# Patient Record
Sex: FEMALE | Race: WHITE | Employment: FULL TIME | URBAN - METROPOLITAN AREA
[De-identification: names, ages, dates, MRNs, and addresses within clinical notes are randomized per-mention and may not be internally consistent; named-entity substitution may affect disease eponyms.]

---

## 2017-04-19 ENCOUNTER — TELEPHONE (OUTPATIENT)
Dept: INTERNAL MEDICINE CLINIC | Age: 52
End: 2017-04-19

## 2017-04-19 DIAGNOSIS — Z12.11 COLON CANCER SCREENING: ICD-10-CM

## 2017-04-19 LAB
CONTROL: NORMAL
HEMOCCULT STL QL: NEGATIVE

## 2017-04-19 PROCEDURE — 82274 ASSAY TEST FOR BLOOD FECAL: CPT | Performed by: FAMILY MEDICINE

## 2017-11-16 ENCOUNTER — HOSPITAL ENCOUNTER (OUTPATIENT)
Dept: MAMMOGRAPHY | Age: 52
Discharge: HOME | End: 2017-11-16
Attending: FAMILY MEDICINE | Admitting: FAMILY MEDICINE

## 2017-11-16 DIAGNOSIS — Z12.31 ENCOUNTER FOR SCREENING MAMMOGRAM FOR BREAST CANCER: ICD-10-CM

## 2018-01-04 ENCOUNTER — OFFICE VISIT (OUTPATIENT)
Dept: INTERNAL MEDICINE CLINIC | Age: 53
End: 2018-01-04

## 2018-01-04 VITALS
BODY MASS INDEX: 37.21 KG/M2 | WEIGHT: 210 LBS | HEART RATE: 72 BPM | HEIGHT: 63 IN | DIASTOLIC BLOOD PRESSURE: 82 MMHG | SYSTOLIC BLOOD PRESSURE: 118 MMHG

## 2018-01-04 DIAGNOSIS — Z00.00 ROUTINE PHYSICAL EXAMINATION: Primary | ICD-10-CM

## 2018-01-04 DIAGNOSIS — Z79.890 HORMONE REPLACEMENT THERAPY: ICD-10-CM

## 2018-01-04 DIAGNOSIS — E66.9 OBESITY (BMI 35.0-39.9 WITHOUT COMORBIDITY): ICD-10-CM

## 2018-01-04 DIAGNOSIS — E03.9 ACQUIRED HYPOTHYROIDISM: ICD-10-CM

## 2018-01-04 LAB
A/G RATIO: 1.7 (ref 1.1–2.2)
ALBUMIN SERPL-MCNC: 4.7 G/DL (ref 3.4–5)
ALP BLD-CCNC: 54 U/L (ref 40–129)
ALT SERPL-CCNC: 41 U/L (ref 10–40)
ANION GAP SERPL CALCULATED.3IONS-SCNC: 17 MMOL/L (ref 3–16)
AST SERPL-CCNC: 35 U/L (ref 15–37)
BASOPHILS ABSOLUTE: 0.1 K/UL (ref 0–0.2)
BASOPHILS RELATIVE PERCENT: 0.8 %
BILIRUB SERPL-MCNC: 0.3 MG/DL (ref 0–1)
BUN BLDV-MCNC: 9 MG/DL (ref 7–20)
CALCIUM SERPL-MCNC: 10 MG/DL (ref 8.3–10.6)
CHLORIDE BLD-SCNC: 97 MMOL/L (ref 99–110)
CO2: 28 MMOL/L (ref 21–32)
CREAT SERPL-MCNC: 0.6 MG/DL (ref 0.6–1.1)
EOSINOPHILS ABSOLUTE: 0.3 K/UL (ref 0–0.6)
EOSINOPHILS RELATIVE PERCENT: 3.3 %
GFR AFRICAN AMERICAN: >60
GFR NON-AFRICAN AMERICAN: >60
GLOBULIN: 2.7 G/DL
GLUCOSE BLD-MCNC: 75 MG/DL (ref 70–99)
HCT VFR BLD CALC: 45.7 % (ref 36–48)
HEMOGLOBIN: 15.2 G/DL (ref 12–16)
LYMPHOCYTES ABSOLUTE: 2.8 K/UL (ref 1–5.1)
LYMPHOCYTES RELATIVE PERCENT: 28.6 %
MCH RBC QN AUTO: 32.9 PG (ref 26–34)
MCHC RBC AUTO-ENTMCNC: 33.1 G/DL (ref 31–36)
MCV RBC AUTO: 99.3 FL (ref 80–100)
MONOCYTES ABSOLUTE: 0.7 K/UL (ref 0–1.3)
MONOCYTES RELATIVE PERCENT: 6.9 %
NEUTROPHILS ABSOLUTE: 5.9 K/UL (ref 1.7–7.7)
NEUTROPHILS RELATIVE PERCENT: 60.4 %
PDW BLD-RTO: 13.3 % (ref 12.4–15.4)
PLATELET # BLD: 303 K/UL (ref 135–450)
PMV BLD AUTO: 9.8 FL (ref 5–10.5)
POTASSIUM SERPL-SCNC: 4.3 MMOL/L (ref 3.5–5.1)
RBC # BLD: 4.61 M/UL (ref 4–5.2)
SODIUM BLD-SCNC: 142 MMOL/L (ref 136–145)
TOTAL PROTEIN: 7.4 G/DL (ref 6.4–8.2)
TSH REFLEX: 0.49 UIU/ML (ref 0.27–4.2)
WBC # BLD: 9.8 K/UL (ref 4–11)

## 2018-01-04 PROCEDURE — 99396 PREV VISIT EST AGE 40-64: CPT | Performed by: FAMILY MEDICINE

## 2018-01-04 RX ORDER — ESTRADIOL 2 MG/1
TABLET ORAL
Qty: 90 TABLET | Refills: 3 | Status: SHIPPED | OUTPATIENT
Start: 2018-01-04 | End: 2019-04-17 | Stop reason: SDUPTHER

## 2018-01-04 RX ORDER — LEVOTHYROXINE SODIUM 0.12 MG/1
TABLET ORAL
Qty: 90 TABLET | Refills: 3 | Status: SHIPPED | OUTPATIENT
Start: 2018-01-04 | End: 2018-01-26 | Stop reason: SDUPTHER

## 2018-01-04 ASSESSMENT — PATIENT HEALTH QUESTIONNAIRE - PHQ9
SUM OF ALL RESPONSES TO PHQ QUESTIONS 1-9: 0
SUM OF ALL RESPONSES TO PHQ9 QUESTIONS 1 & 2: 0
1. LITTLE INTEREST OR PLEASURE IN DOING THINGS: 0
2. FEELING DOWN, DEPRESSED OR HOPELESS: 0

## 2018-01-04 NOTE — PROGRESS NOTES
PREVENTATIVE VISIT AND EXAM      Here for annual physical.  Overall feeling fine/stable. She also needs her yearly thyroid check and refill on her hormone replacement. Exercise:  She is walking 15 minutes 3 times per week, plus trying to go up and down stairs more often. She does not use a pedometer or record her activity otherwise.     Patient Active Problem List   Diagnosis    Allergic rhinitis    Asthma    Hypothyroidism    Hormone replacement therapy       Past Medical History:   Diagnosis Date    Allergic rhinitis     Alec Berrios MD: mold, ragweed, feathers    Asthma     has cats in her home    Hypothyroidism     Pelvic relaxation     Urine, incontinence, stress female 7/27/2012    Vasomotor rhinitis      Past Surgical History:   Procedure Laterality Date    CHOLECYSTECTOMY  1998    ENDOMETRIAL ABLATION  2004    HYSTERECTOMY, VAGINAL      INCONTINENCE SURGERY  03/2014    Dr. Samuel Kendall  2004     Family History   Problem Relation Age of Onset    Other Son      probable mild anxiety --- more from his paternal side    Heart Disease Mother     Hypertension Mother    Joanna Mccoy Other Mother      GERD/IBS    Osteoarthritis Mother     Stroke Mother      due to uncontrolled HTN    Memory Loss Mother      may have been HTN-cinthia encephalopathy    Hypertension Father      Social History   Substance Use Topics    Smoking status: Former Smoker     Packs/day: 1.00     Years: 10.00     Types: Cigarettes     Start date: 1/1/1988     Quit date: 1/1/1998    Smokeless tobacco: Never Used    Alcohol use 0.0 - 1.0 oz/week      Comment: Occassional to rare:  0-2 per month       Outpatient Prescriptions Marked as Taking for the 1/4/18 encounter (Office Visit) with Jeannie Alejandra MD   Medication Sig Dispense Refill    estradiol (ESTRACE) 2 MG tablet Take 1 tablet by mouth  daily 90 tablet 3    levothyroxine (SYNTHROID) 125 MCG tablet TAKE 1 TABLET DAILY 90 tablet 3    Triamcinolone Acetonide (NASACORT ALLERGY 24HR) 55 MCG/ACT AERO 1 spray by Nasal route daily. Use in each nostril 1 Bottle 11         ROS:  Full 12 system review done and all negative except for the following:   -Nasal and sinus congestion. She has allergies and vasomotor rhinitis. -Frequent heartburn. A couple episodes over the year, estimated to be 3-4 times where she has some upper chest heaviness that radiates to the right side. It occurs when she is sitting and resting. She is not emotionally upset nor exercising/exerting herself. It lasts only for about 2 minutes. Physical Exam   Vitals:    01/04/18 1302   BP: 118/82   Pulse: 72     Body mass index is 37.2 kg/m². Wt Readings from Last 3 Encounters:   01/04/18 210 lb (95.3 kg)   12/22/16 203 lb (92.1 kg)   12/17/15 214 lb 6.4 oz (97.3 kg)         Constitutional: Oriented to person, place, and time. Appears well-developed and well-nourished. No distress. HENT:   Head: Normocephalic and atraumatic. Ears: Hearing, tympanic membrane, external ear and ear canal normal.   Nose: Nose normal.   Mouth/Throat: Uvula is midline, oropharynx is clear and moist and mucous membranes are normal.   Eyes: Conjunctivae and EOM are normal. Pupils are equal, round, and reactive to light. No scleral icterus. Neck: Normal range of motion. Neck supple. Normal carotid pulses and no JVD present. Carotid bruit is not present. No tracheal deviation present. No mass and no thyromegaly present. Cardiovascular: Normal rate, regular rhythm, S1 normal, S2 normal, normal heart sounds and intact distal pulses. No murmur heard. Pulmonary/Chest: Effort normal and breath sounds normal. No stridor. No respiratory distress. No decreased breath sounds. No wheezes. No rhonchi. No rales. Abdominal: Soft. Normal appearance and bowel sounds are normal. No distension, no abdominal bruit and no mass. There is no hepatomegaly. No tenderness. Musculoskeletal: Normal range of motion. No edema.

## 2018-01-04 NOTE — PATIENT INSTRUCTIONS
of exercise over 3 or more day of the week. Preventative services for women:  *  Pap smear yearly starting at age 24 (or approximately 3 years from having first intercourse if younger than age 25) until determined to be low risk status and then at least every 3 years. If a hysterectomy is done for non-cancer reasons, a Pap smear is no longer required. If you have been regular with pap smears up until the age of 72, you may be able to stop routine screening  *  Osteoporosis testing for women 65 years and older. Younger women at increased risk. *  Mammogram every one to two years starting at the age of 36 versus 39. Women with a specific family history may need to start screening at a younger age. A doctor's order is NOT required unless it is not a routine yearly mammogram, so may call hospital or facility and schedule yearly. [id number]    Body for Life by [employer] and/or web site [email or web address]. Excellent program for the weight lifting/weight training and also cardio exercise. Make sure not to lift weights with the same muscle group for at least 72 hours, so the muscles can repair and grow, increasing your metabolism. Use the weight lifting grids and spacing. Week 1:  Upper body, skip a day, lower body, skip a day, upper body. Week 2:  Lower body, skip a day, upper body, skip a day, lower body    2 lb, 3 lb, 5 lb and 8 lb weights.   (no weight for the first rep is OK to star also)

## 2018-11-24 ENCOUNTER — HOSPITAL ENCOUNTER (OUTPATIENT)
Dept: WOMENS IMAGING | Age: 53
Discharge: HOME | End: 2018-11-24
Payer: COMMERCIAL

## 2018-11-24 DIAGNOSIS — Z12.39 BREAST CANCER SCREENING: ICD-10-CM

## 2018-11-24 PROCEDURE — 77067 SCR MAMMO BI INCL CAD: CPT

## 2019-01-10 ENCOUNTER — OFFICE VISIT (OUTPATIENT)
Dept: INTERNAL MEDICINE CLINIC | Age: 54
End: 2019-01-10
Payer: COMMERCIAL

## 2019-01-10 VITALS
WEIGHT: 216.6 LBS | DIASTOLIC BLOOD PRESSURE: 76 MMHG | BODY MASS INDEX: 38.38 KG/M2 | HEART RATE: 72 BPM | SYSTOLIC BLOOD PRESSURE: 118 MMHG | HEIGHT: 63 IN

## 2019-01-10 DIAGNOSIS — E03.9 ACQUIRED HYPOTHYROIDISM: ICD-10-CM

## 2019-01-10 DIAGNOSIS — E66.9 OBESITY (BMI 35.0-39.9 WITHOUT COMORBIDITY): ICD-10-CM

## 2019-01-10 DIAGNOSIS — Z00.00 ANNUAL PHYSICAL EXAM: Primary | ICD-10-CM

## 2019-01-10 DIAGNOSIS — Z12.11 COLON CANCER SCREENING: ICD-10-CM

## 2019-01-10 DIAGNOSIS — E78.9 BORDERLINE HIGH CHOLESTEROL: ICD-10-CM

## 2019-01-10 PROCEDURE — 99396 PREV VISIT EST AGE 40-64: CPT | Performed by: FAMILY MEDICINE

## 2019-01-10 RX ORDER — LEVOTHYROXINE SODIUM 0.12 MG/1
TABLET ORAL
Qty: 90 TABLET | Refills: 3 | Status: SHIPPED | OUTPATIENT
Start: 2019-01-10 | End: 2020-01-26

## 2019-01-10 ASSESSMENT — PATIENT HEALTH QUESTIONNAIRE - PHQ9
SUM OF ALL RESPONSES TO PHQ QUESTIONS 1-9: 0
1. LITTLE INTEREST OR PLEASURE IN DOING THINGS: 0
SUM OF ALL RESPONSES TO PHQ QUESTIONS 1-9: 0
SUM OF ALL RESPONSES TO PHQ9 QUESTIONS 1 & 2: 0
2. FEELING DOWN, DEPRESSED OR HOPELESS: 0

## 2019-01-12 ENCOUNTER — HOSPITAL ENCOUNTER (OUTPATIENT)
Age: 54
Discharge: HOME | End: 2019-01-12
Payer: COMMERCIAL

## 2019-01-12 DIAGNOSIS — E78.9 BORDERLINE HIGH CHOLESTEROL: ICD-10-CM

## 2019-01-12 DIAGNOSIS — Z00.00 ANNUAL PHYSICAL EXAM: ICD-10-CM

## 2019-01-12 DIAGNOSIS — E03.9 ACQUIRED HYPOTHYROIDISM: ICD-10-CM

## 2019-01-12 DIAGNOSIS — E66.9 OBESITY (BMI 35.0-39.9 WITHOUT COMORBIDITY): ICD-10-CM

## 2019-01-12 LAB
A/G RATIO: 1.4 (ref 1.1–2.2)
ALBUMIN SERPL-MCNC: 4.3 G/DL (ref 3.4–5)
ALP BLD-CCNC: 56 U/L (ref 40–129)
ALT SERPL-CCNC: 34 U/L (ref 10–40)
ANION GAP SERPL CALCULATED.3IONS-SCNC: 12 MMOL/L (ref 3–16)
AST SERPL-CCNC: 26 U/L (ref 15–37)
BASOPHILS ABSOLUTE: 0 K/UL (ref 0–0.2)
BASOPHILS RELATIVE PERCENT: 0.3 %
BILIRUB SERPL-MCNC: 0.4 MG/DL (ref 0–1)
BUN BLDV-MCNC: 8 MG/DL (ref 7–20)
CALCIUM SERPL-MCNC: 9.7 MG/DL (ref 8.3–10.6)
CHLORIDE BLD-SCNC: 104 MMOL/L (ref 99–110)
CHOLESTEROL, TOTAL: 165 MG/DL (ref 0–199)
CO2: 28 MMOL/L (ref 21–32)
CREAT SERPL-MCNC: 0.7 MG/DL (ref 0.6–1.1)
EOSINOPHILS ABSOLUTE: 0.4 K/UL (ref 0–0.6)
EOSINOPHILS RELATIVE PERCENT: 4.6 %
GFR AFRICAN AMERICAN: >60
GFR NON-AFRICAN AMERICAN: >60
GLOBULIN: 3.1 G/DL
GLUCOSE BLD-MCNC: 111 MG/DL (ref 70–99)
HCT VFR BLD CALC: 45 % (ref 36–48)
HDLC SERPL-MCNC: 45 MG/DL (ref 40–60)
HEMOGLOBIN: 14.9 G/DL (ref 12–16)
LDL CHOLESTEROL CALCULATED: 72 MG/DL
LYMPHOCYTES ABSOLUTE: 1.8 K/UL (ref 1–5.1)
LYMPHOCYTES RELATIVE PERCENT: 23.3 %
MCH RBC QN AUTO: 32.9 PG (ref 26–34)
MCHC RBC AUTO-ENTMCNC: 33.2 G/DL (ref 31–36)
MCV RBC AUTO: 99 FL (ref 80–100)
MONOCYTES ABSOLUTE: 0.7 K/UL (ref 0–1.3)
MONOCYTES RELATIVE PERCENT: 8.9 %
NEUTROPHILS ABSOLUTE: 5 K/UL (ref 1.7–7.7)
NEUTROPHILS RELATIVE PERCENT: 62.9 %
PDW BLD-RTO: 13.4 % (ref 12.4–15.4)
PLATELET # BLD: 308 K/UL (ref 135–450)
PMV BLD AUTO: 11.8 FL (ref 5–10.5)
POTASSIUM SERPL-SCNC: 4.7 MMOL/L (ref 3.5–5.1)
RBC # BLD: 4.55 M/UL (ref 4–5.2)
SODIUM BLD-SCNC: 144 MMOL/L (ref 136–145)
TOTAL PROTEIN: 7.4 G/DL (ref 6.4–8.2)
TRIGL SERPL-MCNC: 238 MG/DL (ref 0–150)
TSH REFLEX: 0.3 UIU/ML (ref 0.27–4.2)
VLDLC SERPL CALC-MCNC: 48 MG/DL
WBC # BLD: 7.9 K/UL (ref 4–11)

## 2019-01-12 PROCEDURE — 80053 COMPREHEN METABOLIC PANEL: CPT

## 2019-01-12 PROCEDURE — 85025 COMPLETE CBC W/AUTO DIFF WBC: CPT

## 2019-01-12 PROCEDURE — 84443 ASSAY THYROID STIM HORMONE: CPT

## 2019-01-12 PROCEDURE — 36415 COLL VENOUS BLD VENIPUNCTURE: CPT

## 2019-01-12 PROCEDURE — 80061 LIPID PANEL: CPT

## 2019-01-26 DIAGNOSIS — E03.9 ACQUIRED HYPOTHYROIDISM: ICD-10-CM

## 2019-01-28 RX ORDER — LEVOTHYROXINE SODIUM 0.12 MG/1
TABLET ORAL
Qty: 30 TABLET | Refills: 0 | Status: SHIPPED | OUTPATIENT
Start: 2019-01-28 | End: 2020-01-23 | Stop reason: SDUPTHER

## 2019-10-27 DIAGNOSIS — Z79.890 HORMONE REPLACEMENT THERAPY: ICD-10-CM

## 2019-10-28 RX ORDER — ESTRADIOL 2 MG/1
TABLET ORAL
Qty: 90 TABLET | Refills: 0 | Status: SHIPPED | OUTPATIENT
Start: 2019-10-28 | End: 2020-01-23 | Stop reason: SDUPTHER

## 2019-12-07 ENCOUNTER — HOSPITAL ENCOUNTER (OUTPATIENT)
Dept: WOMENS IMAGING | Age: 54
Discharge: HOME | End: 2019-12-07
Payer: COMMERCIAL

## 2019-12-07 DIAGNOSIS — Z12.31 BREAST CANCER SCREENING BY MAMMOGRAM: ICD-10-CM

## 2019-12-07 PROCEDURE — 77067 SCR MAMMO BI INCL CAD: CPT

## 2020-01-23 ENCOUNTER — OFFICE VISIT (OUTPATIENT)
Dept: INTERNAL MEDICINE CLINIC | Age: 55
End: 2020-01-23
Payer: COMMERCIAL

## 2020-01-23 VITALS
HEIGHT: 63 IN | DIASTOLIC BLOOD PRESSURE: 78 MMHG | SYSTOLIC BLOOD PRESSURE: 118 MMHG | WEIGHT: 215 LBS | BODY MASS INDEX: 38.09 KG/M2 | HEART RATE: 80 BPM

## 2020-01-23 LAB
A/G RATIO: 1.5 (ref 1.1–2.2)
ALBUMIN SERPL-MCNC: 4.3 G/DL (ref 3.4–5)
ALP BLD-CCNC: 55 U/L (ref 40–129)
ALT SERPL-CCNC: 35 U/L (ref 10–40)
ANION GAP SERPL CALCULATED.3IONS-SCNC: 14 MMOL/L (ref 3–16)
AST SERPL-CCNC: 33 U/L (ref 15–37)
BASOPHILS ABSOLUTE: 0.1 K/UL (ref 0–0.2)
BASOPHILS RELATIVE PERCENT: 1 %
BILIRUB SERPL-MCNC: 0.3 MG/DL (ref 0–1)
BUN BLDV-MCNC: 6 MG/DL (ref 7–20)
CALCIUM SERPL-MCNC: 9.8 MG/DL (ref 8.3–10.6)
CHLORIDE BLD-SCNC: 101 MMOL/L (ref 99–110)
CHOLESTEROL, TOTAL: 170 MG/DL (ref 0–199)
CO2: 28 MMOL/L (ref 21–32)
CREAT SERPL-MCNC: 0.6 MG/DL (ref 0.6–1.1)
EOSINOPHILS ABSOLUTE: 0.2 K/UL (ref 0–0.6)
EOSINOPHILS RELATIVE PERCENT: 2.3 %
GFR AFRICAN AMERICAN: >60
GFR NON-AFRICAN AMERICAN: >60
GLOBULIN: 2.8 G/DL
GLUCOSE BLD-MCNC: 106 MG/DL (ref 70–99)
HCT VFR BLD CALC: 44.6 % (ref 36–48)
HDLC SERPL-MCNC: 49 MG/DL (ref 40–60)
HEMOGLOBIN: 15.1 G/DL (ref 12–16)
LDL CHOLESTEROL CALCULATED: 77 MG/DL
LYMPHOCYTES ABSOLUTE: 2 K/UL (ref 1–5.1)
LYMPHOCYTES RELATIVE PERCENT: 24.4 %
MCH RBC QN AUTO: 33.1 PG (ref 26–34)
MCHC RBC AUTO-ENTMCNC: 33.8 G/DL (ref 31–36)
MCV RBC AUTO: 97.8 FL (ref 80–100)
MONOCYTES ABSOLUTE: 0.6 K/UL (ref 0–1.3)
MONOCYTES RELATIVE PERCENT: 6.8 %
NEUTROPHILS ABSOLUTE: 5.4 K/UL (ref 1.7–7.7)
NEUTROPHILS RELATIVE PERCENT: 65.5 %
PDW BLD-RTO: 13.6 % (ref 12.4–15.4)
PLATELET # BLD: 263 K/UL (ref 135–450)
PMV BLD AUTO: 11 FL (ref 5–10.5)
POTASSIUM SERPL-SCNC: 4.3 MMOL/L (ref 3.5–5.1)
RBC # BLD: 4.56 M/UL (ref 4–5.2)
SODIUM BLD-SCNC: 143 MMOL/L (ref 136–145)
T3 TOTAL: 1.59 NG/ML (ref 0.8–2)
T4 FREE: 1.5 NG/DL (ref 0.9–1.8)
TOTAL PROTEIN: 7.1 G/DL (ref 6.4–8.2)
TRIGL SERPL-MCNC: 219 MG/DL (ref 0–150)
TSH REFLEX: 0.02 UIU/ML (ref 0.27–4.2)
VLDLC SERPL CALC-MCNC: 44 MG/DL
WBC # BLD: 8.2 K/UL (ref 4–11)

## 2020-01-23 PROCEDURE — 90732 PPSV23 VACC 2 YRS+ SUBQ/IM: CPT | Performed by: FAMILY MEDICINE

## 2020-01-23 PROCEDURE — 99396 PREV VISIT EST AGE 40-64: CPT | Performed by: FAMILY MEDICINE

## 2020-01-23 PROCEDURE — 90471 IMMUNIZATION ADMIN: CPT | Performed by: FAMILY MEDICINE

## 2020-01-23 RX ORDER — FAMOTIDINE 20 MG/1
20 TABLET, FILM COATED ORAL 2 TIMES DAILY PRN
COMMUNITY

## 2020-01-23 RX ORDER — ESTRADIOL 1 MG/1
TABLET ORAL
Qty: 135 TABLET | Refills: 3 | Status: SHIPPED | OUTPATIENT
Start: 2020-01-23 | End: 2021-02-02 | Stop reason: SDUPTHER

## 2020-01-23 SDOH — ECONOMIC STABILITY: TRANSPORTATION INSECURITY
IN THE PAST 12 MONTHS, HAS LACK OF TRANSPORTATION KEPT YOU FROM MEETINGS, WORK, OR FROM GETTING THINGS NEEDED FOR DAILY LIVING?: NO

## 2020-01-23 SDOH — ECONOMIC STABILITY: FOOD INSECURITY: WITHIN THE PAST 12 MONTHS, YOU WORRIED THAT YOUR FOOD WOULD RUN OUT BEFORE YOU GOT MONEY TO BUY MORE.: NEVER TRUE

## 2020-01-23 SDOH — ECONOMIC STABILITY: FOOD INSECURITY: WITHIN THE PAST 12 MONTHS, THE FOOD YOU BOUGHT JUST DIDN'T LAST AND YOU DIDN'T HAVE MONEY TO GET MORE.: NEVER TRUE

## 2020-01-23 SDOH — HEALTH STABILITY: MENTAL HEALTH: HOW MANY STANDARD DRINKS CONTAINING ALCOHOL DO YOU HAVE ON A TYPICAL DAY?: 1 OR 2

## 2020-01-23 SDOH — ECONOMIC STABILITY: TRANSPORTATION INSECURITY
IN THE PAST 12 MONTHS, HAS THE LACK OF TRANSPORTATION KEPT YOU FROM MEDICAL APPOINTMENTS OR FROM GETTING MEDICATIONS?: NO

## 2020-01-23 SDOH — ECONOMIC STABILITY: INCOME INSECURITY: HOW HARD IS IT FOR YOU TO PAY FOR THE VERY BASICS LIKE FOOD, HOUSING, MEDICAL CARE, AND HEATING?: NOT HARD AT ALL

## 2020-01-23 SDOH — HEALTH STABILITY: PHYSICAL HEALTH: ON AVERAGE, HOW MANY MINUTES DO YOU ENGAGE IN EXERCISE AT THIS LEVEL?: 30 MIN

## 2020-01-23 SDOH — HEALTH STABILITY: PHYSICAL HEALTH: ON AVERAGE, HOW MANY DAYS PER WEEK DO YOU ENGAGE IN MODERATE TO STRENUOUS EXERCISE (LIKE A BRISK WALK)?: 3 DAYS

## 2020-01-23 SDOH — HEALTH STABILITY: MENTAL HEALTH: HOW OFTEN DO YOU HAVE A DRINK CONTAINING ALCOHOL?: 2-4 TIMES A MONTH

## 2020-01-23 NOTE — PROGRESS NOTES
Chief Complaint   Patient presents with    Annual Exam     Fasting today. PREVENTATIVE VISIT AND EXAM      Exercise:  Walking rapidly on the treadmill. She tries to do at least a mile each time in 30 min. Sometimes goes back for another 15 min later in day. Having GERD symptoms every other day. Was taking Zantac but now taking Pepcid AC and it seems to be OK.     Patient Active Problem List   Diagnosis    Allergic rhinitis    Asthma    Hypothyroidism    Hormone replacement therapy    Obesity (BMI 35.0-39.9 without comorbidity)       Past Medical History:   Diagnosis Date    Allergic rhinitis     Stephen Vega MD: mold, ragweed, feathers    Asthma     has cats in her home    Hypothyroidism     Pelvic relaxation     Urine, incontinence, stress female 2012    Vasomotor rhinitis      Past Surgical History:   Procedure Laterality Date    CHOLECYSTECTOMY      ENDOMETRIAL ABLATION      HYSTERECTOMY, VAGINAL      INCONTINENCE SURGERY  2014    Dr. Arianna Singh       Family History   Problem Relation Age of Onset    Other Son         probable mild anxiety --- more from his paternal side    Heart Disease Mother     Hypertension Mother    [facility] Other Mother         GERD/IBS    Osteoarthritis Mother     Stroke Mother         due to uncontrolled HTN    Memory Loss Mother         may have been HTN-cinthia encephalopathy    Hypertension Father      Social History     Tobacco Use    Smoking status: Former Smoker     Packs/day: 1.00     Years: 10.00     Pack years: 10.00     Types: Cigarettes     Start date: 1988     Last attempt to quit: 1998     Years since quittin.0    Smokeless tobacco: Never Used   Substance Use Topics    Alcohol use: No     Alcohol/week: 0.0 - 1.7 standard drinks     Comment: Occassional to rare:  0-2 per month    Drug use: No       Outpatient Medications Marked as Taking for the 20 encounter (Office Visit) with Juan Vázquez MD   Medication Sig Dispense Refill    estradiol (ESTRACE) 2 MG tablet TAKE 1 TABLET BY MOUTH DAILY 90 tablet 0    levothyroxine (SYNTHROID) 125 MCG tablet TAKE 1 TABLET BY MOUTH DAILY 90 tablet 3         ROS:  Full 12 system review done and all negative except for the following:   Nasal congestion,  Some SOB --- probably asthma, frequent heartburn    Last time she went to allergist her lung function test did decline some. This was before she started doing the treadmill. Had to take a couple months off treadmill due to foot pain. It is feeling better now. Physical Exam   Vitals:    01/23/20 0940   BP: 118/78   Pulse: 80      Body mass index is 38.09 kg/m². Wt Readings from Last 3 Encounters:   01/23/20 215 lb (97.5 kg)   01/10/19 216 lb 9.6 oz (98.2 kg)   01/04/18 210 lb (95.3 kg)         Constitutional: Oriented to person, place, and time. Appears well-developed and well-nourished. No distress. HENT:   Head: Normocephalic and atraumatic. Ears: Hearing, tympanic membrane, external ear and ear canal normal.   Nose: Nose normal.   Mouth/Throat: Uvula is midline, oropharynx is clear and moist and mucous membranes are normal.   Eyes: Conjunctivae and EOM are normal. Pupils are equal, round, and reactive to light. No scleral icterus. Neck: Normal range of motion. Neck supple. Normal carotid pulses and no JVD present. Carotid bruit is not present. No tracheal deviation present. No mass and no thyromegaly present. Cardiovascular: Normal rate, regular rhythm, S1 normal, S2 normal, normal heart sounds and intact distal pulses. No murmur heard. Pulmonary/Chest: Effort normal and breath sounds normal. No stridor. No respiratory distress. No decreased breath sounds. No wheezes. No rhonchi. No rales. Abdominal: Soft. Normal appearance and bowel sounds are normal. No distension, no abdominal bruit and no mass. There is no hepatomegaly. No tenderness. Musculoskeletal: Normal range of motion. No edema. Lymphadenopathy:     No cervical adenopathy. No axillary adenopathy. No supraclavicular adenopathy. No Inguinal adenopathy. Neurological: Alert and oriented to person, place, and time. Normal reflexes. No tremor. No cranial nerve deficit. She exhibits normal muscle tone. Coordination and gait normal.   Skin: Skin is warm and dry. No rash noted. Not diaphoretic. No cyanosis. No pallor. Nails show no clubbing. Psychiatric:  Normal mood and affect. Speech is normal and behavior is normal. Cognition and memory are normal.       The 10-year ASCVD risk score (Jade Rodgers et al., 2013) is: 1.4%    Values used to calculate the score:      Age: 49 years      Sex: Female      Is Non- : No      Diabetic: No      Tobacco smoker: No      Systolic Blood Pressure: 667 mmHg      Is BP treated: No      HDL Cholesterol: 45 mg/dL      Total Cholesterol: 165 mg/dL      Assessment:      1. Annual physical exam  Update HM issues. - CBC Auto Differential  - Comprehensive Metabolic Panel  - Lipid Panel  - TSH with Reflex  - Hemoglobin A1C    2. Hormone replacement therapy  Start weaning dose or ERT due to age. - estradiol (ESTRACE) 1 MG tablet; Take 2 pills on even days of the month and 1 pill on odd days of the month. Dispense: 135 tablet; Refill: 3    3. Acquired hypothyroidism  Euthyroid on current LT4 replacement.    - TSH with Reflex    4. Obesity (BMI 35.0-39.9 without comorbidity)  dwp diet and weight loss. See patient instructions. 5. Mild intermittent asthma without complication  See allergist.  Sounds clear today. Obesity can also cause decline in peak flow. 6. Hyperglycemia  - Comprehensive Metabolic Panel  - Hemoglobin A1C    7. Need for pneumococcal vaccination  Given PPV23 today.      8. Heartburn  On prn H2 blocker and doing OK  - CBC Auto Differential  - Comprehensive Metabolic Panel        Health Maintenance Due   Topic Date Due    Pneumococcal 0-64 years Vaccine (1 of 1 - PPSV23) 11/14/1972    Diabetes screen  11/14/2006    Shingles Vaccine (1 of 2) 11/14/2016    Flu vaccine (1) 09/01/2019    TSH testing  01/12/2020         Plan:    See orders and patient instructions. Age-appropriate preventative issues discussed and hand out given. An electronic signature was used to authenticate this note.     --Martin Diggs MD on 9:57 AM , 1/23/2020

## 2020-01-23 NOTE — PATIENT INSTRUCTIONS
The new estrogen pills are half the strength, so wean down gradually. If you have too many symptoms with taking one tab alternating with 2 tabs every day, then go slower. I may need to get you more pills. When you feel comfortable, you can cut down to 1 mg daily. SHINGLES VACCINE = SHINGRIX  1 out of 3 people will get shingles in their lifetime. If you had shingles already once in your life you have a 5% chance of getting it a second time = 5/100 chance of getting it again    The new Shingles vaccine = Shingrix is 95% effective at preventing shingles. It is a killed virus vaccine so you cannot get shingles from the vaccine or spread it to others. The old vaccine Zostavax was 50% effective and a weak live virus with more risk. The vaccine may not be covered by your insurance, so check with them or have the pharmacy run your insurance if they have it in stock and can give it. Patient Education        Learning About the Mediterranean Diet  What is the [address]? The Mediterranean diet is a style of eating rather than a diet plan. It features foods eaten in [place], [place], [place] and [place], and other countries along the [facility]. It emphasizes eating foods like fish, fruits, vegetables, beans, high-fiber breads and whole grains, nuts, and olive oil. This style of eating includes limited red meat, cheese, and sweets. Why choose the Mediterranean diet? A Mediterranean-style diet may improve heart health. It contains more fat than other heart-healthy diets. But the fats are mainly from nuts, unsaturated oils (such as fish oils and olive oil), and certain nut or seed oils (such as canola, soybean, or flaxseed oil). These fats may help protect the heart and blood vessels. How can you get started on the Mediterranean diet? Here are some things you can do to switch to a more Mediterranean way of eating.   What to eat  · Eat a variety of fruits and vegetables each day, such olive oil instead of butter. · Order dishes made with marinara sauce or sauces made from olive oil. Avoid sauces made from cream or mayonnaise. · Choose whole-grain breads, whole wheat pasta and pizza crust, brown rice, beans, and lentils. · Cut back on butter or margarine on bread. Instead, you can dip your bread in a small amount of olive oil. · Ask for a side salad or grilled vegetables instead of french fries or chips. Where can you learn more? Go to https://[employer] Social[employer]iceweb.[email or web address]. org and sign in to your [employer] account. Enter [phone] in the [facility] box to learn more about \"Learning About the Mediterranean Diet. \"     If you do not have an account, please click on the \"Sign Up Now\" link. Current as of: August 21, 2019  Content Version: 12.3  © 3912-6280 Healthwise, [employer]. Care instructions adapted under license by [facility] ([facility]). If you have questions about a medical condition or this instruction, always ask your healthcare professional. [place] [address] any warranty or liability for your use of this information.

## 2020-01-24 LAB
ESTIMATED AVERAGE GLUCOSE: 114 MG/DL
HBA1C MFR BLD: 5.6 %

## 2020-01-26 RX ORDER — LEVOTHYROXINE SODIUM 0.12 MG/1
TABLET ORAL
Qty: 90 TABLET | Refills: 3
Start: 2020-01-26 | End: 2020-02-17

## 2020-02-17 RX ORDER — LEVOTHYROXINE SODIUM 0.12 MG/1
TABLET ORAL
Qty: 90 TABLET | Refills: 3 | Status: SHIPPED | OUTPATIENT
Start: 2020-02-17 | End: 2020-09-13 | Stop reason: DRUGHIGH

## 2020-09-08 ENCOUNTER — NURSE TRIAGE (OUTPATIENT)
Dept: OTHER | Facility: CLINIC | Age: 55
End: 2020-09-08

## 2020-09-08 NOTE — TELEPHONE ENCOUNTER
Reason for Disposition   Numbness or tingling in one or both feet is a chronic symptom (recurrent or ongoing problem lasting > 4 weeks)    Answer Assessment - Initial Assessment Questions  1. SYMPTOM: \"What is the main symptom you are concerned about? \" (e.g., weakness, numbness)      Tingling   2. ONSET: \"When did this start? \" (minutes, hours, days; while sleeping)      8 days ago  3. LAST NORMAL: \"When was the last time you were normal (no symptoms)? \"      9 days ago  4. PATTERN \"Does this come and go, or has it been constant since it started? \"  \"Is it present now? \"      Constant   5. CARDIAC SYMPTOMS: \"Have you had any of the following symptoms: chest pain, difficulty breathing, palpitations? \"      none  6. NEUROLOGIC SYMPTOMS: \"Have you had any of the following symptoms: headache, dizziness, vision loss, double vision, changes in speech, unsteady on your feet? \"      3 weeks ago, dizziness/vertigo   7. OTHER SYMPTOMS: \"Do you have any other symptoms? \"      none  8. PREGNANCY: \"Is there any chance you are pregnant? \" \"When was your last menstrual period? \"      no    Protocols used: NEUROLOGIC DEFICIT-ADULT-[place]    Patient called pre-service center [facility] with red flag complaint. Brief description of triage: pt complaint of new right foot tingling for the last 2 weeks. Reports dizzy spell 3 weeks ago. Would like to be been & assessed by PCP    Triage indicates for patient to to be seen in 3 days    Care advice provided, patient verbalizes understanding; denies any other questions or concerns; instructed to call back for any new or worsening symptoms. Writer provided warm transfer to [facility] for appointment scheduling. Please do not respond to the triage nurse through this encounter. Any subsequent communication should be directly with the patient.

## 2020-09-10 ENCOUNTER — OFFICE VISIT (OUTPATIENT)
Dept: INTERNAL MEDICINE CLINIC | Age: 55
End: 2020-09-10
Payer: COMMERCIAL

## 2020-09-10 VITALS
HEIGHT: 63 IN | TEMPERATURE: 99 F | BODY MASS INDEX: 37.74 KG/M2 | HEART RATE: 100 BPM | DIASTOLIC BLOOD PRESSURE: 84 MMHG | SYSTOLIC BLOOD PRESSURE: 126 MMHG | WEIGHT: 213 LBS

## 2020-09-10 DIAGNOSIS — R20.0 NUMBNESS AND TINGLING OF BOTH FEET: ICD-10-CM

## 2020-09-10 DIAGNOSIS — E03.9 ACQUIRED HYPOTHYROIDISM: ICD-10-CM

## 2020-09-10 DIAGNOSIS — R20.2 NUMBNESS AND TINGLING OF BOTH FEET: ICD-10-CM

## 2020-09-10 DIAGNOSIS — R73.9 HYPERGLYCEMIA: ICD-10-CM

## 2020-09-10 LAB
BASOPHILS ABSOLUTE: 0.1 K/UL (ref 0–0.2)
BASOPHILS RELATIVE PERCENT: 1.1 %
EOSINOPHILS ABSOLUTE: 0.2 K/UL (ref 0–0.6)
EOSINOPHILS RELATIVE PERCENT: 2.2 %
HCT VFR BLD CALC: 43.6 % (ref 36–48)
HEMOGLOBIN: 14.7 G/DL (ref 12–16)
LYMPHOCYTES ABSOLUTE: 2.8 K/UL (ref 1–5.1)
LYMPHOCYTES RELATIVE PERCENT: 29.5 %
MCH RBC QN AUTO: 33 PG (ref 26–34)
MCHC RBC AUTO-ENTMCNC: 33.7 G/DL (ref 31–36)
MCV RBC AUTO: 98 FL (ref 80–100)
MONOCYTES ABSOLUTE: 0.7 K/UL (ref 0–1.3)
MONOCYTES RELATIVE PERCENT: 7.5 %
NEUTROPHILS ABSOLUTE: 5.6 K/UL (ref 1.7–7.7)
NEUTROPHILS RELATIVE PERCENT: 59.7 %
PDW BLD-RTO: 13.3 % (ref 12.4–15.4)
PLATELET # BLD: 260 K/UL (ref 135–450)
PMV BLD AUTO: 10.9 FL (ref 5–10.5)
RBC # BLD: 4.45 M/UL (ref 4–5.2)
WBC # BLD: 9.4 K/UL (ref 4–11)

## 2020-09-10 PROCEDURE — 99213 OFFICE O/P EST LOW 20 MIN: CPT | Performed by: FAMILY MEDICINE

## 2020-09-10 RX ORDER — FLUTICASONE PROPIONATE 110 UG/1
1 AEROSOL, METERED RESPIRATORY (INHALATION) 2 TIMES DAILY
COMMUNITY

## 2020-09-10 ASSESSMENT — ENCOUNTER SYMPTOMS
VOMITING: 1
NAUSEA: 1

## 2020-09-10 NOTE — PROGRESS NOTES
Subjective:      Patient ID: Kenton Ramsey is a 49 y.o. female. HPI   Chief Complaint   Patient presents with    Numbness     right food numb and tingling. Started 2 weeks ago. Visit to evaluate numbness and tingling. 2 weeks ago on a weekend is when it first started. Legs felt really numb, candie right leg. This eased up but then lower legs feel tingly. Around the ankle and top of the foot and up the lower leg. Mostly right lower leg an foot. Left may be a little tingly but not clearly numb    Vertigo and vomiting for 1 day and then the next day she was fine. This happened 3-4 weeks ago. Does not know if it could be connected or not. Occasionally has back pain. Did have some this past weekend because moving stone blocks, but numbness and tingling preceded this activity. Review of Systems   Constitutional: Negative for fever and unexpected weight change. Always thirsty   Gastrointestinal: Positive for nausea and vomiting (episode 3 weeks ago. ). Genitourinary: Positive for frequency (chronic). Neurological: Positive for dizziness (episode 3 weeks ago). Negative for headaches. Objective:   Physical Exam  Vitals signs reviewed. Constitutional:       Appearance: She is well-developed. Cardiovascular:      Rate and Rhythm: Normal rate and regular rhythm. Pulses:           Carotid pulses are 2+ on the right side and 2+ on the left side. Dorsalis pedis pulses are 1+ on the right side and 1+ on the left side. Posterior tibial pulses are 2+ on the right side and 2+ on the left side. Heart sounds: Normal heart sounds. Heart sounds not distant. No murmur. Pulmonary:      Effort: Pulmonary effort is normal.      Breath sounds: Normal breath sounds. Skin:     General: Skin is warm and dry. Coloration: Skin is not pale. Findings: No erythema or rash. Neurological:      Sensory: Sensation is intact. No sensory deficit.       Motor: Motor function is intact. No tremor or abnormal muscle tone. Coordination: Coordination is intact. Gait: Gait is intact. Deep Tendon Reflexes: Babinski sign absent on the right side. Babinski sign absent on the left side. Reflex Scores:       Patellar reflexes are 2+ on the right side and 2+ on the left side. Achilles reflexes are 2+ on the right side and 2+ on the left side. Comments: Sensation in feet and lower legs tested with monofiliament, tuning fork and soft cotton. Able to feel everything. Neg Lehmitte's    Psychiatric:         Behavior: Behavior normal.          Wt Readings from Last 3 Encounters:   09/10/20 213 lb (96.6 kg)   01/23/20 215 lb (97.5 kg)   01/10/19 216 lb 9.6 oz (98.2 kg)     Temp Readings from Last 3 Encounters:   09/10/20 99 °F (37.2 °C) (Temporal)   01/21/15 98.1 °F (36.7 °C) (Oral)   10/25/11 99.3 °F (37.4 °C)     BP Readings from Last 3 Encounters:   09/10/20 126/84   01/23/20 118/78   01/10/19 118/76     Pulse Readings from Last 3 Encounters:   09/10/20 100   01/23/20 80   01/10/19 72         Assessment:      1. Numbness and tingling of both feet  Unclear cause. If nothing on labs explains this and it continues, then will need EMG/neurology evaluation.   - Vitamin B12; Future  - Hemoglobin A1C; Future  - TSH with Reflex; Future  - CBC Auto Differential; Future  - Comprehensive Metabolic Panel; Future    2. Hyperglycemia  Weight loss and diet. - Hemoglobin A1C; Future    3. Acquired hypothyroidism  - TSH with Reflex; Future          Plan:      See orders. See after visit summary, patient instructions, and reference hand-outs. A PRINTED SUMMARY = AVS GIVEN TO THE PATIENT, (or if Virtual Visit, patient told it is available in My Chart or will be mailed if not active on My Chart.)    Discussed use, benefit, and side effects of prescribed medications. Barriers to medication compliance addressed. All patient questions answered.           Tiago Simms, MD

## 2020-09-11 LAB
A/G RATIO: 1.7 (ref 1.1–2.2)
ALBUMIN SERPL-MCNC: 4.3 G/DL (ref 3.4–5)
ALP BLD-CCNC: 57 U/L (ref 40–129)
ALT SERPL-CCNC: 34 U/L (ref 10–40)
ANION GAP SERPL CALCULATED.3IONS-SCNC: 10 MMOL/L (ref 3–16)
AST SERPL-CCNC: 26 U/L (ref 15–37)
BILIRUB SERPL-MCNC: 0.4 MG/DL (ref 0–1)
BUN BLDV-MCNC: 9 MG/DL (ref 7–20)
CALCIUM SERPL-MCNC: 9.5 MG/DL (ref 8.3–10.6)
CHLORIDE BLD-SCNC: 102 MMOL/L (ref 99–110)
CO2: 28 MMOL/L (ref 21–32)
CREAT SERPL-MCNC: 0.7 MG/DL (ref 0.6–1.1)
ESTIMATED AVERAGE GLUCOSE: 114 MG/DL
GFR AFRICAN AMERICAN: >60
GFR NON-AFRICAN AMERICAN: >60
GLOBULIN: 2.5 G/DL
GLUCOSE BLD-MCNC: 94 MG/DL (ref 70–99)
HBA1C MFR BLD: 5.6 %
POTASSIUM SERPL-SCNC: 4.3 MMOL/L (ref 3.5–5.1)
SODIUM BLD-SCNC: 140 MMOL/L (ref 136–145)
T4 FREE: 1.9 NG/DL (ref 0.9–1.8)
TOTAL PROTEIN: 6.8 G/DL (ref 6.4–8.2)
TSH REFLEX: 0.01 UIU/ML (ref 0.27–4.2)
VITAMIN B-12: 683 PG/ML (ref 211–911)

## 2020-09-13 RX ORDER — LEVOTHYROXINE SODIUM 0.1 MG/1
TABLET ORAL
Qty: 90 TABLET | Refills: 0 | Status: SHIPPED | OUTPATIENT
Start: 2020-09-13 | End: 2020-12-09 | Stop reason: SDUPTHER

## 2020-10-19 ENCOUNTER — TELEPHONE (OUTPATIENT)
Dept: INTERNAL MEDICINE CLINIC | Age: 55
End: 2020-10-19

## 2020-10-19 NOTE — TELEPHONE ENCOUNTER
----- Message from Clotildekeely Joel sent at 10/19/2020  3:56 PM EDT -----  Subject: Message to Provider    QUESTIONS  Information for Provider? Need thyroid test order   for recheck. Need to fast? Prefer Saturdays   wondering if need appt or walk in.  ---------------------------------------------------------------------------  --------------  CALL BACK INFO  What is the best way for the office to contact you? OK to leave message on   voicemail  Preferred Call Back Phone Number? [phone]  ---------------------------------------------------------------------------  --------------  SCRIPT ANSWERS  Relationship to Patient?  Self

## 2020-10-22 DIAGNOSIS — E03.9 ACQUIRED HYPOTHYROIDISM: ICD-10-CM

## 2020-10-23 LAB
T3 TOTAL: 1.07 NG/ML (ref 0.8–2)
T4 FREE: 1.4 NG/DL (ref 0.9–1.8)
TSH REFLEX: 0.08 UIU/ML (ref 0.27–4.2)

## 2020-11-28 ENCOUNTER — HOSPITAL ENCOUNTER (EMERGENCY)
Age: 55
Discharge: HOME | End: 2020-11-28
Attending: STUDENT IN AN ORGANIZED HEALTH CARE EDUCATION/TRAINING PROGRAM
Payer: COMMERCIAL

## 2020-11-28 ENCOUNTER — APPOINTMENT (OUTPATIENT)
Dept: CT IMAGING | Age: 55
End: 2020-11-28
Payer: COMMERCIAL

## 2020-11-28 VITALS
OXYGEN SATURATION: 99 % | HEIGHT: 62 IN | HEART RATE: 90 BPM | RESPIRATION RATE: 17 BRPM | TEMPERATURE: 98.5 F | DIASTOLIC BLOOD PRESSURE: 61 MMHG | WEIGHT: 210 LBS | SYSTOLIC BLOOD PRESSURE: 122 MMHG | BODY MASS INDEX: 38.64 KG/M2

## 2020-11-28 LAB
A/G RATIO: 1.5 (ref 1.1–2.2)
ALBUMIN SERPL-MCNC: 4.5 G/DL (ref 3.4–5)
ALP BLD-CCNC: 67 U/L (ref 40–129)
ALT SERPL-CCNC: 30 U/L (ref 10–40)
ANION GAP SERPL CALCULATED.3IONS-SCNC: 10 MMOL/L (ref 3–16)
AST SERPL-CCNC: 26 U/L (ref 15–37)
BACTERIA: ABNORMAL /HPF
BASOPHILS ABSOLUTE: 0.1 K/UL (ref 0–0.2)
BASOPHILS RELATIVE PERCENT: 0.7 %
BILIRUB SERPL-MCNC: 0.3 MG/DL (ref 0–1)
BILIRUBIN URINE: NEGATIVE
BLOOD, URINE: NEGATIVE
BUN BLDV-MCNC: 9 MG/DL (ref 7–20)
CALCIUM SERPL-MCNC: 9 MG/DL (ref 8.3–10.6)
CHLORIDE BLD-SCNC: 105 MMOL/L (ref 99–110)
CLARITY: ABNORMAL
CO2: 26 MMOL/L (ref 21–32)
COLOR: YELLOW
COMMENT UA: ABNORMAL
CREAT SERPL-MCNC: 0.7 MG/DL (ref 0.6–1.1)
EOSINOPHILS ABSOLUTE: 0.2 K/UL (ref 0–0.6)
EOSINOPHILS RELATIVE PERCENT: 2.2 %
EPITHELIAL CELLS, UA: 14 /HPF (ref 0–5)
GFR AFRICAN AMERICAN: >60
GFR NON-AFRICAN AMERICAN: >60
GLOBULIN: 3 G/DL
GLUCOSE BLD-MCNC: 123 MG/DL (ref 70–99)
GLUCOSE URINE: NEGATIVE MG/DL
HCG QUALITATIVE: NEGATIVE
HCT VFR BLD CALC: 45.5 % (ref 36–48)
HEMOGLOBIN: 15.5 G/DL (ref 12–16)
HYALINE CASTS: 4 /LPF (ref 0–8)
KETONES, URINE: 40 MG/DL
LEUKOCYTE ESTERASE, URINE: NEGATIVE
LIPASE: 27 U/L (ref 13–60)
LYMPHOCYTES ABSOLUTE: 1.5 K/UL (ref 1–5.1)
LYMPHOCYTES RELATIVE PERCENT: 13.5 %
MCH RBC QN AUTO: 33.2 PG (ref 26–34)
MCHC RBC AUTO-ENTMCNC: 34 G/DL (ref 31–36)
MCV RBC AUTO: 97.5 FL (ref 80–100)
MICROSCOPIC EXAMINATION: YES
MONOCYTES ABSOLUTE: 0.7 K/UL (ref 0–1.3)
MONOCYTES RELATIVE PERCENT: 6.1 %
NEUTROPHILS ABSOLUTE: 8.5 K/UL (ref 1.7–7.7)
NEUTROPHILS RELATIVE PERCENT: 77.5 %
NITRITE, URINE: NEGATIVE
PDW BLD-RTO: 13.6 % (ref 12.4–15.4)
PH UA: 6 (ref 5–8)
PLATELET # BLD: 298 K/UL (ref 135–450)
PMV BLD AUTO: 8.2 FL (ref 5–10.5)
POTASSIUM SERPL-SCNC: 3.8 MMOL/L (ref 3.5–5.1)
PROTEIN UA: NEGATIVE MG/DL
RBC # BLD: 4.67 M/UL (ref 4–5.2)
RBC UA: ABNORMAL /HPF (ref 0–4)
SODIUM BLD-SCNC: 141 MMOL/L (ref 136–145)
SPECIFIC GRAVITY UA: 1.02 (ref 1–1.03)
TOTAL PROTEIN: 7.5 G/DL (ref 6.4–8.2)
TROPONIN: <0.01 NG/ML
URINE REFLEX TO CULTURE: ABNORMAL
URINE TYPE: ABNORMAL
UROBILINOGEN, URINE: 0.2 E.U./DL
WBC # BLD: 10.9 K/UL (ref 4–11)
WBC UA: 3 /HPF (ref 0–5)

## 2020-11-28 PROCEDURE — 2580000003 HC RX 258: Performed by: PHYSICIAN ASSISTANT

## 2020-11-28 PROCEDURE — 6360000002 HC RX W HCPCS: Performed by: PHYSICIAN ASSISTANT

## 2020-11-28 PROCEDURE — 81001 URINALYSIS AUTO W/SCOPE: CPT

## 2020-11-28 PROCEDURE — 84484 ASSAY OF TROPONIN QUANT: CPT

## 2020-11-28 PROCEDURE — C9113 INJ PANTOPRAZOLE SODIUM, VIA: HCPCS | Performed by: PHYSICIAN ASSISTANT

## 2020-11-28 PROCEDURE — 85025 COMPLETE CBC W/AUTO DIFF WBC: CPT

## 2020-11-28 PROCEDURE — 84703 CHORIONIC GONADOTROPIN ASSAY: CPT

## 2020-11-28 PROCEDURE — 93005 ELECTROCARDIOGRAM TRACING: CPT | Performed by: EMERGENCY MEDICINE

## 2020-11-28 PROCEDURE — 74177 CT ABD & PELVIS W/CONTRAST: CPT

## 2020-11-28 PROCEDURE — 83690 ASSAY OF LIPASE: CPT

## 2020-11-28 PROCEDURE — 6360000004 HC RX CONTRAST MEDICATION: Performed by: PHYSICIAN ASSISTANT

## 2020-11-28 PROCEDURE — 80053 COMPREHEN METABOLIC PANEL: CPT

## 2020-11-28 PROCEDURE — 96374 THER/PROPH/DIAG INJ IV PUSH: CPT

## 2020-11-28 PROCEDURE — 99284 EMERGENCY DEPT VISIT MOD MDM: CPT

## 2020-11-28 RX ORDER — PANTOPRAZOLE SODIUM 40 MG/10ML
40 INJECTION, POWDER, LYOPHILIZED, FOR SOLUTION INTRAVENOUS ONCE
Status: COMPLETED | OUTPATIENT
Start: 2020-11-28 | End: 2020-11-28

## 2020-11-28 RX ORDER — 0.9 % SODIUM CHLORIDE 0.9 %
1000 INTRAVENOUS SOLUTION INTRAVENOUS ONCE
Status: COMPLETED | OUTPATIENT
Start: 2020-11-28 | End: 2020-11-28

## 2020-11-28 RX ORDER — DICYCLOMINE HYDROCHLORIDE 10 MG/1
10 CAPSULE ORAL EVERY 6 HOURS PRN
Qty: 20 CAPSULE | Refills: 0 | Status: SHIPPED | OUTPATIENT
Start: 2020-11-28 | End: 2021-04-08

## 2020-11-28 RX ORDER — ONDANSETRON 4 MG/1
4 TABLET, ORALLY DISINTEGRATING ORAL EVERY 8 HOURS PRN
Qty: 20 TABLET | Refills: 0 | Status: SHIPPED | OUTPATIENT
Start: 2020-11-28 | End: 2021-04-08 | Stop reason: ALTCHOICE

## 2020-11-28 RX ORDER — ESOMEPRAZOLE MAGNESIUM 40 MG/1
40 CAPSULE, DELAYED RELEASE ORAL
Qty: 30 CAPSULE | Refills: 0 | Status: SHIPPED | OUTPATIENT
Start: 2020-11-28 | End: 2020-12-09 | Stop reason: DRUGHIGH

## 2020-11-28 RX ADMIN — PANTOPRAZOLE SODIUM 40 MG: 40 INJECTION, POWDER, FOR SOLUTION INTRAVENOUS at 14:49

## 2020-11-28 RX ADMIN — IOPAMIDOL 75 ML: 755 INJECTION, SOLUTION INTRAVENOUS at 15:52

## 2020-11-28 RX ADMIN — SODIUM CHLORIDE 1000 ML: 9 INJECTION, SOLUTION INTRAVENOUS at 16:47

## 2020-11-28 ASSESSMENT — PAIN SCALES - GENERAL: PAINLEVEL_OUTOF10: 8

## 2020-11-28 ASSESSMENT — ENCOUNTER SYMPTOMS
NAUSEA: 0
CHEST TIGHTNESS: 0
DIARRHEA: 0
SHORTNESS OF BREATH: 0
VOMITING: 0
ABDOMINAL PAIN: 1

## 2020-11-28 NOTE — ED NOTES
Patient discharged at this time in no acute distress after verbalizing understanding of discharge instructions and need for follow up with PCP .   Pt left ambulatory to discharge area after reviewing and receiving copy of ov AVS.   Patient education  Learner- Patient and family  Motivation and readiness to learn- Medium to High  Barriers to learning- None  Learning preference/provided instructions- Both written and verbal discharge instructions     Carolina Beckford RN  11/28/20 [id number]

## 2020-11-28 NOTE — ED NOTES
Bed: 27  Expected date:   Expected time:   Means of arrival: Walk In  Comments:     Sabino Almendarez RN  11/28/20 [id number]

## 2020-11-28 NOTE — ED NOTES
Hourly rounding on pt., Pt breathing easy without distress, pt appears comfortable, denies any additional needs or concerns at this time.      Pt at JEAN MARIE Schwartz  11/28/20 [id number]

## 2020-11-28 NOTE — ED PROVIDER NOTES
[facility]        Pt Name: Sanket Brooks  MRN: [id number]  [place] 1965  Date of evaluation: 11/28/2020  Provider: Isable Mcgrath PA-C  PCP: Sally Washington MD    This patient was seen and evaluated by the attending physician Dr. Polly Almanzar. CHIEF COMPLAINT       Chief Complaint   Patient presents with    Abdominal Pain     PT presents with c/o abd pain, denies any issues with BM or urination       HISTORY OF PRESENT ILLNESS   (Location, Timing/Onset, Context/Setting, Quality, Duration, Modifying Factors, Severity, Associated Signs and Symptoms)  Note limiting factors. Sanket Brooks is a 48 y.o. female presents the emergency department with difficulties as a pertains to epigastric abdominal pain. Patient states yesterday shortly after dinner she started experiencing some symptoms of an achiness in her stomach. She describes this as being symptoms that she experienced after having had pizza for dinner. Patient states she has had a cholecystectomy performed in the past and did not think anything of it. She goes on to report that in the overnight hours she started having increasing levels of pain and discomfort. She states that has continued as intermittent discomfort in the epigastric region since that time. When asked she states she does not drink alcohol. When asked she states that she does intermittently use nonsteroidal anti-inflammatory medications in the form of Aleve for over-the-counter pain relief. She denies any change in her stool. She reports positive flatus normal bowel movement no melena or bleeding. She states that she is not currently experiencing any kind of substernal chest pain. She denies palpitations lightheadedness or shortness of breath. Patient denies fevers chills or cough.   Patient denies    Nursing Notes were all reviewed and agreed with or any disagreements were addressed in the HPI.    REVIEW OF SYSTEMS    (2-9 systems for level 4, 10 or more for level 5)     Review of Systems   Constitutional: Negative for activity change, chills and fever. Respiratory: Negative for chest tightness and shortness of breath. Cardiovascular: Negative for chest pain. Gastrointestinal: Positive for abdominal pain. Negative for diarrhea, nausea and vomiting. Genitourinary: Negative for dysuria and flank pain. Positives and Pertinent negatives as per HPI. Except as noted above in the ROS, all other systems were reviewed and negative. PAST MEDICAL HISTORY     Past Medical History:   Diagnosis Date    Allergic rhinitis     Toña Garcia MD: mold, ragweed, feathers    Asthma     has cats in her home    Hypothyroidism     Pelvic relaxation     Urine, incontinence, stress female 7/27/2012    Vasomotor rhinitis          SURGICAL HISTORY     Past Surgical History:   Procedure Laterality Date    CHOLECYSTECTOMY  1998    ENDOMETRIAL ABLATION  2004    HYSTERECTOMY, VAGINAL      INCONTINENCE SURGERY  03/2014    Dr. Michelle Obrien  2004         CURRENTMEDICATIONS       Previous Medications    ESTRADIOL (ESTRACE) 1 MG TABLET    Take 2 pills on even days of the month and 1 pill on odd days of the month. FAMOTIDINE (PEPCID) 20 MG TABLET    Take 20 mg by mouth 2 times daily as needed    FLUTICASONE (FLOVENT HFA) 110 MCG/ACT INHALER    Inhale 1 puff into the lungs 2 times daily    LEVOTHYROXINE (SYNTHROID) 100 MCG TABLET    TAKE 1 TABLET BY MOUTH DAILY    TRIAMCINOLONE ACETONIDE (NASACORT ALLERGY 24HR) 55 MCG/ACT AERO    1 spray by Nasal route daily.  Use in each nostril         ALLERGIES     Cyclobenzaprine    FAMILYHISTORY       Family History   Problem Relation Age of Onset    Other Son         probable mild anxiety --- more from his paternal side    Heart Disease Mother     Hypertension Mother     Other Mother         GERD/IBS    Osteoarthritis Mother     Stroke Mother due to uncontrolled HTN    Memory Loss Mother         may have been HTN-cinthia encephalopathy    Hypertension Father           SOCIAL HISTORY       Social History     Tobacco Use    Smoking status: Former Smoker     Packs/day: 1.00     Years: 10.00     Pack years: 10.00     Types: Cigarettes     Start date: 1988     Last attempt to quit: 1998     Years since quittin.9    Smokeless tobacco: Never Used   Substance Use Topics    Alcohol use: Yes     Alcohol/week: 0.0 - 1.7 standard drinks     Frequency: 2-4 times a month     Drinks per session: 1 or 2     Comment: maybe a little more in summer.  Drug use: No       SCREENINGS             PHYSICAL EXAM    (up to 7 for level 4, 8 or more for level 5)     ED Triage Vitals [20 1251]   BP Temp Temp Source Pulse Resp SpO2 Height Weight   (!) 145/91 98.5 °F (36.9 °C) Oral 90 17 98 % 5' 2\" (1.575 m) 210 lb (95.3 kg)       Physical Exam  Vitals signs and nursing note reviewed. Constitutional:       General: She is not in acute distress. Appearance: Normal appearance. She is well-developed. She is not diaphoretic. HENT:      Head: Normocephalic and atraumatic. Right Ear: External ear normal.      Left Ear: External ear normal.   Eyes:      General: No scleral icterus. Right eye: No discharge. Left eye: No discharge. Conjunctiva/sclera: Conjunctivae normal.   Neck:      Musculoskeletal: Normal range of motion. Vascular: No JVD. Cardiovascular:      Rate and Rhythm: Normal rate and regular rhythm. Heart sounds: No murmur. No friction rub. No gallop. Pulmonary:      Effort: Pulmonary effort is normal. No accessory muscle usage or respiratory distress. Breath sounds: Normal breath sounds. No wheezing, rhonchi or rales. Abdominal:      General: Abdomen is protuberant. There is no distension. Palpations: Abdomen is soft. Abdomen is not rigid. There is no mass. Tenderness:  There is abdominal tenderness in the epigastric area. There is no guarding or rebound. Skin:     General: Skin is warm and dry. Neurological:      Mental Status: She is alert and oriented to person, place, and time. GCS: GCS eye subscore is 4. GCS verbal subscore is 5. GCS motor subscore is 6. Cranial Nerves: No cranial nerve deficit. Sensory: No sensory deficit.       Coordination: Coordination normal.   Psychiatric:         Behavior: Behavior normal.         DIAGNOSTIC RESULTS   LABS:    Labs Reviewed   CBC WITH AUTO DIFFERENTIAL - Abnormal; Notable for the following components:       Result Value    Neutrophils Absolute 8.5 (*)     All other components within normal limits    Narrative:     Performed at:  [facility] [email or web address]   Phone [phone]   COMPREHENSIVE METABOLIC PANEL - Abnormal; Notable for the following components:    Glucose 123 (*)     All other components within normal limits    Narrative:     Performed at:  [facility] [address], [employer]   Phone [phone]   URINE RT REFLEX TO CULTURE - Abnormal; Notable for the following components:    Clarity, UA CLOUDY (*)     Ketones, Urine 40 (*)     All other components within normal limits    Narrative:     Performed at:  [facility] [email or web address]   Phone [phone]   MICROSCOPIC URINALYSIS - Abnormal; Notable for the following components:    Bacteria, UA 1+ (*)     Epithelial Cells, UA 14 (*)     All other components within normal limits    Narrative:     Performed at:  [facility] [address], [employer]   Phone [phone]   HCG, SERUM, QUALITATIVE    Narrative:     Performed at:  [facility] [address], [employer]   Phone [phone]   LIPASE    Narrative:     Performed at:  [facility]  [address] [facility]  [place], [address] [facility]   Phone [phone]   TROPONIN    Narrative:     Performed at:  [facility]  [address] [facility]  [place], [address] [facility]   Phone [phone]       All other labs were within normal range or not returned as of this dictation. EKG: All EKG's are interpreted by the Emergency Department Physician in the absence of a cardiologist.  Please see their note for interpretation of EKG. RADIOLOGY:   Non-plain film images such as CT, Ultrasound and MRI are read by the radiologist. Plain radiographic images are visualized and preliminarily interpreted by the ED Provider with the below findings:        Interpretation per the Radiologist below, if available at the time of this note:    CT ABDOMEN PELVIS W IV CONTRAST Additional Contrast? None   Final Result   2 cystic lesions in the uncinate process measuring up to 1.9 x 0.8 cm. No   pancreatic duct dilatation. Recommend further evaluation with nonemergent   outpatient MRI of the pancreas with contrast.             PROCEDURES   Unless otherwise noted below, none     Procedures    CRITICAL CARE TIME   N/A    CONSULTS:  None      EMERGENCY DEPARTMENT COURSE and DIFFERENTIAL DIAGNOSIS/MDM:   Vitals:    Vitals:    11/28/20 1600 11/28/20 1630 11/28/20 1635 11/28/20 1645   BP:  106/68 106/68    Pulse:       Resp:       Temp:       TempSrc:       SpO2: 98%  97% 98%   Weight:       Height:           Patient was given the following medications:  Medications   0.9 % sodium chloride bolus (1,000 mLs Intravenous New Bag 11/28/20 1647)   pantoprazole (PROTONIX) injection 40 mg (40 mg Intravenous Given 11/28/20 1449)   iopamidol (ISOVUE-370) 76 % injection 75 mL (75 mLs Intravenous Given 11/28/20 1552)           The patient's detailed history of present illness is documented as above.  Upon arrival to the emergency department the patient's vital signs are as on the outstanding test results and assume the final disposition of this patient. Please see attending physician note for further medical decision making, consultations, and final disposition of this patient. FINAL IMPRESSION      1. Abdominal pain, epigastric    2. Pancreatic cyst (two) uncinate process    3. Hypotension, unspecified hypotension type          DISPOSITION/PLAN   DISPOSITION: Pending for likely discharge to home.       PATIENT REFERREDTO:  Teddy Sanchez MD  [address] [place] [address]  [phone]      You will need to have an MRI of your pancreas with contrast better evaluate the cysts    [facility] Emergency Department  [address] [place] [address]  [phone]    If symptoms worsen      DISCHARGE MEDICATIONS:  New Prescriptions    DICYCLOMINE (BENTYL) 10 MG CAPSULE    Take 1 capsule by mouth every 6 hours as needed (cramps)    ONDANSETRON (ZOFRAN ODT) 4 MG DISINTEGRATING TABLET    Take 1 tablet by mouth every 8 hours as needed for Nausea       DISCONTINUED MEDICATIONS:  Discontinued Medications    No medications on file              (Please note that portions of this note were completed with a voice recognition program.  Efforts were made to edit the dictations but occasionally words are mis-transcribed.)    [place] PA-C (electronically signed)           Irma Garcia PA-C  11/28/20 [phone]

## 2020-11-28 NOTE — ED PROVIDER NOTES
I independently performed a history and physical on Deloris Rodriguez. All diagnostic, treatment, and disposition decisions were made by myself in conjunction with the advanced practice provider. Briefly, this is a 48 y.o. female here for a gastric abdominal pain that started after she ate pizza last night. She does have a history of acid reflux for which she is taking Pepcid as needed. Does not have any pain on arrival to the emergency room but wanted to be checked out. Never had any chest pain or shortness of breath. No dysuria, frequency or flank pain. On exam pt is resting comfortably  Cardiac RRR, no murmur  Lungs clear bilaterally, no increased work of breathing  Abdomen soft nontender  No calf edema or tenderness  Neuro no drift in extremities       EKG  The Ekg interpreted by me in the absence of a cardiologist shows. sinus bradycardia, rate=77   Axis is   Normal  QTc is  normal  Intervals and Durations are unremarkable. No specific ST-T wave changes appreciated. No evidence of acute ischemia.    No priors     Medications   pantoprazole (PROTONIX) injection 40 mg (40 mg Intravenous Given 11/28/20 1449)   iopamidol (ISOVUE-370) 76 % injection 75 mL (75 mLs Intravenous Given 11/28/20 1552)   0.9 % sodium chloride bolus (0 mLs Intravenous Stopped 11/28/20 1823)     Labs Reviewed   CBC WITH AUTO DIFFERENTIAL - Abnormal; Notable for the following components:       Result Value    Neutrophils Absolute 8.5 (*)     All other components within normal limits    Narrative:     Performed at:  [facility] [address], [place], [facility] [employer]   Phone [phone]   COMPREHENSIVE METABOLIC PANEL - Abnormal; Notable for the following components:    Glucose 123 (*)     All other components within normal limits    Narrative:     Performed at:  [facility]  [address] [facility], [facility] [employer]   Phone [phone]   [address] [place] [address] CULTURE - Abnormal; Notable for the following components:    Clarity, UA CLOUDY (*)     Ketones, Urine 40 (*)     All other components within normal limits    Narrative:     Performed at:  [facility] [address], [facility], [address] [employer]   Phone [phone]   MICROSCOPIC URINALYSIS - Abnormal; Notable for the following components:    Bacteria, UA 1+ (*)     Epithelial Cells, UA 14 (*)     All other components within normal limits    Narrative:     Performed at:  [facility] [address], [facility], [facility] [employer]   Phone [phone]   HCG, SERUM, QUALITATIVE    Narrative:     Performed at:  [facility] [address], [facility], [facility] [employer]   Phone [phone]   LIPASE    Narrative:     Performed at:  [facility] [address], [facility], [facility] [employer]   Phone [phone]   TROPONIN    Narrative:     Performed at:  [facility] [address], [facility], [facility] [employer]   Phone [phone]       CBC: no clinically significant anemia, leukocytosis, thrombocytopeniaBMP: no clinically significant electrolyte derangement or REESE  Lipase wnl  LFTs showing no evidence of acute hepatic injury, transaminitis or hyperbilirubinemia. UA not c/w infection     Patient appears comfortable on my evaluation and has a nontender nonperitoneal abdomen. Vitals are unremarkable. Low suspicion for acute torsion or PID. CT of the abdomen pelvis is not showing any signs of cholecystitis, perforation, obstruction or appendicitis. Laboratory studies as above are unremarkable. She did drop her blood pressure to the 71Q systolic briefly and tells me that she was sleeping. Blood pressure improved with minimal intervention and she did stay for a normal saline bolus with full normalization of blood pressure.   Her EKG is nonischemic and her troponin is unremarkable at this time.  Low suspicion for ACS today. She was instructed to return to the emergency room for any worsening abdominal pain, fever or inability to tolerate p.o., chest pain or trouble breathing. She understands that today her risk of major cardiac event is less than 2% in the next month. She is otherwise stable for PCP follow-up and is agreeable to this plan. Patient Referrals:  Alexey Almanza MD  [address] [place] [address] [address]  [phone]      You will need to have an MRI of your pancreas with contrast better evaluate the cysts    [facility] Emergency Department  [address]  [phone]    If symptoms worsen    Alexey Almanza MD  [address] [place] [address]  [phone]    In 3 days        Discharge Medications:  Discharge Medication List as of 11/28/2020  6:28 PM      START taking these medications    Details   dicyclomine (BENTYL) 10 MG capsule Take 1 capsule by mouth every 6 hours as needed (cramps), Disp-20 capsule,R-0Print      ondansetron (ZOFRAN ODT) 4 MG disintegrating tablet Take 1 tablet by mouth every 8 hours as needed for Nausea, Disp-20 tablet,R-0Print      esomeprazole (NEXIUM) 40 MG delayed release capsule Take 1 capsule by mouth every morning (before breakfast), Disp-30 capsule,R-0Print             FINAL IMPRESSION  1. Abdominal pain, epigastric    2. Pancreatic cyst (two) uncinate process    3. Hypotension, unspecified hypotension type        Blood pressure 122/61, pulse 90, temperature 98.5 °F (36.9 °C), temperature source Oral, resp. rate 17, height 5' 2\" (1.575 m), weight 210 lb (95.3 kg), last menstrual period 01/31/2014, SpO2 99 %.      For [facility], please see documentation by advanced practice provider       Kaylin Mcneil MD  11/28/20 2028

## 2020-11-29 LAB
EKG ATRIAL RATE: 77 BPM
EKG DIAGNOSIS: NORMAL
EKG P AXIS: 57 DEGREES
EKG P-R INTERVAL: 154 MS
EKG Q-T INTERVAL: 382 MS
EKG QRS DURATION: 78 MS
EKG QTC CALCULATION (BAZETT): 432 MS
EKG R AXIS: 46 DEGREES
EKG T AXIS: 53 DEGREES
EKG VENTRICULAR RATE: 77 BPM

## 2020-11-29 PROCEDURE — 93010 ELECTROCARDIOGRAM REPORT: CPT | Performed by: INTERNAL MEDICINE

## 2020-12-09 ENCOUNTER — OFFICE VISIT (OUTPATIENT)
Dept: INTERNAL MEDICINE CLINIC | Age: 55
End: 2020-12-09
Payer: COMMERCIAL

## 2020-12-09 VITALS
WEIGHT: 220 LBS | TEMPERATURE: 96.4 F | DIASTOLIC BLOOD PRESSURE: 80 MMHG | SYSTOLIC BLOOD PRESSURE: 110 MMHG | HEIGHT: 62 IN | HEART RATE: 88 BPM | BODY MASS INDEX: 40.48 KG/M2

## 2020-12-09 PROCEDURE — 99213 OFFICE O/P EST LOW 20 MIN: CPT | Performed by: FAMILY MEDICINE

## 2020-12-09 RX ORDER — LEVOTHYROXINE SODIUM 0.1 MG/1
TABLET ORAL
Qty: 90 TABLET | Refills: 3 | Status: SHIPPED | OUTPATIENT
Start: 2020-12-09 | End: 2021-04-11 | Stop reason: DRUGHIGH

## 2020-12-09 RX ORDER — ESOMEPRAZOLE MAGNESIUM 40 MG/1
40 CAPSULE, DELAYED RELEASE ORAL
Qty: 30 CAPSULE | Refills: 0 | Status: SHIPPED
Start: 2020-12-09 | End: 2021-04-08 | Stop reason: ALTCHOICE

## 2020-12-09 RX ORDER — ESOMEPRAZOLE MAGNESIUM 40 MG/1
CAPSULE, DELAYED RELEASE ORAL
Qty: 15 CAPSULE | Refills: 0
Start: 2020-12-09 | End: 2021-04-08 | Stop reason: ALTCHOICE

## 2020-12-09 ASSESSMENT — PATIENT HEALTH QUESTIONNAIRE - PHQ9
SUM OF ALL RESPONSES TO PHQ QUESTIONS 1-9: 0
2. FEELING DOWN, DEPRESSED OR HOPELESS: 0
SUM OF ALL RESPONSES TO PHQ QUESTIONS 1-9: 0
1. LITTLE INTEREST OR PLEASURE IN DOING THINGS: 0
SUM OF ALL RESPONSES TO PHQ9 QUESTIONS 1 & 2: 0
SUM OF ALL RESPONSES TO PHQ QUESTIONS 1-9: 0

## 2020-12-09 NOTE — PROGRESS NOTES
 Drug use: No         Review of Systems   Constitutional: Negative for fever and unexpected weight change. Cardiovascular: Negative for chest pain. Gastrointestinal: Negative for abdominal distention, abdominal pain (not currently), blood in stool, constipation, diarrhea, nausea and vomiting. Genitourinary: Negative for difficulty urinating, flank pain and hematuria. Objective:   Physical Exam  Vitals signs reviewed. Constitutional:       General: She is not in acute distress. Appearance: She is well-developed. She is obese. She is not diaphoretic. Cardiovascular:      Rate and Rhythm: Normal rate and regular rhythm. Heart sounds: Normal heart sounds. Pulmonary:      Effort: Pulmonary effort is normal.      Breath sounds: Normal breath sounds. No rales. Abdominal:      General: Bowel sounds are normal. There is no distension or abdominal bruit. Palpations: Abdomen is soft. There is no mass. Tenderness: There is no abdominal tenderness. There is no guarding or rebound. Hernia: No hernia is present. Skin:     General: Skin is warm and dry. Coloration: Skin is not pale. Findings: No rash. Psychiatric:         Behavior: Behavior normal.          CT of abdomen/pelvis. 2 cystic lesions in the uncinate process measuring up to 1.9 x 0.8 cm.  No    pancreatic duct dilatation.  Recommend further evaluation with nonemergent    outpatient MRI of the pancreas with contrast.             Body mass index is 40.24 kg/m².     Wt Readings from Last 3 Encounters:   12/09/20 220 lb (99.8 kg)   11/28/20 210 lb (95.3 kg)   09/10/20 213 lb (96.6 kg)     Temp Readings from Last 3 Encounters:   12/09/20 96.4 °F (35.8 °C) (Temporal)   11/28/20 98.5 °F (36.9 °C) (Oral)   09/10/20 99 °F (37.2 °C) (Temporal)     BP Readings from Last 3 Encounters:   12/09/20 110/80   11/28/20 122/61   09/10/20 126/84     Pulse Readings from Last 3 Encounters:   12/09/20 88   11/28/20 90   09/10/20 100 I reviewed ER notes. Assessment:      1. Pancreatic cyst  Needs MRI to evaluate further. Normal lipase and doubt this was cause of her pain. - MRI ABDOMEN W CONTRAST; Future  - Creatinine, Serum; Future    2. Medication monitoring encounter  Will need IV contrast.    - Creatinine, Serum; Future    3. Indigestion  Continue PPI but try to work dose down to qod after 2 weeks of daily use. If breakthrough, can consider 20 mg daily. - esomeprazole (NEXIUM) 40 MG delayed release capsule; Take 1 every other day on Tues, Thurs, Sat, Sun and skip Mon, Wed, Fri  Dispense: 15 capsule; Refill: 0    4. Acquired hypothyroidism  - levothyroxine (SYNTHROID) 100 MCG tablet; TAKE 1 TABLET BY MOUTH DAILY  Dispense: 90 tablet; Refill: 3          Plan:      See orders. See after visit summary, patient instructions, and reference hand-outs. A PRINTED SUMMARY = AVS GIVEN TO THE PATIENT, (or if Virtual Visit, patient told it is available in My Chart or will be mailed if not active on My Chart.)    Discussed use, benefit, and side effects of prescribed medications. Barriers to medication compliance addressed. All patient questions answered.           Gabino Bowles MD

## 2020-12-12 PROBLEM — E66.01 SEVERE OBESITY (BMI >= 40) (HCC): Status: ACTIVE | Noted: 2018-01-04

## 2020-12-12 ASSESSMENT — ENCOUNTER SYMPTOMS
VOMITING: 0
ABDOMINAL DISTENTION: 0
BLOOD IN STOOL: 0
CONSTIPATION: 0
ABDOMINAL PAIN: 0
NAUSEA: 0
DIARRHEA: 0

## 2020-12-29 DIAGNOSIS — Z51.81 MEDICATION MONITORING ENCOUNTER: ICD-10-CM

## 2020-12-29 DIAGNOSIS — K86.2 PANCREATIC CYST: ICD-10-CM

## 2020-12-29 LAB
CREAT SERPL-MCNC: 0.7 MG/DL (ref 0.6–1.1)
GFR AFRICAN AMERICAN: >60
GFR NON-AFRICAN AMERICAN: >60

## 2020-12-31 ENCOUNTER — HOSPITAL ENCOUNTER (OUTPATIENT)
Dept: WOMENS IMAGING | Age: 55
Discharge: HOME | End: 2020-12-31
Payer: COMMERCIAL

## 2020-12-31 DIAGNOSIS — Z12.31 VISIT FOR SCREENING MAMMOGRAM: ICD-10-CM

## 2020-12-31 PROCEDURE — 77067 SCR MAMMO BI INCL CAD: CPT

## 2021-01-11 ENCOUNTER — TELEPHONE (OUTPATIENT)
Dept: INTERNAL MEDICINE CLINIC | Age: 56
End: 2021-01-11

## 2021-01-11 NOTE — TELEPHONE ENCOUNTER
Pt calling about her MRI directions---there is a pill they are saying she should take but doesn't know what that is---can you please call the pt. Thanks.

## 2021-01-20 DIAGNOSIS — K86.2 PANCREATIC CYST: Primary | ICD-10-CM

## 2021-02-02 DIAGNOSIS — Z79.890 HORMONE REPLACEMENT THERAPY: ICD-10-CM

## 2021-02-02 RX ORDER — ESTRADIOL 1 MG/1
TABLET ORAL
Qty: 135 TABLET | Refills: 3 | Status: SHIPPED | OUTPATIENT
Start: 2021-02-02 | End: 2021-04-08

## 2021-02-04 ENCOUNTER — TELEPHONE (OUTPATIENT)
Dept: ADMINISTRATIVE | Age: 56
End: 2021-02-04

## 2021-02-04 NOTE — TELEPHONE ENCOUNTER
Submitted PA for Estradiol 1MG tablets. Key: [id number]. Per plan-Drug is covered by current benefit plan. No further PA activity needed. Please notify patient. Thank you.

## 2021-04-08 ENCOUNTER — OFFICE VISIT (OUTPATIENT)
Dept: INTERNAL MEDICINE CLINIC | Age: 56
End: 2021-04-08
Payer: COMMERCIAL

## 2021-04-08 VITALS
HEIGHT: 62 IN | WEIGHT: 220 LBS | DIASTOLIC BLOOD PRESSURE: 82 MMHG | SYSTOLIC BLOOD PRESSURE: 126 MMHG | BODY MASS INDEX: 40.48 KG/M2 | HEART RATE: 76 BPM

## 2021-04-08 DIAGNOSIS — Z00.00 ANNUAL PHYSICAL EXAM: Primary | ICD-10-CM

## 2021-04-08 DIAGNOSIS — E66.01 SEVERE OBESITY (BMI >= 40) (HCC): ICD-10-CM

## 2021-04-08 DIAGNOSIS — R20.0 NUMBNESS OF RIGHT FOOT: ICD-10-CM

## 2021-04-08 DIAGNOSIS — Z79.890 HORMONE REPLACEMENT THERAPY: ICD-10-CM

## 2021-04-08 DIAGNOSIS — K86.2 PANCREATIC CYST: ICD-10-CM

## 2021-04-08 DIAGNOSIS — E03.9 ACQUIRED HYPOTHYROIDISM: ICD-10-CM

## 2021-04-08 LAB
A/G RATIO: 1.5 (ref 1.1–2.2)
ALBUMIN SERPL-MCNC: 4.4 G/DL (ref 3.4–5)
ALP BLD-CCNC: 59 U/L (ref 40–129)
ALT SERPL-CCNC: 30 U/L (ref 10–40)
ANION GAP SERPL CALCULATED.3IONS-SCNC: 9 MMOL/L (ref 3–16)
AST SERPL-CCNC: 28 U/L (ref 15–37)
BASOPHILS ABSOLUTE: 0.1 K/UL (ref 0–0.2)
BASOPHILS RELATIVE PERCENT: 1.2 %
BILIRUB SERPL-MCNC: 0.4 MG/DL (ref 0–1)
BUN BLDV-MCNC: 9 MG/DL (ref 7–20)
CALCIUM SERPL-MCNC: 9.8 MG/DL (ref 8.3–10.6)
CHLORIDE BLD-SCNC: 103 MMOL/L (ref 99–110)
CHOLESTEROL, TOTAL: 186 MG/DL (ref 0–199)
CO2: 29 MMOL/L (ref 21–32)
CREAT SERPL-MCNC: 0.9 MG/DL (ref 0.6–1.1)
EOSINOPHILS ABSOLUTE: 0.4 K/UL (ref 0–0.6)
EOSINOPHILS RELATIVE PERCENT: 5.6 %
GFR AFRICAN AMERICAN: >60
GFR NON-AFRICAN AMERICAN: >60
GLOBULIN: 2.9 G/DL
GLUCOSE BLD-MCNC: 102 MG/DL (ref 70–99)
HCT VFR BLD CALC: 45 % (ref 36–48)
HDLC SERPL-MCNC: 56 MG/DL (ref 40–60)
HEMOGLOBIN: 15.2 G/DL (ref 12–16)
HEPATITIS C ANTIBODY INTERPRETATION: NORMAL
LDL CHOLESTEROL CALCULATED: 93 MG/DL
LYMPHOCYTES ABSOLUTE: 2 K/UL (ref 1–5.1)
LYMPHOCYTES RELATIVE PERCENT: 25.5 %
MCH RBC QN AUTO: 33.3 PG (ref 26–34)
MCHC RBC AUTO-ENTMCNC: 33.8 G/DL (ref 31–36)
MCV RBC AUTO: 98.8 FL (ref 80–100)
MONOCYTES ABSOLUTE: 0.7 K/UL (ref 0–1.3)
MONOCYTES RELATIVE PERCENT: 8.4 %
NEUTROPHILS ABSOLUTE: 4.7 K/UL (ref 1.7–7.7)
NEUTROPHILS RELATIVE PERCENT: 59.3 %
PDW BLD-RTO: 13.9 % (ref 12.4–15.4)
PLATELET # BLD: 290 K/UL (ref 135–450)
PMV BLD AUTO: 12.1 FL (ref 5–10.5)
POTASSIUM SERPL-SCNC: 4.5 MMOL/L (ref 3.5–5.1)
RBC # BLD: 4.55 M/UL (ref 4–5.2)
SODIUM BLD-SCNC: 141 MMOL/L (ref 136–145)
T4 FREE: 1.3 NG/DL (ref 0.9–1.8)
TOTAL PROTEIN: 7.3 G/DL (ref 6.4–8.2)
TRIGL SERPL-MCNC: 183 MG/DL (ref 0–150)
TSH REFLEX: 7.49 UIU/ML (ref 0.27–4.2)
VLDLC SERPL CALC-MCNC: 37 MG/DL
WBC # BLD: 7.9 K/UL (ref 4–11)

## 2021-04-08 PROCEDURE — 99396 PREV VISIT EST AGE 40-64: CPT | Performed by: FAMILY MEDICINE

## 2021-04-08 RX ORDER — ESTRADIOL 1 MG/1
TABLET ORAL
Qty: 90 TABLET | Refills: 0
Start: 2021-04-08 | End: 2022-02-07

## 2021-04-08 ASSESSMENT — PATIENT HEALTH QUESTIONNAIRE - PHQ9
SUM OF ALL RESPONSES TO PHQ QUESTIONS 1-9: 0
SUM OF ALL RESPONSES TO PHQ QUESTIONS 1-9: 0

## 2021-04-08 NOTE — PROGRESS NOTES
of motion. No edema. Lymphadenopathy:     No cervical adenopathy. No axillary adenopathy. No supraclavicular adenopathy. No Inguinal adenopathy. Neurological: Alert and oriented to person, place, and time. Normal reflexes. No tremor. No cranial nerve deficit. She exhibits normal muscle tone. Coordination and gait normal.   Skin: Skin is warm and dry. No rash noted. Not diaphoretic. No cyanosis. No pallor. Nails show no clubbing. Psychiatric:  Normal mood and affect. Speech is normal and behavior is normal. Cognition and memory are normal.       The 10-year ASCVD risk score (Cordell Woods et al., 2013) is: 1.3%    Values used to calculate the score:      Age: 48 years      Sex: Female      Is Non- : No      Diabetic: No      Tobacco smoker: No      Systolic Blood Pressure: 575 mmHg      Is BP treated: No      HDL Cholesterol: 49 mg/dL      Total Cholesterol: 170 mg/dL      Assessment:      1. Annual physical exam  Shingles vaccine after COVID vaccines completed. - Comprehensive Metabolic Panel  - CBC Auto Differential  - Lipid Panel  - TSH with Reflex  - Hepatitis C Antibody    2. Acquired hypothyroidism  Euthyroid on current LT4 replacement. 3. Numbness of right foot  See podiatrist or return visit. - Amb External Referral To Podiatry    4. Hormone replacement therapy  Discussed weaning dose. Now at 1 mg alternating with 2 mg. Suggest 1 mg daily for 6 months and then try to reduce to 1/2 tablet every other with 1 mg every other.  - estradiol (ESTRACE) 1 MG tablet; Take 1 pill daily  Dispense: 90 tablet; Refill: 0      5. Severe obesity (BMI >= 40) (HCC)  Encouraged weight loss. Offered behavioral counseling. She will think about this. 6. Pancreatic cyst  Discussed MRI surveillance.           Health Maintenance Due   Topic Date Due    Hepatitis C screen  Never done    Shingles Vaccine (1 of 2) Never done    COVID-19 Vaccine (2 - Pfizer 2-dose series)

## 2021-04-08 NOTE — PATIENT INSTRUCTIONS
The pancreas scans are recommended every 6 months for a total of 4 scans so after July/August, then 2 next year. xxxxxxxxxxxxxxxxxxxxxxxxx[id number]      Patient Education        Well Visit, Women 48 to 72: Care Instructions  Overview  Well visits can help you stay healthy. Your doctor has checked your overall health and may have suggested ways to take good care of yourself. Your doctor also may have recommended tests. At home, you can help prevent illness with healthy eating, regular exercise, and other steps. Follow-up care is a key part of your treatment and safety. Be sure to make and go to all appointments, and call your doctor if you are having problems. It's also a good idea to know your test results and keep a list of the medicines you take. How can you care for yourself at home? · Get screening tests that you and your doctor decide on. Screening helps find diseases before any symptoms appear. · Eat healthy foods. Choose fruits, vegetables, whole grains, protein, and low-fat dairy foods. Limit fat, especially saturated fat. Reduce salt in your diet. · Limit alcohol. Have no more than 1 drink a day or 7 drinks a week. · Get at least 30 minutes of exercise on most days of the week. Walking is a good choice. You also may want to do other activities, such as running, swimming, cycling, or playing tennis or team sports. · Reach and stay at a healthy weight. This will lower your risk for many problems, such as obesity, diabetes, heart disease, and high blood pressure. · Do not smoke. Smoking can make health problems worse. If you need help quitting, talk to your doctor about stop-smoking programs and medicines. These can increase your chances of quitting for good. · Care for your mental health. It is easy to get weighed down by worry and stress. Learn strategies to manage stress, like deep breathing and mindfulness, and stay connected with your family and community.  If you find you often feel sad or hopeless, talk with your doctor. Treatment can help. · Talk to your doctor about whether you have any risk factors for sexually transmitted infections (STIs). You can help prevent STIs if you wait to have sex with a new partner (or partners) until you've each been tested for STIs. It also helps if you use condoms (male or female condoms) and if you limit your sex partners to one person who only has sex with you. Vaccines are available for some STIs. · If you think you may have a problem with alcohol or drug use, talk to your doctor. This includes prescription medicines (such as amphetamines and opioids) and illegal drugs (such as cocaine and methamphetamine). Your doctor can help you figure out what type of treatment is best for you. · Protect your skin from too much sun. When you're outdoors from 10 a.m. to 4 p.m., stay in the shade or cover up with clothing and a hat with a wide brim. Wear sunglasses that block UV rays. Even when it's cloudy, put broad-spectrum sunscreen (SPF 30 or higher) on any exposed skin. · See a dentist one or two times a year for checkups and to have your teeth cleaned. · Wear a seat belt in the car. When should you call for help? Watch closely for changes in your health, and be sure to contact your doctor if you have any problems or symptoms that concern you. Where can you learn more? Go to https://Humariel.health-partners. org and sign in to your [employer] account. Enter D496 in the [facility] box to learn more about \"Well Visit, Women 50 to 72: Care Instructions. \"     If you do not have an account, please click on the \"Sign Up Now\" link. Current as of: May 27, 2020               Content Version: 12.8  © 2006-2021 Healthwise, Incorporated. Care instructions adapted under license by [facility] ([facility]).  If you have questions about a medical condition or this instruction, always ask your healthcare professional. [address] any warranty or liability for your use of this information. Patient Education        Eating Healthy Foods: Care Instructions  Your Care Instructions  Eating healthy foods can help lower your risk for disease. Healthy food gives you energy and keeps your heart strong, your brain active, your muscles working, and your bones strong. A healthy diet includes a variety of foods from the basic food groups: grains, vegetables, fruits, milk and milk products, and meat and beans. Some people may eat more of their favorite foods from only one food group and, as a result, miss getting the nutrients they need. So, it is important to pay attention not only to what you eat but also to what you are missing from your diet. You can eat a healthy, balanced diet by making a few small changes. Follow-up care is a key part of your treatment and safety. Be sure to make and go to all appointments, and call your doctor if you are having problems. It's also a good idea to know your test results and keep a list of the medicines you take. How can you care for yourself at home? Look at what you eat  · Keep a food diary for a week or two and record everything you eat or drink. Track the number of servings you eat from each food group. · For a balanced diet every day, eat a variety of:  ? 6 or more ounce-equivalents of grains, such as cereals, breads, crackers, rice, or pasta, every day. An ounce-equivalent is 1 slice of bread, 1 cup of ready-to-eat cereal, or ½ cup of cooked rice, cooked pasta, or cooked cereal.  ? 2½ cups of vegetables, especially:  § Dark-green vegetables such as broccoli and spinach. § Orange vegetables such as carrots and sweet potatoes. § Dry beans (such as perry and kidney beans) and peas (such as lentils). ? 2 cups of fresh, frozen, or canned fruit. A small apple or 1 banana or orange equals 1 cup. ? 3 cups of nonfat or low-fat milk, yogurt, or other milk products.   ? 5½ ounces of meat and beans, such as chicken, fish, lean meat, beans, nuts, and seeds. One egg, 1 tablespoon of peanut butter, ½ ounce nuts or seeds, or ¼ cup of cooked beans equals 1 ounce of meat. · Learn how to read food labels for serving sizes and ingredients. Fast-food and convenience-food meals often contain few or no fruits or vegetables. Make sure you eat some fruits and vegetables to make the meal more nutritious. · Look at your food diary. For each food group, add up what you have eaten and then divide the total by the number of days. This will give you an idea of how much you are eating from each food group. See if you can find some ways to change your diet to make it more healthy. Start small  · Do not try to make dramatic changes to your diet all at once. You might feel that you are missing out on your favorite foods and then be more likely to fail. · Start slowly, and gradually change your habits. Try some of the following:  ? Use whole wheat bread instead of white bread. ? Use nonfat or low-fat milk instead of whole milk. ? Eat brown rice instead of white rice, and eat whole wheat pasta instead of white-flour pasta. ? Try low-fat cheeses and low-fat yogurt. ? Add more fruits and vegetables to meals and have them for snacks. ? Add lettuce, tomato, cucumber, and onion to sandwiches. ? Add fruit to yogurt and cereal.  Enjoy food  · You can still eat your favorite foods. You just may need to eat less of them. If your favorite foods are high in fat, salt, and sugar, limit how often you eat them, but do not cut them out entirely. · Eat a wide variety of foods. Make healthy choices when eating out  · The type of restaurant you choose can help you make healthy choices. Even fast-food chains are now offering more low-fat or healthier choices on the menu. · Choose smaller portions, or take half of your meal home. · When eating out, try:  ? A veggie pizza with a whole wheat crust or grilled chicken (instead of sausage or pepperoni). problems. Or maybe you want to eat a healthier diet so you can lose weight. If you have high blood pressure, your reason may be clear: to lower your blood pressure. Maybe you smoke and want to save money on cigarettes. You need to feel ready to make a change. If you don't feel ready now, that's okay. You can still be thinking and planning. When you truly want to make changes, you're ready for the next step. It's not easy to change habitsbut you can do it. Taking the time to really think about what will motivate or inspire you will help you reach your goals. How do you set goals? Setting goals can help a lot when you're trying to make a healthy change. · Focus on small goals. This will help you reach larger goals over time. With smaller goals, you'll have success more often, which will help you stay with it. For example, your large goal may be to lose 20 pounds. Your small goal could be to lose 5.  · Write down your goals. This will help you remember, and you'll have a clearer idea of what you want to achieve. Use a journal or notebook to record your goals. Hang up your plan where you will see it often as a reminder of what you're trying to do. · Make your goals specific. Specific goals help you measure your progress. For example, setting a goal to eat one extra serving of vegetables a day is better than a general goal to \"eat more vegetables. \"  · Focus on one goal at a time. By doing this, you're less likely to feel overwhelmed and then give up. · When you reach a goal, reward yourself. Celebrate your new behavior and success for several days, and then think about setting your next goal.  How can you prepare for slip-ups? It's perfectly normal to try to change a habit, go along fine for a while, and then have a setback. Lots of people try and try again before they reach their goals. What are the things that might cause a setback for you?  If you have tried to change a habit before, think about what helped you and what got in your way. By thinking about these barriers now, you can plan ahead for how to deal with them if they happen. There will be times when you slip up and don't make your goal for the week. When that happens, don't get mad at yourself. Learn from the experience. Ask yourself what got in the way of reaching your goal. Positive thinking goes a long way when you're making lifestyle changes. How can you get support? · Get a partner. It's motivating to know that someone is trying to make the same change that you're making, like being more active or changing your eating habits. You have someone who is counting on you to help him or her succeed. That person can also remind you how far you've come. · Get friends and family involved. They can exercise with you or encourage you by saying how they admire what you are doing. Family members can join you in your healthy eating efforts. Don't be afraid to tell family and friends that their encouragement makes a big difference to you. · Join a class or support group. People in these groups often have some of the same barriers you have. They can give you support when you don't feel like staying with your plan. They can boost your morale when you need a lift. Megan Cespedes also find a number of online support groups. · Encourage yourself. When you feel like giving up, don't waste energy feeling bad about yourself. Remember your reason for wanting to change, think about the progress you've made, and give yourself a pep talk and a pat on the back. · Get professional help. A dietitian can help you make your diet healthier while still allowing you to eat foods that you enjoy. A  or physical therapist can help design an exercise program that is fun and easy to stay on. A counselor, a , or your doctor can help you overcome hurdles, reduce stress, or quit smoking. Where can you learn more? Go to https://mae.healthTerareconpartners. org and sign in to your [employer] account. Enter M149 in the [facility] box to learn more about \"Learning About Changing a Habit by Setting Goals. \"     If you do not have an account, please click on the \"Sign Up Now\" link. Current as of: September 23, 2020               Content Version: 12.8  © 4334-4548 Healthwise, Incorporated. Care instructions adapted under license by [facility] ([facility]). If you have questions about a medical condition or this instruction, always ask your healthcare professional. [address] any warranty or liability for your use of this information.

## 2021-04-11 DIAGNOSIS — E03.9 ACQUIRED HYPOTHYROIDISM: Primary | ICD-10-CM

## 2021-04-11 RX ORDER — LEVOTHYROXINE SODIUM 112 UG/1
112 TABLET ORAL DAILY
Qty: 90 TABLET | Refills: 1 | Status: SHIPPED | OUTPATIENT
Start: 2021-04-11 | End: 2021-10-11

## 2021-05-27 ENCOUNTER — PROCEDURE VISIT (OUTPATIENT)
Dept: NEUROLOGY | Age: 56
End: 2021-05-27
Payer: COMMERCIAL

## 2021-05-27 DIAGNOSIS — R20.0 BILATERAL LEG NUMBNESS: Primary | ICD-10-CM

## 2021-05-27 PROCEDURE — 95909 NRV CNDJ TST 5-6 STUDIES: CPT | Performed by: PSYCHIATRY & NEUROLOGY

## 2021-05-27 PROCEDURE — 95886 MUSC TEST DONE W/N TEST COMP: CPT | Performed by: PSYCHIATRY & NEUROLOGY

## 2022-01-09 DIAGNOSIS — E03.9 ACQUIRED HYPOTHYROIDISM: ICD-10-CM

## 2022-01-10 RX ORDER — LEVOTHYROXINE SODIUM 112 UG/1
112 TABLET ORAL DAILY
Qty: 30 TABLET | Refills: 0 | Status: SHIPPED | OUTPATIENT
Start: 2022-01-10 | End: 2022-02-07

## 2022-01-20 ENCOUNTER — OFFICE VISIT (OUTPATIENT)
Dept: INTERNAL MEDICINE CLINIC | Age: 57
End: 2022-01-20
Payer: COMMERCIAL

## 2022-01-20 VITALS
DIASTOLIC BLOOD PRESSURE: 86 MMHG | HEART RATE: 72 BPM | WEIGHT: 216 LBS | BODY MASS INDEX: 39.75 KG/M2 | HEIGHT: 62 IN | SYSTOLIC BLOOD PRESSURE: 120 MMHG

## 2022-01-20 DIAGNOSIS — Z23 NEED FOR SHINGLES VACCINE: ICD-10-CM

## 2022-01-20 DIAGNOSIS — E03.9 ACQUIRED HYPOTHYROIDISM: ICD-10-CM

## 2022-01-20 DIAGNOSIS — R73.9 HYPERGLYCEMIA: ICD-10-CM

## 2022-01-20 DIAGNOSIS — E66.9 OBESITY, CLASS II, BMI 35-39.9: ICD-10-CM

## 2022-01-20 DIAGNOSIS — Z00.00 ANNUAL PHYSICAL EXAM: Primary | ICD-10-CM

## 2022-01-20 DIAGNOSIS — Z12.11 COLON CANCER SCREENING: ICD-10-CM

## 2022-01-20 PROBLEM — E66.812 OBESITY, CLASS II, BMI 35-39.9: Status: ACTIVE | Noted: 2022-01-20

## 2022-01-20 LAB
A/G RATIO: 1.5 (ref 1.1–2.2)
ALBUMIN SERPL-MCNC: 4.4 G/DL (ref 3.4–5)
ALP BLD-CCNC: 78 U/L (ref 40–129)
ALT SERPL-CCNC: 36 U/L (ref 10–40)
ANION GAP SERPL CALCULATED.3IONS-SCNC: 13 MMOL/L (ref 3–16)
AST SERPL-CCNC: 31 U/L (ref 15–37)
BASOPHILS ABSOLUTE: 0.1 K/UL (ref 0–0.2)
BASOPHILS RELATIVE PERCENT: 0.9 %
BILIRUB SERPL-MCNC: 0.5 MG/DL (ref 0–1)
BUN BLDV-MCNC: 7 MG/DL (ref 7–20)
CALCIUM SERPL-MCNC: 9.7 MG/DL (ref 8.3–10.6)
CHLORIDE BLD-SCNC: 101 MMOL/L (ref 99–110)
CHOLESTEROL, FASTING: 195 MG/DL (ref 0–199)
CO2: 27 MMOL/L (ref 21–32)
CREAT SERPL-MCNC: 0.8 MG/DL (ref 0.6–1.1)
EOSINOPHILS ABSOLUTE: 0.2 K/UL (ref 0–0.6)
EOSINOPHILS RELATIVE PERCENT: 3.2 %
GFR AFRICAN AMERICAN: >60
GFR NON-AFRICAN AMERICAN: >60
GLUCOSE BLD-MCNC: 100 MG/DL (ref 70–99)
HCT VFR BLD CALC: 46.1 % (ref 36–48)
HDLC SERPL-MCNC: 47 MG/DL (ref 40–60)
HEMOGLOBIN: 15.4 G/DL (ref 12–16)
LDL CHOLESTEROL CALCULATED: 98 MG/DL
LYMPHOCYTES ABSOLUTE: 2 K/UL (ref 1–5.1)
LYMPHOCYTES RELATIVE PERCENT: 25.4 %
MCH RBC QN AUTO: 32.3 PG (ref 26–34)
MCHC RBC AUTO-ENTMCNC: 33.3 G/DL (ref 31–36)
MCV RBC AUTO: 97.1 FL (ref 80–100)
MONOCYTES ABSOLUTE: 0.7 K/UL (ref 0–1.3)
MONOCYTES RELATIVE PERCENT: 8.6 %
NEUTROPHILS ABSOLUTE: 4.8 K/UL (ref 1.7–7.7)
NEUTROPHILS RELATIVE PERCENT: 61.9 %
PDW BLD-RTO: 14 % (ref 12.4–15.4)
PLATELET # BLD: 298 K/UL (ref 135–450)
PMV BLD AUTO: 12 FL (ref 5–10.5)
POTASSIUM SERPL-SCNC: 4.5 MMOL/L (ref 3.5–5.1)
RBC # BLD: 4.75 M/UL (ref 4–5.2)
SODIUM BLD-SCNC: 141 MMOL/L (ref 136–145)
TOTAL PROTEIN: 7.4 G/DL (ref 6.4–8.2)
TRIGLYCERIDE, FASTING: 252 MG/DL (ref 0–150)
TSH REFLEX: 0.38 UIU/ML (ref 0.27–4.2)
VLDLC SERPL CALC-MCNC: 50 MG/DL
WBC # BLD: 7.7 K/UL (ref 4–11)

## 2022-01-20 PROCEDURE — 90471 IMMUNIZATION ADMIN: CPT | Performed by: FAMILY MEDICINE

## 2022-01-20 PROCEDURE — 90750 HZV VACC RECOMBINANT IM: CPT | Performed by: FAMILY MEDICINE

## 2022-01-20 PROCEDURE — 99396 PREV VISIT EST AGE 40-64: CPT | Performed by: FAMILY MEDICINE

## 2022-01-20 SDOH — ECONOMIC STABILITY: FOOD INSECURITY: WITHIN THE PAST 12 MONTHS, THE FOOD YOU BOUGHT JUST DIDN'T LAST AND YOU DIDN'T HAVE MONEY TO GET MORE.: NEVER TRUE

## 2022-01-20 SDOH — ECONOMIC STABILITY: FOOD INSECURITY: WITHIN THE PAST 12 MONTHS, YOU WORRIED THAT YOUR FOOD WOULD RUN OUT BEFORE YOU GOT MONEY TO BUY MORE.: NEVER TRUE

## 2022-01-20 ASSESSMENT — SOCIAL DETERMINANTS OF HEALTH (SDOH): HOW HARD IS IT FOR YOU TO PAY FOR THE VERY BASICS LIKE FOOD, HOUSING, MEDICAL CARE, AND HEATING?: NOT HARD AT ALL

## 2022-01-20 NOTE — PROGRESS NOTES
Chief Complaint   Patient presents with    Annual Exam     fasting today. PREVENTATIVE VISIT AND EXAM      Overall doing fine. Urine urgency and sometimes incontinence    She used the [employer] BP machine, her home BP was 147/91. She was taking an over the counter for menopause and quit taking it and it has been 2-3 weeks. Exercise:  Nothing regularly    Patient Active Problem List   Diagnosis    Allergic rhinitis    Asthma    Hypothyroidism    Hormone replacement therapy    Severe obesity (BMI >= 40) ([address]    Benign neoplasm of brain, unspecified ([address]    Pancreatic cyst       Past Medical History:   Diagnosis Date    Allergic rhinitis     see problem list for specific +s.  Asthma     has cats in her home    Hypothyroidism     Pelvic relaxation     Urine, incontinence, stress female 2012    Vasomotor rhinitis      Past Surgical History:   Procedure Laterality Date    CHOLECYSTECTOMY      ENDOMETRIAL ABLATION      HYSTERECTOMY, VAGINAL      INCONTINENCE SURGERY  2014    Dr. Santiago Potts       Family History   Problem Relation Age of Onset    Other Son         probable mild anxiety --- more from his paternal side    Heart Disease Mother     Hypertension Mother    [facility] Other Mother         GERD/IBS    Osteoarthritis Mother     Stroke Mother         due to uncontrolled HTN    Memory Loss Mother         may have been HTN-cinthia encephalopathy    Hypertension Father      Social History     Tobacco Use    Smoking status: Former Smoker     Packs/day: 1.00     Years: 10.00     Pack years: 10.00     Types: Cigarettes     Start date: 1988     Quit date: 1998     Years since quittin.0    Smokeless tobacco: Never Used   Vaping Use    Vaping Use: Never used   Substance Use Topics    Alcohol use: Yes     Alcohol/week: 0.0 - 1.7 standard drinks     Comment: maybe a little more in summer.     Drug use: No       Outpatient Medications Marked as Taking for the 1/20/22 encounter (Office Visit) with Hannah Blum MD   Medication Sig Dispense Refill    levothyroxine (SYNTHROID) 112 MCG tablet TAKE 1 TABLET BY MOUTH DAILY 30 tablet 0    estradiol (ESTRACE) 1 MG tablet Take 1 pill daily 90 tablet 0    fluticasone (FLOVENT HFA) 110 MCG/ACT inhaler Inhale 1 puff into the lungs 2 times daily      famotidine (PEPCID) 20 MG tablet Take 20 mg by mouth 2 times daily as needed      Triamcinolone Acetonide (NASACORT ALLERGY 24HR) 55 MCG/ACT AERO 1 spray by Nasal route daily. Use in each nostril 1 Bottle 11         ROS:  Full 12 system review done and all negative except for the following:   See HPI     Physical Exam   BP: 120/86   MD recheck. Large adult cuff. Regular cuff is too tight. Soft. Vitals:    01/20/22 0855   BP: (!) 120/90   Pulse: Body mass index is 39.51 kg/m². Wt Readings from Last 3 Encounters:   01/20/22 216 lb (98 kg)   04/08/21 220 lb (99.8 kg)   12/09/20 220 lb (99.8 kg)         Constitutional: Oriented to person, place, and time. Appears well-developed and well-nourished. No distress. HENT:   Head: Normocephalic and atraumatic. Ears: Hearing, tympanic membrane, external ear and ear canal normal.   Nose: Nose normal.   Mouth/Throat: Uvula is midline, oropharynx is clear and moist and mucous membranes are normal.   Eyes: Conjunctivae and EOM are normal. Pupils are equal, round, and reactive to light. No scleral icterus. Neck: Normal range of motion. Neck supple. Normal carotid pulses and no JVD present. Carotid bruit is not present. No tracheal deviation present. No mass and no thyromegaly present. Cardiovascular: Normal rate, regular rhythm, S1 normal, S2 normal, normal heart sounds and intact distal pulses. No murmur heard. Pulmonary/Chest: Effort normal and breath sounds normal. No stridor. No respiratory distress. No decreased breath sounds. No wheezes. No rhonchi. No rales. Abdominal: Soft.  Normal appearance and bowel sounds are normal. No distension, no abdominal bruit and no mass. There is no hepatomegaly. No tenderness. Musculoskeletal: Normal range of motion. No edema. Lymphadenopathy:     No cervical adenopathy. No axillary adenopathy. No supraclavicular adenopathy. No Inguinal adenopathy. Neurological: Alert and oriented to person, place, and time. Normal reflexes. No tremor. No cranial nerve deficit. She exhibits normal muscle tone. Coordination and gait normal.   Skin: Skin is warm and dry. No rash noted. Not diaphoretic. No cyanosis. No pallor. Nails show no clubbing. Psychiatric:  Normal mood and affect. Speech is normal and behavior is normal. Cognition and memory are normal.       The 10-year ASCVD risk score (Agus Cormier., et al., 2013) is: 1.6%    Values used to calculate the score:      Age: 55 years      Sex: Female      Is Non- : No      Diabetic: No      Tobacco smoker: No      Systolic Blood Pressure: 053 mmHg      Is BP treated: No      HDL Cholesterol: 56 mg/dL      Total Cholesterol: 186 mg/dL      Assessment:      1. Annual physical exam  Update HM issues. - Comprehensive Metabolic Panel  - CBC Auto Differential  - Lipid, Fasting  - TSH with Reflex  - Hemoglobin A1C    2. Need for shingles vaccine  - Zoster recombinant [facility])    3. Colon cancer screening  - Fecal DNA Colorectal cancer screening (Cologuard); Future    4. Hyperglycemia  R/o DM. Discussed diet and exercise. - Comprehensive Metabolic Panel  - Hemoglobin A1C    5. Obesity, Class II, BMI 35-39.9  Discussed healthy diet and weight loss advised. 6. Acquired hypothyroidism  - TSH with Reflex        Health Maintenance Due   Topic Date Due    Shingles Vaccine (1 of 2) Never done    Colon cancer screen fecal DNA test (Cologuard)  01/27/2022         Plan:    See orders and patient instructions. Age-appropriate preventative issues discussed and hand out given.     Follow up:  Return in about 1 year (around 1/20/2023) for routine physical.

## 2022-01-20 NOTE — PATIENT INSTRUCTIONS
If you do not hear from the [facility] people in 10 days, let us know. Average home BP goal is 135/85 and lower is goal.   Arm cuffs are more accurate      For BLADDER  Avoid soda and artificial sweeteners for bladder issues. Try not to hold the urine for more than 4 hours except during sleep. Stop and taking a deep breathe. Focus on deep breathing while walking to bathroom or take the number 100 and subtract 3 or 7 from each of your results all the way down until you are sitting on toilet. Patient Education        Kegel Exercises: Care Instructions  Overview  Kegel exercises strengthen muscles around the bladder. These muscles control the flow of urine. Kegel exercises are sometime called \"pelvic floor\" exercises. They can help prevent urine leakage and keep the pelvic organs in place. Kegel exercises can strengthen pelvic muscles that have been weakened by age, pregnancy, childbirth, and surgery. They may help prevent or treat urine leakage. You do Kegel exercises by tightening the muscles you use when you urinate. You will likely need to do these exercises for several weeks to get better. Follow-up care is a key part of your treatment and safety. Be sure to make and go to all appointments, and call your doctor if you are having problems. It's also a good idea to know your test results and keep a list of the medicines you take. How can you care for yourself at home? · Do Kegel exercises. ? Find the muscles you need to strengthen. To do this, tighten the muscles that stop your urine while you are going to the bathroom. These are the same muscles you squeeze during Kegel exercises. ? Squeeze the muscles as hard as you can. Your belly and thighs should not move. ? Hold the squeeze for 3 seconds. Then relax for 3 seconds. ? Start with 3 seconds, and then add 1 second each week until you are able to squeeze for 10 seconds. ? Repeat the exercise 10 to 15 times for each session.  Do three or more sessions each day. · You can check to see if you are using the right muscles. ? Tighten the muscles that help you stop passing gas or keep you from urinating. Make sure you aren't using your stomach, leg, or buttock muscles. ? Place a finger in your vagina and squeeze around it. You are doing them right when you feel pressure around your finger. Your doctor may also suggest that you put special weights in your vagina while you do the exercises. · Check with your doctor if you don't notice a difference after trying these exercises for several weeks. Your doctor may suggest getting help from a physical therapist or recommend other treatment. Where can you learn more? Go to https://TelanetixpeCava Grilleweb.[employer]. org and sign in to your [facility] account. Enter E791 in the [employer] box to learn more about \"Kegel Exercises: Care Instructions. \"     If you do not have an account, please click on the \"Sign Up Now\" link. Current as of: February 10, 2021               Content Version: 13.1  © 2006-2021 [employer]. Care instructions adapted under license by [facility] ([facility]). If you have questions about a medical condition or this instruction, always ask your healthcare professional. [place] [address] any warranty or liability for your use of this information. No quick diets:  losing weight is a marathon and not a sprint. Eat clean or healthy 5-6 days per week and splurge 1-2 days a week. Minimum servings 3 veggies and 2 fruits per day. Try to eat a rainbow of colors over several weeks. Limit or avoid sugar. Don't drink your calories unless they are nutritious  (juice also has a lot of sugar so limit it too)  Get enough sleep or rest  --- most adults need at least 7 hours. Try to move more. Try not to obsess about your weight. It just causes more stress.   If hungry between meals, drink water first.  Best overall diets are The DASH diet or the Mediterranean diet.

## 2022-01-21 LAB
ESTIMATED AVERAGE GLUCOSE: 119.8 MG/DL
HBA1C MFR BLD: 5.8 %

## 2022-01-22 ENCOUNTER — PATIENT MESSAGE (OUTPATIENT)
Dept: INTERNAL MEDICINE CLINIC | Age: 57
End: 2022-01-22

## 2022-01-24 NOTE — TELEPHONE ENCOUNTER
From: Leonard Plaza  To: Dr. Giselle Quan: 1/22/2022 10:27 AM EST  Subject: Estradiol    Forgot to ask at my appointment, I know we are working on decreasing the amount of Estradiol to ween me off of it. Currently I am taking one 1MG tablet a day. Should I keep it the same or do you recommend decreasing?

## 2022-02-06 DIAGNOSIS — Z79.890 HORMONE REPLACEMENT THERAPY: ICD-10-CM

## 2022-02-06 DIAGNOSIS — E03.9 ACQUIRED HYPOTHYROIDISM: ICD-10-CM

## 2022-02-07 RX ORDER — ESTRADIOL 1 MG/1
TABLET ORAL
Qty: 135 TABLET | Refills: 2 | Status: SHIPPED | OUTPATIENT
Start: 2022-02-07

## 2022-02-07 RX ORDER — LEVOTHYROXINE SODIUM 112 UG/1
112 TABLET ORAL DAILY
Qty: 30 TABLET | Refills: 0 | Status: SHIPPED | OUTPATIENT
Start: 2022-02-07 | End: 2022-02-08 | Stop reason: SDUPTHER

## 2022-02-08 ENCOUNTER — TELEPHONE (OUTPATIENT)
Dept: INTERNAL MEDICINE CLINIC | Age: 57
End: 2022-02-08

## 2022-02-08 DIAGNOSIS — E03.9 ACQUIRED HYPOTHYROIDISM: ICD-10-CM

## 2022-02-08 RX ORDER — LEVOTHYROXINE SODIUM 112 UG/1
112 TABLET ORAL DAILY
Qty: 90 TABLET | Refills: 2 | Status: SHIPPED | OUTPATIENT
Start: 2022-02-08

## 2022-02-08 NOTE — TELEPHONE ENCOUNTER
Patient is requesting the refill for levothyroxine (SYNTHROID) 112 MCG tablet sent to [facility] yesterday be changed to a 90 day with refills.

## 2022-02-18 ENCOUNTER — PATIENT MESSAGE (OUTPATIENT)
Dept: INTERNAL MEDICINE CLINIC | Age: 57
End: 2022-02-18

## 2022-02-18 NOTE — TELEPHONE ENCOUNTER
From: Roya Sung  To: Dr. Maryann Valenzuela: 2/18/2022 12:07 PM EST  Subject: Cologuard result    Hello,    I was just checking, I sent the cologuard sample back and received a letter from them that the results were sent to my doctor. I do not see the result on my chart so I wanted to check about it.     Thank you ,  Michael Espinoza

## 2022-02-19 ENCOUNTER — HOSPITAL ENCOUNTER (OUTPATIENT)
Dept: WOMENS IMAGING | Age: 57
Discharge: HOME | End: 2022-02-19
Payer: COMMERCIAL

## 2022-02-19 VITALS — WEIGHT: 215 LBS | HEIGHT: 62 IN | BODY MASS INDEX: 39.56 KG/M2

## 2022-02-19 DIAGNOSIS — Z12.31 ENCOUNTER FOR SCREENING MAMMOGRAM FOR BREAST CANCER: ICD-10-CM

## 2022-02-19 PROCEDURE — 77067 SCR MAMMO BI INCL CAD: CPT

## 2022-03-24 DIAGNOSIS — Z23 NEED FOR SHINGLES VACCINE: Primary | ICD-10-CM

## 2022-03-24 PROCEDURE — 90471 IMMUNIZATION ADMIN: CPT | Performed by: FAMILY MEDICINE

## 2022-03-24 PROCEDURE — 90750 HZV VACC RECOMBINANT IM: CPT | Performed by: FAMILY MEDICINE

## 2022-08-29 ENCOUNTER — OFFICE VISIT (OUTPATIENT)
Dept: INTERNAL MEDICINE CLINIC | Age: 57
End: 2022-08-29
Payer: COMMERCIAL

## 2022-08-29 VITALS
SYSTOLIC BLOOD PRESSURE: 122 MMHG | DIASTOLIC BLOOD PRESSURE: 82 MMHG | WEIGHT: 212 LBS | BODY MASS INDEX: 39.01 KG/M2 | HEIGHT: 62 IN | HEART RATE: 68 BPM

## 2022-08-29 DIAGNOSIS — R31.0 GROSS HEMATURIA: Primary | ICD-10-CM

## 2022-08-29 DIAGNOSIS — K21.9 GASTROESOPHAGEAL REFLUX DISEASE, UNSPECIFIED WHETHER ESOPHAGITIS PRESENT: ICD-10-CM

## 2022-08-29 DIAGNOSIS — R19.8 IRREGULAR BOWEL HABITS: ICD-10-CM

## 2022-08-29 DIAGNOSIS — R10.2 SUPRAPUBIC PRESSURE: ICD-10-CM

## 2022-08-29 LAB
BILIRUBIN, POC: NORMAL
BLOOD URINE, POC: NORMAL
CLARITY, POC: CLEAR
COLOR, POC: YELLOW
GLUCOSE URINE, POC: NORMAL
KETONES, POC: NORMAL
LEUKOCYTE EST, POC: NORMAL
NITRITE, POC: NORMAL
PH, POC: 6
PROTEIN, POC: NORMAL
SPECIFIC GRAVITY, POC: 1.01
UROBILINOGEN, POC: 0.2

## 2022-08-29 PROCEDURE — 99213 OFFICE O/P EST LOW 20 MIN: CPT | Performed by: FAMILY MEDICINE

## 2022-08-29 PROCEDURE — 81002 URINALYSIS NONAUTO W/O SCOPE: CPT | Performed by: FAMILY MEDICINE

## 2022-08-29 RX ORDER — SENNA PLUS 8.6 MG/1
1 TABLET ORAL 2 TIMES DAILY
Qty: 60 TABLET | COMMUNITY
Start: 2022-08-29 | End: 2023-08-29

## 2022-08-29 RX ORDER — ESOMEPRAZOLE MAGNESIUM 40 MG/1
CAPSULE, DELAYED RELEASE ORAL
Qty: 90 CAPSULE | OUTPATIENT
Start: 2022-08-29

## 2022-08-29 RX ORDER — ESOMEPRAZOLE MAGNESIUM 40 MG/1
CAPSULE, DELAYED RELEASE ORAL
Qty: 30 CAPSULE | Refills: 1 | Status: SHIPPED | OUTPATIENT
Start: 2022-08-29 | End: 2022-11-02

## 2022-08-29 ASSESSMENT — PATIENT HEALTH QUESTIONNAIRE - PHQ9: DEPRESSION UNABLE TO ASSESS: URGENT/EMERGENT SITUATION

## 2022-08-29 NOTE — PROGRESS NOTES
2022    Gege Bermudez (:  1965) is a 55 y.o. female, here for evaluation of the following chief complaint(s):  Hematuria (Started off and on for the last two weeks. /) and Gastroesophageal Reflux (Acid reflux and heartburn is so bad she is vomiting. )        ASSESSMENT/PLAN:    1. Gross hematuria  Urine is clear right now. She is sure it is pretty sure not vaginal.  Will watch. - POCT Urinalysis no Micro  - Culture, Urine    2. Gastroesophageal reflux disease, unspecified whether esophagitis present  May be worse due to bowel issues. If transverse colon distended with stool or gas, it can push up on stomach. Discussed trial of bowel emptying and add PPI. Use PPI for the shortest period of time necessary.   - esomeprazole (NEXIUM) 40 MG delayed release capsule; Take daily before breakfast until GI symptoms improved and then stop if able. If on daily for more than 10 days, wean off. Dispense: 30 capsule; Refill: 1    3. Suprapubic pressure  Most likely due to stool back up. 4. Irregular bowel habits  Discussed using Senna for a few days and then  maybe once or twice a week but not daily. Had negative Cologuard earlier this year. If not better, consider colonoscopy and EGD for symptoms. - senna (SENOKOT) 8.6 MG tablet; Take 1 tablet by mouth 2 times daily  Dispense: 60 tablet; Refill: PRN        FOLLOW UP:  Call or return to clinic prn if these symptoms worsen or fail to improve as anticipated. HPI   2 weeks ago, she felt a little irritated and little lower abd pressure. Blood in the urine and then it stopped,  it started again this weekend. Feels a little irritation. The pressure in lower abdomen on and off. Urinalysis today is clear. Normally she goes 2-4 hours for voiding. This AM did not go urinate until she came here after the first morning void. She drinks tea. Last 6 months. Every 2 months she has severe acid reflux that she will vomit.   She increased famotidine to bid and most of the time it helps. She is miserable for days. Saturday 2 days ago, she tried to vomit and nothing came up. Yesterday she felt bad. Ate very bad. Feels bloated. BMs had been normal but this weekend \"like diarrhea\" it was more watery. Today was more mushy and almost back to normal.   Taking metamucil and notices her stools are softer using it. Outpatient Medications Marked as Taking for the 8/29/22 encounter (Office Visit) with Xochitl Heredia MD   Medication Sig Dispense Refill    levothyroxine (SYNTHROID) 112 MCG tablet Take 1 tablet by mouth daily 90 tablet 2    estradiol (ESTRACE) 1 MG tablet TAKE 2 TABLETS BY MOUTH ON EVEN DAYS OF MONTH AND 1 TABLET ON ODD DAYS OF THE MONTH 135 tablet 2    famotidine (PEPCID) 20 MG tablet Take 20 mg by mouth 2 times daily as needed      Triamcinolone Acetonide (NASACORT ALLERGY 24HR) 55 MCG/ACT AERO 1 spray by Nasal route daily. Use in each nostril 1 Bottle 11       Review of Systems    OBJECTIVE:    Vitals:    08/29/22 1049   BP: 122/82   Pulse: 68   Weight: 212 lb (96.2 kg)   Height: 5' 2\" (1.575 m)       Physical Exam  Vitals reviewed. Constitutional:       General: She is not in acute distress. Appearance: She is well-developed. She is not diaphoretic. Cardiovascular:      Rate and Rhythm: Normal rate and regular rhythm. Heart sounds: Normal heart sounds. Pulmonary:      Effort: Pulmonary effort is normal.      Breath sounds: Normal breath sounds. No rales. Abdominal:      General: Bowel sounds are normal. There is no distension or abdominal bruit. Palpations: Abdomen is soft. There is no mass. Tenderness: There is abdominal tenderness in the suprapubic area and left lower quadrant. There is no right CVA tenderness, left CVA tenderness, guarding or rebound. Hernia: No hernia is present. Comments: Feels full over left colon   Genitourinary:     Rectum: Guaiac result negative.  No mass, tenderness or external hemorrhoid. Comments: Rectum feels empty but there is smear of mushy yellowish brown stool on exam finger. (She defecated this AM)  Skin:     General: Skin is warm and dry. Coloration: Skin is not pale. Findings: No rash. Psychiatric:         Behavior: Behavior normal.         An electronic signature was used to authenticate this note.     Sky Dle Rio MD

## 2022-08-29 NOTE — PATIENT INSTRUCTIONS
Plain Senna but if unable to find, OK to take Senna-S. (Generic of Senokot). Take 1 tablet daily for several days until you no longer feel bloated. Once that seems to be better, you can use it once or twice a week if you feel you need it to stay regular. We do not want this daily for long periods of time.

## 2022-08-30 ENCOUNTER — PATIENT MESSAGE (OUTPATIENT)
Dept: INTERNAL MEDICINE CLINIC | Age: 57
End: 2022-08-30

## 2022-08-31 LAB — URINE CULTURE, ROUTINE: NORMAL

## 2022-08-31 NOTE — TELEPHONE ENCOUNTER
From: [facility]  To: Charisse Jenkins  Sent: 8/30/2022 9:53 AM EDT  Subject: Your health. [address] [place] [address] INTERNAL MEDICINE  [address] [facility] [address]  Dept: [phone]  Dept Fax: [phone]: [address], [place] 8/30/2022      [address] [place] [address]      Dear Adán Later records show that you are due or overdue for a colon cancer screening. Colorectal cancer is the second leading cause of cancer-related death in the [place]. The good news is that this disease can be prevented. Colon Cancer screening can detect precancerous polyps that can be removed with easy procedures. The [place] Preventative Services Task Force tells us that appropriately scheduled colon cancer screening reduces death from colorectal cancer, and strongly suggests screening for men and women ages 48 and older. I recommend that you elect one of the following two approved options:     1) A test that finds polyps and cancer - colonoscopy   2) A test that primarily finds cancer - yearly stool testing, with a colonoscopy for positive tests showing presence of blood in the bowels (FOBT or FIT)    The colonoscopy is preferable as it is able to identify and treat both pre-cancerous polyps and early forms of colon cancer. Please call our office so we can assist you in scheduling your colonoscopy. We look forward to hearing from you. If you already have had this done, we praise your attention to your health. Please call our office so we can update your records. Please make sure to let us know who performed your test and where it was done. We also welcome you to reach out to us online using [employer]. Ask us how!     Sincerely,    Abilio Guaman MD    www.cdc.gov

## 2022-11-02 DIAGNOSIS — K21.9 GASTROESOPHAGEAL REFLUX DISEASE, UNSPECIFIED WHETHER ESOPHAGITIS PRESENT: ICD-10-CM

## 2022-11-02 RX ORDER — ESOMEPRAZOLE MAGNESIUM 40 MG/1
CAPSULE, DELAYED RELEASE ORAL
Qty: 45 CAPSULE | OUTPATIENT
Start: 2022-11-02

## 2022-11-02 RX ORDER — ESOMEPRAZOLE MAGNESIUM 40 MG/1
CAPSULE, DELAYED RELEASE ORAL
Qty: 30 CAPSULE | Refills: 1 | Status: SHIPPED | OUTPATIENT
Start: 2022-11-02

## 2022-12-04 DIAGNOSIS — E03.9 ACQUIRED HYPOTHYROIDISM: ICD-10-CM

## 2022-12-05 RX ORDER — LEVOTHYROXINE SODIUM 112 UG/1
112 TABLET ORAL DAILY
Qty: 90 TABLET | Refills: 2 | Status: SHIPPED | OUTPATIENT
Start: 2022-12-05

## 2023-01-26 ENCOUNTER — OFFICE VISIT (OUTPATIENT)
Dept: INTERNAL MEDICINE CLINIC | Age: 58
End: 2023-01-26
Payer: COMMERCIAL

## 2023-01-26 VITALS
SYSTOLIC BLOOD PRESSURE: 128 MMHG | HEIGHT: 62 IN | OXYGEN SATURATION: 97 % | HEART RATE: 75 BPM | DIASTOLIC BLOOD PRESSURE: 72 MMHG | WEIGHT: 220 LBS | BODY MASS INDEX: 40.48 KG/M2

## 2023-01-26 DIAGNOSIS — Z00.00 ANNUAL PHYSICAL EXAM: Primary | ICD-10-CM

## 2023-01-26 DIAGNOSIS — E66.01 OBESITY, MORBID (HCC): ICD-10-CM

## 2023-01-26 DIAGNOSIS — R53.82 CHRONIC FATIGUE: ICD-10-CM

## 2023-01-26 DIAGNOSIS — Z23 NEED FOR VACCINATION: ICD-10-CM

## 2023-01-26 DIAGNOSIS — E55.9 VITAMIN D DEFICIENCY: ICD-10-CM

## 2023-01-26 DIAGNOSIS — Z79.890 ENCOUNTER FOR MONITORING POSTMENOPAUSAL ESTROGEN REPLACEMENT THERAPY: ICD-10-CM

## 2023-01-26 DIAGNOSIS — R23.2 HOT FLASHES: ICD-10-CM

## 2023-01-26 DIAGNOSIS — Z51.81 ENCOUNTER FOR MONITORING POSTMENOPAUSAL ESTROGEN REPLACEMENT THERAPY: ICD-10-CM

## 2023-01-26 DIAGNOSIS — R73.02 IMPAIRED GLUCOSE TOLERANCE: ICD-10-CM

## 2023-01-26 LAB
A/G RATIO: 1.6 (ref 1.1–2.2)
ALBUMIN SERPL-MCNC: 4.6 G/DL (ref 3.4–5)
ALP BLD-CCNC: 91 U/L (ref 40–129)
ALT SERPL-CCNC: 49 U/L (ref 10–40)
ANION GAP SERPL CALCULATED.3IONS-SCNC: 10 MMOL/L (ref 3–16)
AST SERPL-CCNC: 39 U/L (ref 15–37)
BASOPHILS ABSOLUTE: 0.1 K/UL (ref 0–0.2)
BASOPHILS RELATIVE PERCENT: 1.5 %
BILIRUB SERPL-MCNC: 0.3 MG/DL (ref 0–1)
BUN BLDV-MCNC: 10 MG/DL (ref 7–20)
CALCIUM SERPL-MCNC: 10.6 MG/DL (ref 8.3–10.6)
CHLORIDE BLD-SCNC: 104 MMOL/L (ref 99–110)
CHOLESTEROL, FASTING: 197 MG/DL (ref 0–199)
CO2: 29 MMOL/L (ref 21–32)
CREAT SERPL-MCNC: 0.8 MG/DL (ref 0.6–1.1)
EOSINOPHILS ABSOLUTE: 0.2 K/UL (ref 0–0.6)
EOSINOPHILS RELATIVE PERCENT: 2.9 %
GFR SERPL CREATININE-BSD FRML MDRD: >60 ML/MIN/{1.73_M2}
GLUCOSE BLD-MCNC: 114 MG/DL (ref 70–99)
HCT VFR BLD CALC: 47.6 % (ref 36–48)
HDLC SERPL-MCNC: 54 MG/DL (ref 40–60)
HEMOGLOBIN: 15.5 G/DL (ref 12–16)
LDL CHOLESTEROL CALCULATED: 113 MG/DL
LYMPHOCYTES ABSOLUTE: 2.1 K/UL (ref 1–5.1)
LYMPHOCYTES RELATIVE PERCENT: 29.9 %
MCH RBC QN AUTO: 31.3 PG (ref 26–34)
MCHC RBC AUTO-ENTMCNC: 32.6 G/DL (ref 31–36)
MCV RBC AUTO: 96 FL (ref 80–100)
MONOCYTES ABSOLUTE: 0.7 K/UL (ref 0–1.3)
MONOCYTES RELATIVE PERCENT: 9.3 %
NEUTROPHILS ABSOLUTE: 4 K/UL (ref 1.7–7.7)
NEUTROPHILS RELATIVE PERCENT: 56.4 %
PDW BLD-RTO: 13.9 % (ref 12.4–15.4)
PLATELET # BLD: 310 K/UL (ref 135–450)
PMV BLD AUTO: 11.2 FL (ref 5–10.5)
POTASSIUM SERPL-SCNC: 5.2 MMOL/L (ref 3.5–5.1)
RBC # BLD: 4.96 M/UL (ref 4–5.2)
SODIUM BLD-SCNC: 143 MMOL/L (ref 136–145)
TOTAL PROTEIN: 7.4 G/DL (ref 6.4–8.2)
TRIGLYCERIDE, FASTING: 150 MG/DL (ref 0–150)
TSH REFLEX: 0.32 UIU/ML (ref 0.27–4.2)
VITAMIN B-12: 818 PG/ML (ref 211–911)
VITAMIN D 25-HYDROXY: 61.3 NG/ML
VLDLC SERPL CALC-MCNC: 30 MG/DL
WBC # BLD: 7.1 K/UL (ref 4–11)

## 2023-01-26 PROCEDURE — 90471 IMMUNIZATION ADMIN: CPT | Performed by: FAMILY MEDICINE

## 2023-01-26 PROCEDURE — 90715 TDAP VACCINE 7 YRS/> IM: CPT | Performed by: FAMILY MEDICINE

## 2023-01-26 PROCEDURE — 99396 PREV VISIT EST AGE 40-64: CPT | Performed by: FAMILY MEDICINE

## 2023-01-26 RX ORDER — ESTRADIOL 0.5 MG/1
0.5 TABLET ORAL DAILY
Qty: 90 TABLET | Refills: 3 | Status: SHIPPED | OUTPATIENT
Start: 2023-01-26

## 2023-01-26 SDOH — HEALTH STABILITY: PHYSICAL HEALTH: ON AVERAGE, HOW MANY MINUTES DO YOU ENGAGE IN EXERCISE AT THIS LEVEL?: 20 MIN

## 2023-01-26 SDOH — ECONOMIC STABILITY: FOOD INSECURITY: WITHIN THE PAST 12 MONTHS, YOU WORRIED THAT YOUR FOOD WOULD RUN OUT BEFORE YOU GOT MONEY TO BUY MORE.: NEVER TRUE

## 2023-01-26 SDOH — ECONOMIC STABILITY: FOOD INSECURITY: WITHIN THE PAST 12 MONTHS, THE FOOD YOU BOUGHT JUST DIDN'T LAST AND YOU DIDN'T HAVE MONEY TO GET MORE.: NEVER TRUE

## 2023-01-26 SDOH — HEALTH STABILITY: PHYSICAL HEALTH: ON AVERAGE, HOW MANY DAYS PER WEEK DO YOU ENGAGE IN MODERATE TO STRENUOUS EXERCISE (LIKE A BRISK WALK)?: 2 DAYS

## 2023-01-26 ASSESSMENT — PATIENT HEALTH QUESTIONNAIRE - PHQ9
SUM OF ALL RESPONSES TO PHQ QUESTIONS 1-9: 0
1. LITTLE INTEREST OR PLEASURE IN DOING THINGS: 0
SUM OF ALL RESPONSES TO PHQ QUESTIONS 1-9: 0
SUM OF ALL RESPONSES TO PHQ QUESTIONS 1-9: 0
2. FEELING DOWN, DEPRESSED OR HOPELESS: 0
SUM OF ALL RESPONSES TO PHQ9 QUESTIONS 1 & 2: 0
SUM OF ALL RESPONSES TO PHQ QUESTIONS 1-9: 0

## 2023-01-26 ASSESSMENT — SOCIAL DETERMINANTS OF HEALTH (SDOH): HOW HARD IS IT FOR YOU TO PAY FOR THE VERY BASICS LIKE FOOD, HOUSING, MEDICAL CARE, AND HEATING?: NOT HARD AT ALL

## 2023-01-26 NOTE — PROGRESS NOTES
Chief Complaint   Patient presents with    Annual Exam    Other     Hot flashes          PREVENTATIVE VISIT AND EXAM      Doing OK but having a lot of hot flashes including night time that interrupts her sleep. Went from 1 mg to 0.5 mg to quickly off the 0.5 mg and has been off for several months. Patient Active Problem List   Diagnosis    Allergic rhinitis    Asthma    Hypothyroidism    Hormone replacement therapy    Severe obesity (BMI >= 40) (HCC)    Benign neoplasm of brain, unspecified ([facility] [address]    Pancreatic cyst    Obesity, Class II, BMI 35-39.9    GERD (gastroesophageal reflux disease)       Past Medical History:   Diagnosis Date    Allergic rhinitis     see problem list for specific +s. Asthma     has cats in her home    Hypothyroidism     Pelvic relaxation     Urine, incontinence, stress female 2012    Vasomotor rhinitis      Past Surgical History:   Procedure Laterality Date    CHOLECYSTECTOMY      ENDOMETRIAL ABLATION      HYSTERECTOMY, VAGINAL  2014    INCONTINENCE SURGERY  2014    Dr. Shadi Godfrey  2004     Family History   Problem Relation Age of Onset    Other Son         probable mild anxiety --- more from his paternal side    Heart Disease Mother     Hypertension Mother     Other Mother         GERD/IBS    Osteoarthritis Mother     Stroke Mother         due to uncontrolled HTN    Memory Loss Mother         may have been HTN-cinthia encephalopathy    Hypertension Father      Social History     Tobacco Use    Smoking status: Former     Packs/day: 1.00     Years: 10.00     Pack years: 10.00     Types: Cigarettes     Start date: 1988     Quit date: 1998     Years since quittin.0    Smokeless tobacco: Never   Vaping Use    Vaping Use: Never used   Substance Use Topics    Alcohol use: Yes     Alcohol/week: 0.0 - 1.7 standard drinks     Comment: maybe a little more in summer.     Drug use: No       Outpatient Medications Marked as Taking for the 23 encounter (Office Visit) with Radha Carrillo MD   Medication Sig Dispense Refill    levothyroxine (SYNTHROID) 112 MCG tablet TAKE 1 TABLET BY MOUTH DAILY 90 tablet 2    esomeprazole (NEXIUM) 40 MG delayed release capsule TAKE ONE CAPSULE BY MOUTH DAILY OR EVERY OTHER DAY BEFORE BREAKFAST 30 capsule 1    famotidine (PEPCID) 20 MG tablet Take 20 mg by mouth 2 times daily as needed      Triamcinolone Acetonide (NASACORT ALLERGY 24HR) 55 MCG/ACT AERO 1 spray by Nasal route daily. Use in each nostril 1 Bottle 11         ROS:  Full 12 system review done and all negative except for the following:   Hot flashes. Physical Exam   Vitals:    01/26/23 0817   BP: 128/72   Pulse: 75   SpO2: 97%      Body mass index is 40.24 kg/m². Wt Readings from Last 3 Encounters:   01/26/23 220 lb (99.8 kg)   08/29/22 212 lb (96.2 kg)   02/19/22 215 lb (97.5 kg)         Constitutional: Oriented to person, place, and time. Appears well-developed and well-nourished. No distress. HENT:   Head: Normocephalic and atraumatic. Ears: Hearing, tympanic membrane, external ear and ear canal normal.   Nose: Nose normal.   Mouth/Throat: Uvula is midline, oropharynx is clear and moist and mucous membranes are normal.   Eyes: Conjunctivae and EOM are normal. Pupils are equal.. No scleral icterus. Neck: Normal range of motion. Neck supple. Normal carotid pulses and no JVD present. Carotid bruit is not present. No tracheal deviation present. No mass and no thyromegaly present. Cardiovascular: Normal rate, regular rhythm, S1 normal, S2 normal, normal heart sounds and intact distal pulses. No murmur heard. Pulmonary/Chest: Effort normal and breath sounds normal. No stridor. No respiratory distress. No decreased breath sounds. No wheezes. No rhonchi. No rales. Abdominal: Soft. Normal appearance and bowel sounds are normal. No distension, no abdominal bruit and no mass. There is no hepatomegaly. No tenderness. Musculoskeletal: No edema.    No deformity. Lymphadenopathy:     No cervical adenopathy. No axillary adenopathy. No supraclavicular adenopathy. No Inguinal adenopathy. Neurological: Alert and oriented to person, place, and time. No tremor. No cranial nerve deficit. Normal muscle tone. Coordination and gait normal.   Skin: Skin is warm and dry. No rash noted. Not diaphoretic. No cyanosis. No pallor. Nails show no clubbing. Psychiatric:  Normal mood and affect. Speech is normal and behavior is normal. Cognition and memory are normal.       The 10-year ASCVD risk score (Jakob ARTEAGA, et al., 2019) is: 2.5%    Values used to calculate the score:      Age: 65 years      Sex: Female      Is Non- : No      Diabetic: No      Tobacco smoker: No      Systolic Blood Pressure: 439 mmHg      Is BP treated: No      HDL Cholesterol: 47 mg/dL      Total Cholesterol: 195 mg/dL      Assessment:      1. Annual physical exam  Update and discussed healthy habits and testing.   - Comprehensive Metabolic Panel  - Hemoglobin A1C  - Lipid, Fasting  - CBC with Auto Differential  - TSH with Reflex  - Vitamin B12  - Vitamin D 25 Hydroxy    2. Hot flashes  Resume estrogen but lower the dose and then try to wean really slowly   - estradiol (ESTRACE) 0.5 MG tablet; Take 1 tablet by mouth daily  Dispense: 90 tablet; Refill: 3  - CBC with Auto Differential  - TSH with Reflex    3. Impaired glucose tolerance  Encouraged to work on healthy diet and lose 10% of her current weigth. - Hemoglobin A1C    4. Vitamin D deficiency  - Vitamin D 25 Hydroxy    5. Chronic fatigue  May need to supplement with vit D or B12 if low and r/o other causes. - Comprehensive Metabolic Panel  - TSH with Reflex  - Vitamin B12    6. Need for vaccination  - Tdap, BOOSTRIX, (age 8 yrs+), IM    7. Obesity, morbid ([address]  Suggested testing for RAMONITA. She says she cannot afford to do this right now. Consider a FitBit that monitors sleep and oxygen sat.       8. Encounter for monitoring postmenopausal estrogen replacement therapy  Will resume ERT but at lower dose. Health Maintenance Due   Topic Date Due    Depression Screen  04/08/2022    DTaP/Tdap/Td vaccine (3 - Td or Tdap) 07/02/2022    A1C test (Diabetic or Prediabetic)  01/20/2023         Plan:    See orders and patient instructions. Age-appropriate preventative issues discussed and hand out given. Follow up:  Return in about 1 year (around 1/26/2024).   But return sooner if not doing well

## 2023-01-26 NOTE — PATIENT INSTRUCTIONS
Vitamin D 2000 units = 50 mcg daily. Estradiol:  Take a full tablet at least 1 month and hopefully the hot flashes will improve. When you are full comfortable, take 1/2 tablet one day a week for 2 weeks to 1 month. If that is good, then take 1/2 tablet twice a week spread apart. Of course take a full tablet on the other days. THE TRUTH ABOUT FAT on [employer] program.  No quick diets:  losing weight is a marathon and not a sprint. Eat clean or healthy 5-6 days per week and splurge 1-2 days a week. Minimum servings 3 veggies and 2 fruits per day. Try to eat a rainbow of colors over several weeks. Limit or avoid sugar. Don't drink your calories unless they are nutritious  (juice also has a lot of sugar so limit it too)  Get enough sleep or rest  --- most adults need at least 7 hours. Try to move more. Try not to obsess about your weight. It just causes more stress. If hungry between meals, drink water first.  Best overall diets are The DASH diet or the Mediterranean diet. Steroid nose sprays:  Flonase or Nasacort  Astepro is over the counter. Consider using at night time.     Consider Fitbit (Luxe or similar that monitors your sleep)

## 2023-01-27 LAB
ESTIMATED AVERAGE GLUCOSE: 125.5 MG/DL
HBA1C MFR BLD: 6 %

## 2023-01-29 NOTE — RESULT ENCOUNTER NOTE
All blood tests are normal or at goal except for sugar is creeping up and is a sign of developing diabetes unless you are able to get weight off by improved diet and increased activity. Your liver tests are also going up and this is due to too much \"visceral\" fat. Visceral fat is the type of fat inside the abdominal cavity and it can cause medical issues. If there is anything you think I can help you to improve your diet or exercise, please let me know.

## 2023-03-11 ENCOUNTER — HOSPITAL ENCOUNTER (OUTPATIENT)
Dept: WOMENS IMAGING | Age: 58
Discharge: HOME | End: 2023-03-11
Payer: COMMERCIAL

## 2023-03-11 DIAGNOSIS — Z12.31 ENCOUNTER FOR SCREENING MAMMOGRAM FOR BREAST CANCER: ICD-10-CM

## 2023-03-11 PROCEDURE — 77063 BREAST TOMOSYNTHESIS BI: CPT

## 2023-03-26 DIAGNOSIS — K21.9 GASTROESOPHAGEAL REFLUX DISEASE, UNSPECIFIED WHETHER ESOPHAGITIS PRESENT: ICD-10-CM

## 2023-03-27 RX ORDER — ESOMEPRAZOLE MAGNESIUM 40 MG/1
CAPSULE, DELAYED RELEASE ORAL
Qty: 30 CAPSULE | Refills: 1 | Status: SHIPPED | OUTPATIENT
Start: 2023-03-27

## 2023-06-01 ENCOUNTER — OFFICE VISIT (OUTPATIENT)
Dept: INTERNAL MEDICINE CLINIC | Age: 58
End: 2023-06-01
Payer: COMMERCIAL

## 2023-06-01 VITALS
DIASTOLIC BLOOD PRESSURE: 72 MMHG | WEIGHT: 224.4 LBS | SYSTOLIC BLOOD PRESSURE: 120 MMHG | HEART RATE: 70 BPM | OXYGEN SATURATION: 97 % | BODY MASS INDEX: 41.3 KG/M2 | HEIGHT: 62 IN

## 2023-06-01 DIAGNOSIS — Z82.49 FAMILY HISTORY OF PREMATURE CORONARY ARTERY DISEASE: ICD-10-CM

## 2023-06-01 DIAGNOSIS — R20.0 BILATERAL HAND NUMBNESS: ICD-10-CM

## 2023-06-01 DIAGNOSIS — M79.641 BILATERAL HAND PAIN: Primary | ICD-10-CM

## 2023-06-01 DIAGNOSIS — M79.642 BILATERAL HAND PAIN: Primary | ICD-10-CM

## 2023-06-01 PROCEDURE — 99213 OFFICE O/P EST LOW 20 MIN: CPT | Performed by: FAMILY MEDICINE

## 2023-06-01 RX ORDER — CRANBERRY FRUIT EXTRACT 200 MG
CAPSULE ORAL
COMMUNITY

## 2023-06-01 RX ORDER — MULTIVITAMIN WITH IRON
1 TABLET ORAL DAILY
COMMUNITY

## 2023-06-01 RX ORDER — MULTIVITAMIN WITH IRON
250 TABLET ORAL DAILY
COMMUNITY

## 2023-06-01 SDOH — ECONOMIC STABILITY: HOUSING INSECURITY
IN THE LAST 12 MONTHS, WAS THERE A TIME WHEN YOU DID NOT HAVE A STEADY PLACE TO SLEEP OR SLEPT IN A SHELTER (INCLUDING NOW)?: NO

## 2023-06-01 SDOH — ECONOMIC STABILITY: FOOD INSECURITY: WITHIN THE PAST 12 MONTHS, THE FOOD YOU BOUGHT JUST DIDN'T LAST AND YOU DIDN'T HAVE MONEY TO GET MORE.: NEVER TRUE

## 2023-06-01 SDOH — ECONOMIC STABILITY: INCOME INSECURITY: HOW HARD IS IT FOR YOU TO PAY FOR THE VERY BASICS LIKE FOOD, HOUSING, MEDICAL CARE, AND HEATING?: NOT HARD AT ALL

## 2023-06-01 SDOH — ECONOMIC STABILITY: FOOD INSECURITY: WITHIN THE PAST 12 MONTHS, YOU WORRIED THAT YOUR FOOD WOULD RUN OUT BEFORE YOU GOT MONEY TO BUY MORE.: NEVER TRUE

## 2023-06-01 NOTE — PROGRESS NOTES
2023    Ilia Baig (:  1965) is a 65 y.o. female, here for evaluation of the following chief complaint(s):  Pain (Bilateral hands for a few months) and Numbness (Bilateral hands)        ASSESSMENT/PLAN:    1. Bilateral hand pain  - Roy Cogan, MD (Inpatient and Outpatient EMG), [facility]    2. Bilateral hand numbness  - Roy Cogan, MD (Inpatient and Outpatient EMG), [facility]    Carpal tunnel syndrome. Elevated arm hand flapping  with shoulders at 90 degrees and forearm and hands above this, did not cause the numbness as I would expect if it was thoracic outlet syndrome. 3. Family history of premature coronary artery disease  She was concerned that her hand numbness was a sign of atypical cardiac angina. We discussed this would be very unlikely with her symptoms. She is concerned because her mother had heart problems diagnosed in her 62s. We discussed potentially getting a CT cardiac calcium score, but this is a self-pay test and is not necessarily recommended for everyone to have. She is not having chest pains. She did have a normal EKG 2 and half years ago. - CT CARDIAC CALCIUM SCORING; Future        FOLLOW UP:  Call or return to clinic prn if these symptoms worsen or fail to improve as anticipated. HPI  Hands are both aching. Started more right hand. A night time wrist splint did help a little bit but now not helping. The hand aches and it can go up her arm to her neck. Entire hand and shoulders ache. Hand will go numb with holding the hair dryer almost immediately    Had a bad bruise on her palm. It went away.         Outpatient Medications Marked as Taking for the 23 encounter (Office Visit) with Sherry Montgomery MD   Medication Sig Dispense Refill    esomeprazole (NEXIUM) 40 MG delayed release capsule TAKE 1 CAPSULE BY MOUTH DAILY EVERY OTHER DAY BEFORE BREAKFAST 30 capsule 1    estradiol (ESTRACE) 0.5 MG tablet Take 1 tablet

## 2023-06-22 ENCOUNTER — PATIENT MESSAGE (OUTPATIENT)
Dept: INTERNAL MEDICINE CLINIC | Age: 58
End: 2023-06-22

## 2023-06-22 DIAGNOSIS — G56.03 CARPAL TUNNEL SYNDROME, BILATERAL: ICD-10-CM

## 2023-06-22 DIAGNOSIS — G56.23 ULNAR NEUROPATHY OF BOTH UPPER EXTREMITIES: ICD-10-CM

## 2023-06-23 PROBLEM — G56.23 ULNAR NEUROPATHY OF BOTH UPPER EXTREMITIES: Status: ACTIVE | Noted: 2023-06-23

## 2023-06-23 PROBLEM — G56.03 CARPAL TUNNEL SYNDROME, BILATERAL: Status: ACTIVE | Noted: 2023-06-23

## 2023-06-23 NOTE — TELEPHONE ENCOUNTER
Austen Valle MA 6/23/2023 8:13 AM EDT      ----- Message -----  From: Ave Chase \"Josep\"  Sent: 6/22/2023 5:57 PM EDT  To: , *  Subject: Carpal Tunnel     Thank you, if you could refer a hand surgeon that would be at [facility].    Thank you, Navjot Keenan

## 2023-07-09 SDOH — HEALTH STABILITY: PHYSICAL HEALTH: ON AVERAGE, HOW MANY DAYS PER WEEK DO YOU ENGAGE IN MODERATE TO STRENUOUS EXERCISE (LIKE A BRISK WALK)?: 2 DAYS

## 2023-07-09 SDOH — HEALTH STABILITY: PHYSICAL HEALTH: ON AVERAGE, HOW MANY MINUTES DO YOU ENGAGE IN EXERCISE AT THIS LEVEL?: 20 MIN

## 2023-07-12 ENCOUNTER — OFFICE VISIT (OUTPATIENT)
Dept: ORTHOPEDIC SURGERY | Age: 58
End: 2023-07-12

## 2023-07-12 VITALS — RESPIRATION RATE: 16 BRPM | HEIGHT: 62 IN | BODY MASS INDEX: 41.22 KG/M2 | WEIGHT: 224 LBS

## 2023-07-12 DIAGNOSIS — G56.03 BILATERAL CARPAL TUNNEL SYNDROME: Primary | ICD-10-CM

## 2023-07-12 DIAGNOSIS — G56.20 CUBITAL TUNNEL SYNDROME, UNSPECIFIED LATERALITY: ICD-10-CM

## 2023-07-12 NOTE — PROGRESS NOTES
Ms. Sarah Sosa is a 65 y.o. right handed woman  who is seen today in Hand Surgical Consultation at the request of Claudia Chase MD.    She presents today regarding Bilateral symptoms which have been present for approximately 2 months. A history of antecedent trauma or injury is Absent. She reports symptoms to include moderate numbness & tingling in the Whole Hand. Neurologic symptoms do Frequently awaken her from sleep. She reports no pain located in the Medial both elbows, without retrograde and antegrade radiation . She reports no pain located in the palmar both wrists. Symptoms are worsening over time. Previous treatment has included conservative measures, activity modification, avoidance of bothersome tasks, and splinting of the right wrist.  She does claim relation of her symptoms to her required work activities. She has undergone electrodiagnostic testing. I have today reviewed with Sarah Sosa the clinically relevant, past medical history, medications, allergies,  family history, social history, and Review Of Systems & I have documented any details relevant to today's presenting complaints in my history above. Ms. Octavia Barba self-reported past medical history, medications, allergies,  family history, social history, and Review Of Systems have been scanned into the chart under the \"Media\" tab. Physical Exam:  Ms. Radha Barlow's most recent vitals:  Vitals  Respirations: 16  Height: 5' 2\" (157.5 cm)  Weight - Scale: 224 lb (101.6 kg)    She is well nourished, oriented to person, place & time. She demonstrates appropriate mood and affect as well as normal gait and station.     Skin: Normal in appearance, Normal Color, and Free of Lesions Bilaterally   Digital range of motion is Full and equal bilaterally   Wrist range of motion is Full and equal bilaterally   Elbow range of motion is full and equal bilateral   Sensation is subjectively tingling in the Whole

## 2023-07-13 ENCOUNTER — TELEPHONE (OUTPATIENT)
Dept: ORTHOPEDIC SURGERY | Age: 58
End: 2023-07-13

## 2023-07-19 ENCOUNTER — TELEPHONE (OUTPATIENT)
Dept: ORTHOPEDIC SURGERY | Age: 58
End: 2023-07-19

## 2023-07-20 NOTE — TELEPHONE ENCOUNTER
Auth: # [id number]    Date Range: 2023-07-19 - 2024-01-19  Type of SX:  OUTPATIENT  Location: [facility]  CPT: 49858 [phone]   DX Code: G56.22, G56.02  SX area: [facility]  Insurance: AN[employer]

## 2023-07-30 DIAGNOSIS — K21.9 GASTROESOPHAGEAL REFLUX DISEASE, UNSPECIFIED WHETHER ESOPHAGITIS PRESENT: ICD-10-CM

## 2023-07-31 RX ORDER — ESOMEPRAZOLE MAGNESIUM 40 MG/1
CAPSULE, DELAYED RELEASE ORAL
Qty: 30 CAPSULE | Refills: 3 | Status: SHIPPED | OUTPATIENT
Start: 2023-07-31

## 2023-08-01 ENCOUNTER — OFFICE VISIT (OUTPATIENT)
Dept: INTERNAL MEDICINE CLINIC | Age: 58
End: 2023-08-01
Payer: COMMERCIAL

## 2023-08-01 VITALS
SYSTOLIC BLOOD PRESSURE: 112 MMHG | TEMPERATURE: 97 F | DIASTOLIC BLOOD PRESSURE: 78 MMHG | OXYGEN SATURATION: 97 % | HEART RATE: 79 BPM | WEIGHT: 224 LBS | HEIGHT: 62 IN | BODY MASS INDEX: 41.22 KG/M2

## 2023-08-01 DIAGNOSIS — Z01.818 PREOP EXAMINATION: Primary | ICD-10-CM

## 2023-08-01 DIAGNOSIS — R73.02 IMPAIRED GLUCOSE TOLERANCE: ICD-10-CM

## 2023-08-01 DIAGNOSIS — G56.03 CARPAL TUNNEL SYNDROME, BILATERAL: ICD-10-CM

## 2023-08-01 DIAGNOSIS — G56.23 ULNAR NEUROPATHY OF BOTH UPPER EXTREMITIES: ICD-10-CM

## 2023-08-01 DIAGNOSIS — K21.9 GASTROESOPHAGEAL REFLUX DISEASE WITHOUT ESOPHAGITIS: ICD-10-CM

## 2023-08-01 PROCEDURE — 99214 OFFICE O/P EST MOD 30 MIN: CPT | Performed by: INTERNAL MEDICINE

## 2023-08-01 ASSESSMENT — ENCOUNTER SYMPTOMS
BACK PAIN: 0
NAUSEA: 0
CONSTIPATION: 0
WHEEZING: 0
COUGH: 0
SHORTNESS OF BREATH: 0
VOMITING: 0
CHEST TIGHTNESS: 0
SORE THROAT: 0
COLOR CHANGE: 0
ABDOMINAL PAIN: 0

## 2023-08-01 NOTE — ASSESSMENT & PLAN NOTE
last hemoglobin A1c 6.0 from 6 months ago ,reinforced recommendations to maintain low-carb diet with active lifestyle, will need updated lab work at her follow-up visit with Dr. Dionne Styles

## 2023-08-01 NOTE — PROGRESS NOTES
ASSESSMENT/PLAN:  1. Preop examination  Assessment & Plan:    history and physical exam updated today, patient is medically clear to proceed with scheduled surgery for both median and ulnar nerve decompression on left upper extremity. Her perioperative cardiovascular risk is low giving type of surgery, age and current medical problems. She did well with general anesthesia in the past and denies any complications with anesthesia. she is aware to avoid aspirin and NSAIDs at least 1 week before surgery. She will have updated lab work at her follow-up visit with regular provider for her yearly annual exam.  2. Ulnar neuropathy of both upper extremities  3. Carpal tunnel syndrome, bilateral  Assessment & Plan:  Scheduled for left side release surgery, follow-up with hand surgery as instructed   4. Gastroesophageal reflux disease without esophagitis  Assessment & Plan:    symptoms under control with current combination of a small bruise-year-old along with famotidine, continue same, maintain antireflux diet and follow-up with her regular provider as recommended  5. Impaired glucose tolerance  Assessment & Plan:    last hemoglobin A1c 6.0 from 6 months ago ,reinforced recommendations to maintain low-carb diet with active lifestyle, will need updated lab work at her follow-up visit with Dr. Irene Allen      Return in about 6 months (around 2/1/2024) for annual physical with Dr Irene Allen. SUBJECTIVE  HPI:   Patient of Dr. Irene Allen here for medical clearance, scheduled to have carpal tunnel release and ulnar nerve decompression surgery by Dr. Kaia New on 8/17 on the left side      Review of Systems   Constitutional:  Negative for activity change, appetite change and fatigue. HENT:  Negative for congestion, hearing loss, mouth sores and sore throat. Respiratory:  Negative for cough, chest tightness, shortness of breath and wheezing. Cardiovascular:  Negative for chest pain, palpitations and leg swelling.

## 2023-08-01 NOTE — ASSESSMENT & PLAN NOTE
history and physical exam updated today, patient is medically clear to proceed with scheduled surgery for both median and ulnar nerve decompression on left upper extremity. Her perioperative cardiovascular risk is low giving type of surgery, age and current medical problems. She did well with general anesthesia in the past and denies any complications with anesthesia. she is aware to avoid aspirin and NSAIDs at least 1 week before surgery.   She will have updated lab work at her follow-up visit with regular provider for her yearly annual exam.

## 2023-08-01 NOTE — ASSESSMENT & PLAN NOTE
symptoms under control with current combination of a small bruise-year-old along with famotidine, continue same, maintain antireflux diet and follow-up with her regular provider as recommended

## 2023-08-03 NOTE — PROGRESS NOTES
Advil, Naproxen prior to your surgery. We also ask that you stop any OTC medications such as fish oil, vitamin E, glucosamine, garlic, Multivitamins, COQ 10, etc.    We ask that you do not smoke 24 hours prior to surgery  We ask that you do not  drink any alcoholic beverages 24 hours prior to surgery     You must make arrangements for a responsible adult to take you home after your surgery. For your safety you will not be allowed to leave alone or drive yourself home. Your surgery will be cancelled if you do not have a ride home. Also for your safety, it is strongly suggested that someone stay with you the first 24 hours after your surgery. A parent or legal guardian must accompany a child scheduled for surgery and plan to stay at the hospital until the child is discharged. Please do not bring other children with you. For your comfort, please wear simple loose fitting clothing to the hospital.  Please do not bring valuables. Do not wear any make-up or nail polish on your fingers or toes. For your safety, please do not wear any jewelry or body piercing's on the day of surgery. All jewelry must be removed. If you have dentures, they will be removed before going to operating room. For your convenience, we will provide you with a container. If you wear contact lenses or glasses, they will be removed, please bring a case for them. If you have a living will and a durable power of  for healthcare, please bring in a copy. As part of our patient safety program to minimize surgical site infections, we ask you to do the following:    Please notify your surgeon if you develop any illness between         now and the day of your surgery. This includes a cough, cold, fever, sore throat, nausea,         or vomiting, and diarrhea, etc.   Please notify your surgeon if you experience dizziness, shortness         of breath or blurred vision between now and the time of your surgery. Do not shave your operative site 96 hours prior to surgery. For face and neck surgery, men may use an electric razor 48 hours   prior to surgery. You may shower the night before surgery or the morning of   your surgery with an antibacterial soap. You will need to bring a photo ID and insurance card     If you use a C-pap or Bi-pap machine, please bring your machine with you to the hospital     Our goal is to provide you with excellent care, therefore, visitors will be limited to so that we may focus on providing this care for you. Please contact your surgeon office, if you have any further questions. [facility] phone number:  1 [employer] [facility] fax number:  [phone]    Please note these are generalized instructions for all surgical cases, you may be provided with more specific instructions according to your surgery.

## 2023-08-16 ENCOUNTER — ANESTHESIA EVENT (OUTPATIENT)
Dept: OPERATING ROOM | Age: 58
End: 2023-08-16
Payer: COMMERCIAL

## 2023-08-17 ENCOUNTER — ANESTHESIA (OUTPATIENT)
Dept: OPERATING ROOM | Age: 58
End: 2023-08-17
Payer: COMMERCIAL

## 2023-08-17 ENCOUNTER — HOSPITAL ENCOUNTER (OUTPATIENT)
Age: 58
Setting detail: OUTPATIENT SURGERY
Discharge: HOME | End: 2023-08-17
Attending: ORTHOPAEDIC SURGERY | Admitting: ORTHOPAEDIC SURGERY
Payer: COMMERCIAL

## 2023-08-17 VITALS
RESPIRATION RATE: 16 BRPM | OXYGEN SATURATION: 97 % | TEMPERATURE: 96.9 F | DIASTOLIC BLOOD PRESSURE: 72 MMHG | HEIGHT: 62 IN | SYSTOLIC BLOOD PRESSURE: 167 MMHG | BODY MASS INDEX: 40.29 KG/M2 | WEIGHT: 218.92 LBS | HEART RATE: 86 BPM

## 2023-08-17 PROBLEM — G56.22 CUBITAL TUNNEL SYNDROME ON LEFT: Status: ACTIVE | Noted: 2023-08-17

## 2023-08-17 PROCEDURE — 6360000002 HC RX W HCPCS: Performed by: ORTHOPAEDIC SURGERY

## 2023-08-17 PROCEDURE — 3700000000 HC ANESTHESIA ATTENDED CARE: Performed by: ORTHOPAEDIC SURGERY

## 2023-08-17 PROCEDURE — 6360000002 HC RX W HCPCS

## 2023-08-17 PROCEDURE — 3700000001 HC ADD 15 MINUTES (ANESTHESIA): Performed by: ORTHOPAEDIC SURGERY

## 2023-08-17 PROCEDURE — 3600000015 HC SURGERY LEVEL 5 ADDTL 15MIN: Performed by: ORTHOPAEDIC SURGERY

## 2023-08-17 PROCEDURE — 2580000003 HC RX 258: Performed by: ORTHOPAEDIC SURGERY

## 2023-08-17 PROCEDURE — 64721 CARPAL TUNNEL SURGERY: CPT | Performed by: ORTHOPAEDIC SURGERY

## 2023-08-17 PROCEDURE — 7100000011 HC PHASE II RECOVERY - ADDTL 15 MIN: Performed by: ORTHOPAEDIC SURGERY

## 2023-08-17 PROCEDURE — 64718 REVISE ULNAR NERVE AT ELBOW: CPT | Performed by: ORTHOPAEDIC SURGERY

## 2023-08-17 PROCEDURE — 2500000003 HC RX 250 WO HCPCS

## 2023-08-17 PROCEDURE — A4217 STERILE WATER/SALINE, 500 ML: HCPCS | Performed by: ORTHOPAEDIC SURGERY

## 2023-08-17 PROCEDURE — 2580000003 HC RX 258: Performed by: ANESTHESIOLOGY

## 2023-08-17 PROCEDURE — 2709999900 HC NON-CHARGEABLE SUPPLY: Performed by: ORTHOPAEDIC SURGERY

## 2023-08-17 PROCEDURE — 7100000000 HC PACU RECOVERY - FIRST 15 MIN: Performed by: ORTHOPAEDIC SURGERY

## 2023-08-17 PROCEDURE — 7100000001 HC PACU RECOVERY - ADDTL 15 MIN: Performed by: ORTHOPAEDIC SURGERY

## 2023-08-17 PROCEDURE — 7100000010 HC PHASE II RECOVERY - FIRST 15 MIN: Performed by: ORTHOPAEDIC SURGERY

## 2023-08-17 PROCEDURE — 3600000005 HC SURGERY LEVEL 5 BASE: Performed by: ORTHOPAEDIC SURGERY

## 2023-08-17 RX ORDER — ONDANSETRON 2 MG/ML
INJECTION INTRAMUSCULAR; INTRAVENOUS PRN
Status: DISCONTINUED | OUTPATIENT
Start: 2023-08-17 | End: 2023-08-17 | Stop reason: SDUPTHER

## 2023-08-17 RX ORDER — FENTANYL CITRATE 0.05 MG/ML
25 INJECTION, SOLUTION INTRAMUSCULAR; INTRAVENOUS EVERY 5 MIN PRN
Status: DISCONTINUED | OUTPATIENT
Start: 2023-08-17 | End: 2023-08-17 | Stop reason: HOSPADM

## 2023-08-17 RX ORDER — FENTANYL CITRATE 50 UG/ML
INJECTION, SOLUTION INTRAMUSCULAR; INTRAVENOUS PRN
Status: DISCONTINUED | OUTPATIENT
Start: 2023-08-17 | End: 2023-08-17 | Stop reason: SDUPTHER

## 2023-08-17 RX ORDER — SODIUM CHLORIDE 0.9 % (FLUSH) 0.9 %
5-40 SYRINGE (ML) INJECTION PRN
Status: DISCONTINUED | OUTPATIENT
Start: 2023-08-17 | End: 2023-08-17 | Stop reason: HOSPADM

## 2023-08-17 RX ORDER — LIDOCAINE HYDROCHLORIDE 20 MG/ML
INJECTION, SOLUTION EPIDURAL; INFILTRATION; INTRACAUDAL; PERINEURAL PRN
Status: DISCONTINUED | OUTPATIENT
Start: 2023-08-17 | End: 2023-08-17 | Stop reason: SDUPTHER

## 2023-08-17 RX ORDER — MAGNESIUM HYDROXIDE 1200 MG/15ML
LIQUID ORAL CONTINUOUS PRN
Status: COMPLETED | OUTPATIENT
Start: 2023-08-17 | End: 2023-08-17

## 2023-08-17 RX ORDER — ONDANSETRON 2 MG/ML
4 INJECTION INTRAMUSCULAR; INTRAVENOUS
Status: DISCONTINUED | OUTPATIENT
Start: 2023-08-17 | End: 2023-08-17 | Stop reason: HOSPADM

## 2023-08-17 RX ORDER — DEXAMETHASONE SODIUM PHOSPHATE 4 MG/ML
INJECTION, SOLUTION INTRA-ARTICULAR; INTRALESIONAL; INTRAMUSCULAR; INTRAVENOUS; SOFT TISSUE PRN
Status: DISCONTINUED | OUTPATIENT
Start: 2023-08-17 | End: 2023-08-17 | Stop reason: SDUPTHER

## 2023-08-17 RX ORDER — SODIUM CHLORIDE 0.9 % (FLUSH) 0.9 %
5-40 SYRINGE (ML) INJECTION EVERY 12 HOURS SCHEDULED
Status: DISCONTINUED | OUTPATIENT
Start: 2023-08-17 | End: 2023-08-17 | Stop reason: HOSPADM

## 2023-08-17 RX ORDER — BUPIVACAINE HYDROCHLORIDE 5 MG/ML
INJECTION, SOLUTION EPIDURAL; INTRACAUDAL
Status: COMPLETED | OUTPATIENT
Start: 2023-08-17 | End: 2023-08-17

## 2023-08-17 RX ORDER — PROPOFOL 10 MG/ML
INJECTION, EMULSION INTRAVENOUS PRN
Status: DISCONTINUED | OUTPATIENT
Start: 2023-08-17 | End: 2023-08-17 | Stop reason: SDUPTHER

## 2023-08-17 RX ORDER — SODIUM CHLORIDE 9 MG/ML
INJECTION, SOLUTION INTRAVENOUS PRN
Status: DISCONTINUED | OUTPATIENT
Start: 2023-08-17 | End: 2023-08-17 | Stop reason: HOSPADM

## 2023-08-17 RX ADMIN — FENTANYL CITRATE 25 MCG: 50 INJECTION INTRAMUSCULAR; INTRAVENOUS at 09:31

## 2023-08-17 RX ADMIN — LIDOCAINE HYDROCHLORIDE 100 MG: 20 INJECTION, SOLUTION EPIDURAL; INFILTRATION; INTRACAUDAL; PERINEURAL at 09:28

## 2023-08-17 RX ADMIN — PROPOFOL 200 MG: 10 INJECTION, EMULSION INTRAVENOUS at 09:28

## 2023-08-17 RX ADMIN — SODIUM CHLORIDE: 9 INJECTION, SOLUTION INTRAVENOUS at 08:18

## 2023-08-17 RX ADMIN — ONDANSETRON 4 MG: 2 INJECTION INTRAMUSCULAR; INTRAVENOUS at 09:31

## 2023-08-17 RX ADMIN — DEXAMETHASONE SODIUM PHOSPHATE 8 MG: 4 INJECTION, SOLUTION INTRAMUSCULAR; INTRAVENOUS at 09:31

## 2023-08-17 RX ADMIN — FENTANYL CITRATE 25 MCG: 50 INJECTION INTRAMUSCULAR; INTRAVENOUS at 09:26

## 2023-08-17 ASSESSMENT — PAIN DESCRIPTION - LOCATION: LOCATION: ARM

## 2023-08-17 ASSESSMENT — PAIN DESCRIPTION - DESCRIPTORS: DESCRIPTORS: DISCOMFORT

## 2023-08-17 ASSESSMENT — PAIN DESCRIPTION - ONSET: ONSET: ON-GOING

## 2023-08-17 ASSESSMENT — PAIN - FUNCTIONAL ASSESSMENT
PAIN_FUNCTIONAL_ASSESSMENT: PREVENTS OR INTERFERES SOME ACTIVE ACTIVITIES AND ADLS
PAIN_FUNCTIONAL_ASSESSMENT: 0-10

## 2023-08-17 ASSESSMENT — PAIN SCALES - GENERAL
PAINLEVEL_OUTOF10: 0
PAINLEVEL_OUTOF10: 5
PAINLEVEL_OUTOF10: 0

## 2023-08-17 ASSESSMENT — PAIN DESCRIPTION - PAIN TYPE: TYPE: SURGICAL PAIN

## 2023-08-17 ASSESSMENT — PAIN DESCRIPTION - FREQUENCY: FREQUENCY: CONTINUOUS

## 2023-08-17 ASSESSMENT — PAIN DESCRIPTION - ORIENTATION: ORIENTATION: LEFT

## 2023-08-17 NOTE — PROGRESS NOTES
Patient admitted to PACU # 8   from OR at 5627 post LEFT ULNAR NERVE DECOMPRESSION (AT ELBOW), LEFT CARPAL TUNNEL RELEASE,  per Devonne Lanes, MD .  Attached to PACU monitoring system and report received from anesthesia provider. Patient was reported to be hemodynamically stable during procedure. Patient drowsy on admission.

## 2023-08-17 NOTE — H&P
Pre-operative Update of H&P:    I  have seen & examined Ms. Neil Salazar related solely to her hand and upper extremity conditions, prior to the scheduled procedure on the date of her surgery. The indications for the planned surgical procedure & and her upper-extremity condition are unchanged.

## 2023-08-17 NOTE — PROGRESS NOTES
Pt tolerating po. Discharge instructions given to patient and her family members. Verbalized understanding. IV d/c'd.

## 2023-08-17 NOTE — DISCHARGE INSTRUCTIONS
Post-Operative Instructions    Ulnar Nerve Release:    Keep hand elevated with fingers above eye-level to control swelling. Keep hand and bandage clean and dry. Do not change or unwrap bandage. Please leave bandage in place until your follow-up appointment. Maintain finger motion by fully straightening and fully bending fingers and thumb at least once an hour (while awake). This may cause some discomfort, but will not damage surgery. You may use your operated hand for lightweight tasks (e.g. writing, eating, dressing, etc.). NO LIFTING, CARRYING OR HEAVY USE. Most Patients do not have \"Serious Pain\" after this procedure and thus most patients do not require prescription pain medication. You may take over the counter medication (Tylenol, Advil, Aleve, etc.) as needed. If you are unable to tolerate the discomfort after your surgery and the OTC medications do not provide some relief, you may contact our office to discuss other options. .    Please call the office at [phone]  in 24 - 48 hours to schedule a follow up appointment for approximately 7 - 10 days after surgery. Please call the office at [phone]  if you have any questions or problems. Romana Angel MD

## 2023-08-17 NOTE — OP NOTE
OPERATIVE REPORT          Patient:  Marie Norton    YOB: 1965  Date of Service:  8/17/2023   Location:  [address] [place] [address]      Preoperative Diagnoses:  Left  carpal tunnel syndrome & Left cubital tunnel syndrome     Postoperative Diagnoses:  Same    Procedures:   Left  Carpal Tunnel Release & Left Ulnar Nerve decompression at the Cubital Tunnel     Surgeon:    Brittney Gracia. Royer Harding MD    Surgical Assistant:    LARRY Chu Assistant    Anesthesia:   General                                   Blood Loss:    Minimal         Complications:    None                          Tourniquet Time:   5 minutes      Indications: Ms. Marie Norton is a 65y.o.  year old female with Left  carpal tunnel syndrome & Left cubital tunnel syndrome . I have discussed preoperatively with her  the complications, limitations, expectations, alternatives and risk of the planned surgical care which she understood & all of her  questions were answered. Ms. Marie Norton has provided written informed consent to proceed. After written consent was obtained and the proper operative sites were identified and marked, Ms. Marie Norton was brought to the operating room and placed in the supine position on the operating room table with the Left arm extended upon a hand table. General anesthesia was induced & the Left upper extremity was prepped and draped in the usual sterile fashion. Procedure:   After Esmarch exsanguination, the pneuomo-tourniquet was inflated to 250 millimeters of Mercury about the arm. Attention was turned to the medial elbow. A curvilinear incision was fashioned over the posterior medial aspect of the elbow centered between the medial epicondyle and the tip of the olecranon. Dissection was carried carefully through the subcutaneous tissue identifying and protecting the superficial neurovascular structures.   The cubital tunnel was identified and cleared of overlying

## 2023-08-23 ENCOUNTER — OFFICE VISIT (OUTPATIENT)
Dept: ORTHOPEDIC SURGERY | Age: 58
End: 2023-08-23

## 2023-08-23 VITALS — WEIGHT: 218 LBS | RESPIRATION RATE: 16 BRPM | BODY MASS INDEX: 40.12 KG/M2 | HEIGHT: 62 IN

## 2023-08-23 DIAGNOSIS — G56.03 BILATERAL CARPAL TUNNEL SYNDROME: Primary | ICD-10-CM

## 2023-08-23 PROCEDURE — 99024 POSTOP FOLLOW-UP VISIT: CPT | Performed by: ORTHOPAEDIC SURGERY

## 2023-08-23 NOTE — PROGRESS NOTES
Ms. Petar Sanabria returns today in follow-up of her recent left Ulnar Nerve Decompression (Cubital Tunnel Release) & Carpal Tunnel Release done approximately 1 week ago. She has done well noting mild discomfort and no other reported complications. She notes pre-operative symptoms to be significantly improved at this time. Physical Exam:  Bandage intact and well cared for   Skin incision is healing well, without erythema, drainage or sign of infection. Digital range of motion is without significant limitation. Wrist range of motion is without significant limitation. Elbow range of motion is without significant limitation. Sensation is significantly improved in the Median Innervated Digits and Ulnar Innervated Digits. Vascular examination reveals normal, good capillary refill, and good color. Swelling is mild. Sensory and Motor Median & Ulnar Nerve function is intact. Impression:  Ms. Petar Sanabria is doing well after recent left Ulnar Nerve Decompression (Cubital Tunnel Release) &  Carpal Tunnel Release. Plan:  Ms. Petar Sanabria is instructed in work on Active & Passive range of motion of the digits, wrist, & elbow. These modalities were specifically demonstrated to her today. We discussed the appropriateness of gradual resumption of use of the operated hand and the return to normal use as comfort allows. She is given instructions regarding management of the fresh surgical incision and progressive use of desensitization and tissue massage techniques. We discussed the appropriate expectations and timeline for symptom improvement. She is provided a written patient instruction sheet titled: Postoperative Instructions After Ulnar Nerve Decompression. I have asked Ms. Petar Sanabria to follow-up with me or contact me by telephone over the next 2-4 weeks if her symptoms have not fully resolved or if she has not regained full & painless return of function.       She is also

## 2023-08-31 PROBLEM — Z01.818 PREOP EXAMINATION: Status: RESOLVED | Noted: 2023-08-01 | Resolved: 2023-08-31

## 2023-09-09 DIAGNOSIS — E03.9 ACQUIRED HYPOTHYROIDISM: ICD-10-CM

## 2023-09-11 RX ORDER — LEVOTHYROXINE SODIUM 112 UG/1
112 TABLET ORAL DAILY
Qty: 90 TABLET | Refills: 2 | Status: SHIPPED | OUTPATIENT
Start: 2023-09-11

## 2023-09-27 DIAGNOSIS — R23.2 HOT FLASHES: ICD-10-CM

## 2023-09-27 RX ORDER — ESTRADIOL 0.5 MG/1
0.5 TABLET ORAL DAILY
Qty: 90 TABLET | Refills: 3 | Status: SHIPPED | OUTPATIENT
Start: 2023-09-27

## 2023-12-01 ENCOUNTER — TELEPHONE (OUTPATIENT)
Dept: ORTHOPEDIC SURGERY | Age: 58
End: 2023-12-01

## 2023-12-01 ENCOUNTER — TELEPHONE (OUTPATIENT)
Dept: INTERNAL MEDICINE CLINIC | Age: 58
End: 2023-12-01

## 2023-12-01 NOTE — TELEPHONE ENCOUNTER
Pt is asking if provider could sign off for her surgery on 12/21/2023 because she was just seen in August for another pre op visit or would she need to be seen again before surgery. Please contact pt to let her know.

## 2023-12-01 NOTE — TELEPHONE ENCOUNTER
Surgery and/or Procedure Scheduling     Contact Name: Noemi Max \"Josep\"   Surgical/Procedure Request: CARPAL TUNNEL  Patient Contact Number: [phone]     PATIENT HAS QUESTIONS REGARDING HER SURGERY AND IS REQUESTING A CALL BACK AT THE NUMBER LISTED ABOVE

## 2023-12-06 ENCOUNTER — OFFICE VISIT (OUTPATIENT)
Dept: INTERNAL MEDICINE CLINIC | Age: 58
End: 2023-12-06
Payer: COMMERCIAL

## 2023-12-06 ENCOUNTER — TELEPHONE (OUTPATIENT)
Dept: ORTHOPEDIC SURGERY | Age: 58
End: 2023-12-06

## 2023-12-06 VITALS
HEART RATE: 93 BPM | BODY MASS INDEX: 32.7 KG/M2 | DIASTOLIC BLOOD PRESSURE: 80 MMHG | WEIGHT: 228.4 LBS | OXYGEN SATURATION: 100 % | HEIGHT: 70 IN | SYSTOLIC BLOOD PRESSURE: 128 MMHG

## 2023-12-06 DIAGNOSIS — B35.6 TINEA CRURIS: ICD-10-CM

## 2023-12-06 DIAGNOSIS — K21.9 GASTROESOPHAGEAL REFLUX DISEASE, UNSPECIFIED WHETHER ESOPHAGITIS PRESENT: ICD-10-CM

## 2023-12-06 DIAGNOSIS — R10.2 PELVIC PRESSURE IN FEMALE: Primary | ICD-10-CM

## 2023-12-06 DIAGNOSIS — N90.89 VULVAR IRRITATION: ICD-10-CM

## 2023-12-06 LAB
BILIRUBIN, POC: ABNORMAL
BLOOD URINE, POC: ABNORMAL
CLARITY, POC: ABNORMAL
COLOR, POC: ABNORMAL
GLUCOSE URINE, POC: ABNORMAL
KETONES, POC: ABNORMAL
LEUKOCYTE EST, POC: ABNORMAL
NITRITE, POC: ABNORMAL
PH, POC: 6
PROTEIN, POC: ABNORMAL
SPECIFIC GRAVITY, POC: >=1.03
UROBILINOGEN, POC: ABNORMAL

## 2023-12-06 PROCEDURE — 99213 OFFICE O/P EST LOW 20 MIN: CPT | Performed by: FAMILY MEDICINE

## 2023-12-06 PROCEDURE — 81002 URINALYSIS NONAUTO W/O SCOPE: CPT | Performed by: FAMILY MEDICINE

## 2023-12-06 RX ORDER — CLOTRIMAZOLE AND BETAMETHASONE DIPROPIONATE 10; .64 MG/G; MG/G
CREAM TOPICAL
Qty: 15 G | Refills: 0 | Status: SHIPPED | OUTPATIENT
Start: 2023-12-06 | End: 2023-12-20

## 2023-12-06 RX ORDER — ESOMEPRAZOLE MAGNESIUM 40 MG/1
CAPSULE, DELAYED RELEASE ORAL
Qty: 30 CAPSULE | Refills: 5 | Status: SHIPPED | OUTPATIENT
Start: 2023-12-06

## 2023-12-06 ASSESSMENT — ENCOUNTER SYMPTOMS
VOMITING: 0
DIARRHEA: 0
NAUSEA: 0

## 2023-12-06 NOTE — PATIENT INSTRUCTIONS
Consider Lume or Carpe lotion. Pick the hand lotion if it does not give you a choice of body lotion for areas that tend to perspire. Nexium.   Increase to Kath Fowler, Sat, Sun

## 2023-12-06 NOTE — PROGRESS NOTES
2023    Lubna Blount (:  1965) is a 63 y.o. 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 Took AZO yesterday has not taken any since yesterday morning having irritation when urinating and lower abdominal pain. )        ASSESSMENT/PLAN:    1. Pelvic pressure in female  Urine does not appear infected but will culture. - POCT Urinalysis no Micro  - Culture, Urine    2. Tinea cruris  This may be cause of current irritation. Treat with antifungal and steroid. Consider  Lume or Carpe lotion because she says she sweats a lot in this area. - clotrimazole-betamethasone (LOTRISONE) 1-0.05 % cream; Apply topically 2 times daily for 2 weeks  Dispense: 15 g; Refill: 0    3. Vulvar irritation  - Culture, Urine    4. Gastroesophageal reflux disease, unspecified whether esophagitis present  Increase dose to 4 days per week and if still not controlled, can go to daily med. Avoid fatty foods. - esomeprazole (NEXIUM) 40 MG delayed release capsule; TAKE 1 CAPSULE BY MOUTH DAILY EVERY DAY BEFORE BREAKFAST  Dispense: 30 capsule; Refill: 5        FOLLOW UP:  Call or return to clinic prn if these symptoms worsen or fail to improve as anticipated. HPI    1 month ago went to Urgent Care and had tingly, itchy and burning sensation in the vaginal area. 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 The Urgent care tested her and was HSV1 + and given antiviral.  Pain got better but yesterday she started to feel lower abdominal pressure and feels irritated in the perineum    No fever. A little back pain. Stomach is a little upset also and she started to throw up after eating a [employer] hamburger.    Had her GB out in the past.        Outpatient Medications Marked as Taking for the 23 encounter (Office Visit) with

## 2023-12-06 NOTE — TELEPHONE ENCOUNTER
PA requsted via [employer] thru [employer] w/clinicals. Reference # [id number]. The precert gives the provider customer service number as [phone].

## 2023-12-07 NOTE — TELEPHONE ENCOUNTER
Auth: # [id number]    Date Range: 2023-12-06 - 2024-06-06  Type of SX:  OUTPATIENT  Location: [facility]  CPT: 88619 + [phone]    DX Code: G56.21, G56.01   SX area: [facility]  Insurance: Dre Divine with [employer]in[employer] ([phone]). -[phone], concerning a different address on approval letter. There will be no impact against surgery.

## 2023-12-09 LAB
BACTERIA UR CULT: ABNORMAL
ORGANISM: ABNORMAL

## 2023-12-10 DIAGNOSIS — R30.0 DYSURIA: Primary | ICD-10-CM

## 2023-12-14 ENCOUNTER — OFFICE VISIT (OUTPATIENT)
Dept: INTERNAL MEDICINE CLINIC | Age: 58
End: 2023-12-14
Payer: COMMERCIAL

## 2023-12-14 VITALS
DIASTOLIC BLOOD PRESSURE: 80 MMHG | HEART RATE: 76 BPM | SYSTOLIC BLOOD PRESSURE: 110 MMHG | BODY MASS INDEX: 42.51 KG/M2 | WEIGHT: 231 LBS | HEIGHT: 62 IN

## 2023-12-14 DIAGNOSIS — R73.02 IMPAIRED GLUCOSE TOLERANCE: ICD-10-CM

## 2023-12-14 DIAGNOSIS — G56.01 CARPAL TUNNEL SYNDROME, RIGHT: ICD-10-CM

## 2023-12-14 DIAGNOSIS — G56.21 ULNAR NEUROPATHY OF RIGHT UPPER EXTREMITY: ICD-10-CM

## 2023-12-14 DIAGNOSIS — E66.01 OBESITY, MORBID (HCC): ICD-10-CM

## 2023-12-14 DIAGNOSIS — J45.20 MILD INTERMITTENT ASTHMA WITHOUT COMPLICATION: ICD-10-CM

## 2023-12-14 DIAGNOSIS — Z01.818 PREOP EXAMINATION: Primary | ICD-10-CM

## 2023-12-14 DIAGNOSIS — D33.2 BENIGN NEOPLASM OF BRAIN, UNSPECIFIED BRAIN REGION (HCC): ICD-10-CM

## 2023-12-14 PROCEDURE — 99213 OFFICE O/P EST LOW 20 MIN: CPT | Performed by: FAMILY MEDICINE

## 2023-12-14 NOTE — PROGRESS NOTES
Has a benign brain tumor or spot  Reviewed with patient. No headaches or symptoms. Followed by [facility]. On right side and monitored x 15 years.     Nothing changed so no need for regular MRI's

## 2023-12-14 NOTE — PROGRESS NOTES
Chief Complaint   Patient presents with    Pre-op Exam     Carpal tunnel repair on  with Dr. Michelle Stiles @ [facility]     Preoperative examination  Noemi Santnaa  63 y.o. [place] Casas female  1965    This patient presents today at the request of the surgeon for a preoperative consultation. Surgeon:  Dr. Michelle Stiles   Indication for surgery:  right hand and arm pain from CTS and ulnar nerve entrapment. Scheduled procedure:  Right ulnar nerve decompression at the elbow and Right carpal tunnel release. Date of surgery:  23   Previous anesthesia complications:  No  Family history of thrombophilia or hemophilia:  No   Recent chest pain or SOB:   No  Recent infection or exposure to communicable disease: No  Chronic steroid use:  No  Home Infestations:  Bed Bugs, Lice:  No  MRSA or TB history:  No     Patient Active Problem List   Diagnosis    Allergic rhinitis    Asthma    Hypothyroidism    Hormone replacement therapy    Benign neoplasm of brain, unspecified (HCC)    Pancreatic cyst    Obesity, morbid (HCC)    GERD (gastroesophageal reflux disease)    Impaired glucose tolerance    Vitamin D deficiency    Carpal tunnel syndrome, bilateral    Ulnar neuropathy of both upper extremities    Cubital tunnel syndrome on left       Social History     Tobacco Use    Smoking status: Former     Packs/day: 1.00     Years: 10.00     Additional pack years: 0.00     Total pack years: 10.00     Types: Cigarettes     Start date: 1988     Quit date: 1998     Years since quittin.9    Smokeless tobacco: Never   Vaping Use    Vaping Use: Never used   Substance Use Topics    Alcohol use: Not Currently     Alcohol/week: 3.0 standard drinks of alcohol     Types: 2 Glasses of wine, 1 Drinks containing 0.5 oz of alcohol per week     Comment: maybe a little more in summer. Drug use: Never       Allergies   Allergen Reactions    Cyclobenzaprine Other (See Comments)     On [facility] testing, this is a use with caution drug.

## 2023-12-20 ENCOUNTER — APPOINTMENT (OUTPATIENT)
Dept: CT IMAGING | Age: 58
DRG: 872 | End: 2023-12-20
Payer: COMMERCIAL

## 2023-12-20 ENCOUNTER — APPOINTMENT (OUTPATIENT)
Dept: GENERAL RADIOLOGY | Age: 58
DRG: 872 | End: 2023-12-20
Payer: COMMERCIAL

## 2023-12-20 ENCOUNTER — HOSPITAL ENCOUNTER (INPATIENT)
Age: 58
LOS: 6 days | Discharge: HOME | DRG: 872 | End: 2023-12-26
Attending: EMERGENCY MEDICINE | Admitting: STUDENT IN AN ORGANIZED HEALTH CARE EDUCATION/TRAINING PROGRAM
Payer: COMMERCIAL

## 2023-12-20 DIAGNOSIS — R65.20 SEVERE SEPSIS (HCC): ICD-10-CM

## 2023-12-20 DIAGNOSIS — N12 PYELONEPHRITIS: Primary | ICD-10-CM

## 2023-12-20 DIAGNOSIS — A41.9 SEVERE SEPSIS (HCC): ICD-10-CM

## 2023-12-20 DIAGNOSIS — E87.6 HYPOKALEMIA: ICD-10-CM

## 2023-12-20 DIAGNOSIS — E83.42 HYPOMAGNESEMIA: ICD-10-CM

## 2023-12-20 LAB
ALBUMIN SERPL-MCNC: 2.8 G/DL (ref 3.4–5)
ALBUMIN/GLOB SERPL: 0.7 {RATIO} (ref 1.1–2.2)
ALP SERPL-CCNC: 129 U/L (ref 40–129)
ALT SERPL-CCNC: 72 U/L (ref 10–40)
ANION GAP SERPL CALCULATED.3IONS-SCNC: 12 MMOL/L (ref 3–16)
AST SERPL-CCNC: 116 U/L (ref 15–37)
BACTERIA URNS QL MICRO: ABNORMAL /HPF
BASOPHILS # BLD: 0 K/UL (ref 0–0.2)
BASOPHILS NFR BLD: 0.3 %
BILIRUB SERPL-MCNC: 0.7 MG/DL (ref 0–1)
BILIRUB UR QL STRIP.AUTO: NEGATIVE
BUN SERPL-MCNC: 11 MG/DL (ref 7–20)
CALCIUM SERPL-MCNC: 8.4 MG/DL (ref 8.3–10.6)
CHLORIDE SERPL-SCNC: 98 MMOL/L (ref 99–110)
CLARITY UR: CLEAR
CO2 SERPL-SCNC: 23 MMOL/L (ref 21–32)
COLOR UR: YELLOW
CREAT SERPL-MCNC: 0.9 MG/DL (ref 0.6–1.1)
DEPRECATED RDW RBC AUTO: 13.4 % (ref 12.4–15.4)
EKG ATRIAL RATE: 120 BPM
EKG DIAGNOSIS: NORMAL
EKG P AXIS: 50 DEGREES
EKG P-R INTERVAL: 132 MS
EKG Q-T INTERVAL: 292 MS
EKG QRS DURATION: 76 MS
EKG QTC CALCULATION (BAZETT): 412 MS
EKG R AXIS: 64 DEGREES
EKG T AXIS: 35 DEGREES
EKG VENTRICULAR RATE: 120 BPM
EOSINOPHIL # BLD: 0 K/UL (ref 0–0.6)
EOSINOPHIL NFR BLD: 0.7 %
EPI CELLS #/AREA URNS AUTO: 4 /HPF (ref 0–5)
FLUAV RNA RESP QL NAA+PROBE: NOT DETECTED
FLUBV RNA RESP QL NAA+PROBE: NOT DETECTED
GFR SERPLBLD CREATININE-BSD FMLA CKD-EPI: >60 ML/MIN/{1.73_M2}
GLUCOSE SERPL-MCNC: 131 MG/DL (ref 70–99)
GLUCOSE UR STRIP.AUTO-MCNC: NEGATIVE MG/DL
HCT VFR BLD AUTO: 33 % (ref 36–48)
HGB BLD-MCNC: 11.3 G/DL (ref 12–16)
HGB UR QL STRIP.AUTO: ABNORMAL
HYALINE CASTS #/AREA URNS AUTO: 0 /LPF (ref 0–8)
KETONES UR STRIP.AUTO-MCNC: 15 MG/DL
LACTATE BLDV-SCNC: 2.2 MMOL/L (ref 0.4–1.9)
LEUKOCYTE ESTERASE UR QL STRIP.AUTO: ABNORMAL
LIPASE SERPL-CCNC: 22 U/L (ref 13–60)
LYMPHOCYTES # BLD: 0.2 K/UL (ref 1–5.1)
LYMPHOCYTES NFR BLD: 10.1 %
MAGNESIUM SERPL-MCNC: 1.6 MG/DL (ref 1.8–2.4)
MCH RBC QN AUTO: 33.1 PG (ref 26–34)
MCHC RBC AUTO-ENTMCNC: 34.1 G/DL (ref 31–36)
MCV RBC AUTO: 96.9 FL (ref 80–100)
MONOCYTES # BLD: 0 K/UL (ref 0–1.3)
MONOCYTES NFR BLD: 0.6 %
NEUTROPHILS # BLD: 1.9 K/UL (ref 1.7–7.7)
NEUTROPHILS NFR BLD: 88.3 %
NITRITE UR QL STRIP.AUTO: NEGATIVE
NT-PROBNP SERPL-MCNC: 73 PG/ML (ref 0–124)
PH UR STRIP.AUTO: 5.5 [PH] (ref 5–8)
PLATELET # BLD AUTO: 149 K/UL (ref 135–450)
PMV BLD AUTO: 8.2 FL (ref 5–10.5)
POTASSIUM SERPL-SCNC: 3.2 MMOL/L (ref 3.5–5.1)
PROCALCITONIN SERPL IA-MCNC: 4.28 NG/ML (ref 0–0.15)
PROT SERPL-MCNC: 6.6 G/DL (ref 6.4–8.2)
PROT UR STRIP.AUTO-MCNC: 30 MG/DL
RBC # BLD AUTO: 3.41 M/UL (ref 4–5.2)
RBC CLUMPS #/AREA URNS AUTO: 2 /HPF (ref 0–4)
SARS-COV-2 RNA RESP QL NAA+PROBE: NOT DETECTED
SODIUM SERPL-SCNC: 133 MMOL/L (ref 136–145)
SP GR UR STRIP.AUTO: >=1.03 (ref 1–1.03)
TROPONIN, HIGH SENSITIVITY: 7 NG/L (ref 0–14)
TROPONIN, HIGH SENSITIVITY: <6 NG/L (ref 0–14)
UA COMPLETE W REFLEX CULTURE PNL UR: YES
UA DIPSTICK W REFLEX MICRO PNL UR: YES
URN SPEC COLLECT METH UR: ABNORMAL
UROBILINOGEN UR STRIP-ACNC: 1 E.U./DL
WBC # BLD AUTO: 2.2 K/UL (ref 4–11)
WBC #/AREA URNS AUTO: 17 /HPF (ref 0–5)

## 2023-12-20 PROCEDURE — 1200000000 HC SEMI PRIVATE

## 2023-12-20 PROCEDURE — 71260 CT THORAX DX C+: CPT

## 2023-12-20 PROCEDURE — 81001 URINALYSIS AUTO W/SCOPE: CPT

## 2023-12-20 PROCEDURE — 2580000003 HC RX 258: Performed by: PHYSICIAN ASSISTANT

## 2023-12-20 PROCEDURE — 83605 ASSAY OF LACTIC ACID: CPT

## 2023-12-20 PROCEDURE — 87449 NOS EACH ORGANISM AG IA: CPT

## 2023-12-20 PROCEDURE — 87184 SC STD DISK METHOD PER PLATE: CPT

## 2023-12-20 PROCEDURE — 83690 ASSAY OF LIPASE: CPT

## 2023-12-20 PROCEDURE — 6360000002 HC RX W HCPCS: Performed by: STUDENT IN AN ORGANIZED HEALTH CARE EDUCATION/TRAINING PROGRAM

## 2023-12-20 PROCEDURE — 2580000003 HC RX 258: Performed by: EMERGENCY MEDICINE

## 2023-12-20 PROCEDURE — 6370000000 HC RX 637 (ALT 250 FOR IP): Performed by: EMERGENCY MEDICINE

## 2023-12-20 PROCEDURE — 83735 ASSAY OF MAGNESIUM: CPT

## 2023-12-20 PROCEDURE — 84484 ASSAY OF TROPONIN QUANT: CPT

## 2023-12-20 PROCEDURE — 6360000004 HC RX CONTRAST MEDICATION: Performed by: PHYSICIAN ASSISTANT

## 2023-12-20 PROCEDURE — 87086 URINE CULTURE/COLONY COUNT: CPT

## 2023-12-20 PROCEDURE — 85025 COMPLETE CBC W/AUTO DIFF WBC: CPT

## 2023-12-20 PROCEDURE — 83880 ASSAY OF NATRIURETIC PEPTIDE: CPT

## 2023-12-20 PROCEDURE — 74177 CT ABD & PELVIS W/CONTRAST: CPT

## 2023-12-20 PROCEDURE — 96365 THER/PROPH/DIAG IV INF INIT: CPT

## 2023-12-20 PROCEDURE — 80053 COMPREHEN METABOLIC PANEL: CPT

## 2023-12-20 PROCEDURE — 93005 ELECTROCARDIOGRAM TRACING: CPT | Performed by: PHYSICIAN ASSISTANT

## 2023-12-20 PROCEDURE — 93010 ELECTROCARDIOGRAM REPORT: CPT | Performed by: INTERNAL MEDICINE

## 2023-12-20 PROCEDURE — 87077 CULTURE AEROBIC IDENTIFY: CPT

## 2023-12-20 PROCEDURE — 87636 SARSCOV2 & INF A&B AMP PRB: CPT

## 2023-12-20 PROCEDURE — 87040 BLOOD CULTURE FOR BACTERIA: CPT

## 2023-12-20 PROCEDURE — 87186 SC STD MICRODIL/AGAR DIL: CPT

## 2023-12-20 PROCEDURE — 87807 RSV ASSAY W/OPTIC: CPT

## 2023-12-20 PROCEDURE — 96375 TX/PRO/DX INJ NEW DRUG ADDON: CPT

## 2023-12-20 PROCEDURE — 6360000002 HC RX W HCPCS: Performed by: PHYSICIAN ASSISTANT

## 2023-12-20 PROCEDURE — 71045 X-RAY EXAM CHEST 1 VIEW: CPT

## 2023-12-20 PROCEDURE — 87150 DNA/RNA AMPLIFIED PROBE: CPT

## 2023-12-20 PROCEDURE — 99285 EMERGENCY DEPT VISIT HI MDM: CPT

## 2023-12-20 PROCEDURE — 2580000003 HC RX 258: Performed by: STUDENT IN AN ORGANIZED HEALTH CARE EDUCATION/TRAINING PROGRAM

## 2023-12-20 PROCEDURE — 84145 PROCALCITONIN (PCT): CPT

## 2023-12-20 PROCEDURE — 87088 URINE BACTERIA CULTURE: CPT

## 2023-12-20 PROCEDURE — 6370000000 HC RX 637 (ALT 250 FOR IP): Performed by: PHYSICIAN ASSISTANT

## 2023-12-20 PROCEDURE — 6360000002 HC RX W HCPCS: Performed by: EMERGENCY MEDICINE

## 2023-12-20 PROCEDURE — 36415 COLL VENOUS BLD VENIPUNCTURE: CPT

## 2023-12-20 RX ORDER — ACETAMINOPHEN 500 MG
1000 TABLET ORAL ONCE
Status: COMPLETED | OUTPATIENT
Start: 2023-12-20 | End: 2023-12-20

## 2023-12-20 RX ORDER — SODIUM CHLORIDE 0.9 % (FLUSH) 0.9 %
5-40 SYRINGE (ML) INJECTION EVERY 12 HOURS SCHEDULED
Status: DISCONTINUED | OUTPATIENT
Start: 2023-12-20 | End: 2023-12-26 | Stop reason: HOSPADM

## 2023-12-20 RX ORDER — ACETAMINOPHEN 325 MG/1
650 TABLET ORAL EVERY 6 HOURS PRN
Status: DISCONTINUED | OUTPATIENT
Start: 2023-12-20 | End: 2023-12-26 | Stop reason: HOSPADM

## 2023-12-20 RX ORDER — ACETAMINOPHEN 650 MG/1
650 SUPPOSITORY RECTAL EVERY 6 HOURS PRN
Status: DISCONTINUED | OUTPATIENT
Start: 2023-12-20 | End: 2023-12-26 | Stop reason: HOSPADM

## 2023-12-20 RX ORDER — METOCLOPRAMIDE HYDROCHLORIDE 5 MG/ML
10 INJECTION INTRAMUSCULAR; INTRAVENOUS ONCE
Status: COMPLETED | OUTPATIENT
Start: 2023-12-20 | End: 2023-12-20

## 2023-12-20 RX ORDER — LEVOFLOXACIN 5 MG/ML
750 INJECTION, SOLUTION INTRAVENOUS ONCE
Status: DISCONTINUED | OUTPATIENT
Start: 2023-12-20 | End: 2023-12-20

## 2023-12-20 RX ORDER — POTASSIUM CHLORIDE 7.45 MG/ML
10 INJECTION INTRAVENOUS PRN
Status: DISCONTINUED | OUTPATIENT
Start: 2023-12-20 | End: 2023-12-26 | Stop reason: HOSPADM

## 2023-12-20 RX ORDER — POTASSIUM CHLORIDE 20 MEQ/1
40 TABLET, EXTENDED RELEASE ORAL PRN
Status: DISCONTINUED | OUTPATIENT
Start: 2023-12-20 | End: 2023-12-26 | Stop reason: HOSPADM

## 2023-12-20 RX ORDER — ONDANSETRON 2 MG/ML
4 INJECTION INTRAMUSCULAR; INTRAVENOUS ONCE
Status: COMPLETED | OUTPATIENT
Start: 2023-12-20 | End: 2023-12-20

## 2023-12-20 RX ORDER — SODIUM CHLORIDE 9 MG/ML
INJECTION, SOLUTION INTRAVENOUS CONTINUOUS
Status: DISCONTINUED | OUTPATIENT
Start: 2023-12-20 | End: 2023-12-20

## 2023-12-20 RX ORDER — SODIUM CHLORIDE 0.9 % (FLUSH) 0.9 %
5-40 SYRINGE (ML) INJECTION PRN
Status: DISCONTINUED | OUTPATIENT
Start: 2023-12-20 | End: 2023-12-26 | Stop reason: HOSPADM

## 2023-12-20 RX ORDER — 0.9 % SODIUM CHLORIDE 0.9 %
30 INTRAVENOUS SOLUTION INTRAVENOUS ONCE
Status: COMPLETED | OUTPATIENT
Start: 2023-12-20 | End: 2023-12-20

## 2023-12-20 RX ORDER — MAGNESIUM SULFATE IN WATER 40 MG/ML
2000 INJECTION, SOLUTION INTRAVENOUS ONCE
Status: COMPLETED | OUTPATIENT
Start: 2023-12-20 | End: 2023-12-20

## 2023-12-20 RX ORDER — ENOXAPARIN SODIUM 100 MG/ML
40 INJECTION SUBCUTANEOUS DAILY
Status: DISCONTINUED | OUTPATIENT
Start: 2023-12-20 | End: 2023-12-26 | Stop reason: HOSPADM

## 2023-12-20 RX ORDER — SODIUM CHLORIDE, SODIUM LACTATE, POTASSIUM CHLORIDE, CALCIUM CHLORIDE 600; 310; 30; 20 MG/100ML; MG/100ML; MG/100ML; MG/100ML
INJECTION, SOLUTION INTRAVENOUS CONTINUOUS
Status: ACTIVE | OUTPATIENT
Start: 2023-12-20 | End: 2023-12-22

## 2023-12-20 RX ORDER — MAGNESIUM SULFATE IN WATER 40 MG/ML
2000 INJECTION, SOLUTION INTRAVENOUS PRN
Status: DISCONTINUED | OUTPATIENT
Start: 2023-12-20 | End: 2023-12-26 | Stop reason: HOSPADM

## 2023-12-20 RX ORDER — ONDANSETRON 4 MG/1
4 TABLET, ORALLY DISINTEGRATING ORAL EVERY 8 HOURS PRN
Status: DISCONTINUED | OUTPATIENT
Start: 2023-12-20 | End: 2023-12-26 | Stop reason: HOSPADM

## 2023-12-20 RX ORDER — LEVOTHYROXINE SODIUM 112 UG/1
112 TABLET ORAL
Status: DISCONTINUED | OUTPATIENT
Start: 2023-12-21 | End: 2023-12-26 | Stop reason: HOSPADM

## 2023-12-20 RX ORDER — POLYETHYLENE GLYCOL 3350 17 G/17G
17 POWDER, FOR SOLUTION ORAL DAILY PRN
Status: DISCONTINUED | OUTPATIENT
Start: 2023-12-20 | End: 2023-12-26 | Stop reason: HOSPADM

## 2023-12-20 RX ORDER — ONDANSETRON 2 MG/ML
4 INJECTION INTRAMUSCULAR; INTRAVENOUS EVERY 6 HOURS PRN
Status: DISCONTINUED | OUTPATIENT
Start: 2023-12-20 | End: 2023-12-26 | Stop reason: HOSPADM

## 2023-12-20 RX ORDER — SODIUM CHLORIDE 9 MG/ML
INJECTION, SOLUTION INTRAVENOUS PRN
Status: DISCONTINUED | OUTPATIENT
Start: 2023-12-20 | End: 2023-12-26 | Stop reason: HOSPADM

## 2023-12-20 RX ADMIN — MEROPENEM 1000 MG: 1 INJECTION, POWDER, FOR SOLUTION INTRAVENOUS at 21:40

## 2023-12-20 RX ADMIN — METOCLOPRAMIDE 10 MG: 5 INJECTION, SOLUTION INTRAMUSCULAR; INTRAVENOUS at 15:36

## 2023-12-20 RX ADMIN — Medication 10 ML: at 21:44

## 2023-12-20 RX ADMIN — POTASSIUM BICARBONATE 40 MEQ: 782 TABLET, EFFERVESCENT ORAL at 15:36

## 2023-12-20 RX ADMIN — SODIUM CHLORIDE, POTASSIUM CHLORIDE, SODIUM LACTATE AND CALCIUM CHLORIDE: 600; 310; 30; 20 INJECTION, SOLUTION INTRAVENOUS at 21:35

## 2023-12-20 RX ADMIN — ACETAMINOPHEN 1000 MG: 500 TABLET ORAL at 15:36

## 2023-12-20 RX ADMIN — SODIUM CHLORIDE: 9 INJECTION, SOLUTION INTRAVENOUS at 15:36

## 2023-12-20 RX ADMIN — MAGNESIUM SULFATE HEPTAHYDRATE 2000 MG: 40 INJECTION, SOLUTION INTRAVENOUS at 15:37

## 2023-12-20 RX ADMIN — ONDANSETRON 4 MG: 2 INJECTION INTRAMUSCULAR; INTRAVENOUS at 12:53

## 2023-12-20 RX ADMIN — SODIUM CHLORIDE 1503 ML: 9 INJECTION, SOLUTION INTRAVENOUS at 12:54

## 2023-12-20 RX ADMIN — CEFTRIAXONE SODIUM 1000 MG: 1 INJECTION, POWDER, FOR SOLUTION INTRAMUSCULAR; INTRAVENOUS at 16:56

## 2023-12-20 RX ADMIN — ENOXAPARIN SODIUM 40 MG: 100 INJECTION SUBCUTANEOUS at 21:44

## 2023-12-20 RX ADMIN — IOPAMIDOL 75 ML: 755 INJECTION, SOLUTION INTRAVENOUS at 13:51

## 2023-12-20 RX ADMIN — AZITHROMYCIN MONOHYDRATE 500 MG: 500 INJECTION, POWDER, LYOPHILIZED, FOR SOLUTION INTRAVENOUS at 15:37

## 2023-12-20 NOTE — H&P
Hospital Medicine History & Physical        Name:  Mona New  /Age/Sex: 1965  (63 y.o. female)  MRN & CSN:  [id number] & [id number]     PCP: Federica Preston MD    Date of Admission: 2023    Date of Service: Patient seen/examined on 2023    Patient Status:  Inpatient - Patient will most likely require more than 48 hours of treatment and requires intensive medical treatment/monitoring     Chief Complaint:    Chief Complaint   Patient presents with    Generalized Body Aches     Patient in with complaints of being told she had a uti and viral infection . States her symptoms are getting worse. She is nauseated and no relief with zofran          History Of Present Illness:     63 y.o. female with PMHx of hypothyroid who presented to [facility] with complaints of generalized body aches. Patient states she has had general body aches and fatigue for the last 5 days. She has also been experiencing dry heaves with significant nausea, but no vomiting as well. She also endorses some diarrhea, no blood in the stool. She saw her PCP about 2 weeks ago for general fatigue and illness. Patient was found to have multidrug-resistant UTI, but was never placed on antibiotics. Patient also endorses lower back/flank pain bilaterally. She does endorse her urine having a weird smell to it, but denies dysuria or hematuria. Patient endorses shortness of breath in the ED. She is currently on 5 L O2 via NC in the ED. Not on oxygen at baseline. She also endorses subjective fevers at home. Patient denies chest pain, GI symptoms, or edema. Denies tobacco, alcohol, or illicit drug use. Past Medical History:          Diagnosis Date    Allergic rhinitis     see problem list for specific +s.     Asthma     has cats in her home    Carpal tunnel syndrome, bilateral 2023    Headache     Hypothyroidism     Obesity     Pelvic relaxation     Urine, incontinence, stress female

## 2023-12-20 NOTE — ACP (ADVANCE CARE PLANNING)
Advanced Care Planning Note. Purpose of Encounter: Advanced care planning in light of sepsis  Parties In Attendance: Patient, Mary Medrano,   Decisional Capacity: Yes  Subjective: generalized body aches  Objective: Cr 0.9  Goals of Care Determination: Patient/POA wishes to seek full treatment  Plan:  Antibiotics. Labs. Imaging. Code Status: Full code    Time spent on Advanced care Plannin minutes  Advanced Care Planning Documents: Completed advanced directives on chart,  is the POA.     Braden Quesada DO  2023 5:27 PM

## 2023-12-21 PROBLEM — R78.81 E COLI BACTEREMIA: Status: ACTIVE | Noted: 2023-12-21

## 2023-12-21 PROBLEM — A41.50 GRAM NEGATIVE SEPSIS (HCC): Status: ACTIVE | Noted: 2023-12-21

## 2023-12-21 PROBLEM — Z16.12 INFECTION DUE TO ESBL-PRODUCING ESCHERICHIA COLI: Status: ACTIVE | Noted: 2023-12-21

## 2023-12-21 PROBLEM — E87.6 HYPOKALEMIA: Status: ACTIVE | Noted: 2023-12-21

## 2023-12-21 PROBLEM — E83.42 HYPOMAGNESEMIA: Status: ACTIVE | Noted: 2023-12-21

## 2023-12-21 PROBLEM — B96.20 E COLI BACTEREMIA: Status: ACTIVE | Noted: 2023-12-21

## 2023-12-21 PROBLEM — A49.8 INFECTION DUE TO ESBL-PRODUCING ESCHERICHIA COLI: Status: ACTIVE | Noted: 2023-12-21

## 2023-12-21 PROBLEM — G56.00 CARPAL TUNNEL SYNDROME: Status: ACTIVE | Noted: 2023-06-23

## 2023-12-21 LAB
ALBUMIN SERPL-MCNC: 2.9 G/DL (ref 3.4–5)
ANION GAP SERPL CALCULATED.3IONS-SCNC: 8 MMOL/L (ref 3–16)
BASOPHILS # BLD: 0 K/UL (ref 0–0.2)
BASOPHILS NFR BLD: 0.2 %
BUN SERPL-MCNC: 9 MG/DL (ref 7–20)
C DIFF TOX A+B STL QL IA: NORMAL
CALCIUM SERPL-MCNC: 8.4 MG/DL (ref 8.3–10.6)
CHLORIDE SERPL-SCNC: 103 MMOL/L (ref 99–110)
CK SERPL-CCNC: 177 U/L (ref 26–192)
CO2 SERPL-SCNC: 32 MMOL/L (ref 21–32)
CREAT SERPL-MCNC: 0.7 MG/DL (ref 0.6–1.1)
DEPRECATED RDW RBC AUTO: 13.4 % (ref 12.4–15.4)
EOSINOPHIL # BLD: 0.1 K/UL (ref 0–0.6)
EOSINOPHIL NFR BLD: 0.8 %
GFR SERPLBLD CREATININE-BSD FMLA CKD-EPI: >60 ML/MIN/{1.73_M2}
GLUCOSE SERPL-MCNC: 100 MG/DL (ref 70–99)
HCT VFR BLD AUTO: 33.3 % (ref 36–48)
HGB BLD-MCNC: 11.5 G/DL (ref 12–16)
LACTATE BLDV-SCNC: 1.1 MMOL/L (ref 0.4–2)
LACTATE BLDV-SCNC: 2.1 MMOL/L (ref 0.4–1.9)
LIPASE SERPL-CCNC: 18 U/L (ref 13–60)
LYMPHOCYTES # BLD: 1.4 K/UL (ref 1–5.1)
LYMPHOCYTES NFR BLD: 11.8 %
MAGNESIUM SERPL-MCNC: 2.1 MG/DL (ref 1.8–2.4)
MCH RBC QN AUTO: 33 PG (ref 26–34)
MCHC RBC AUTO-ENTMCNC: 34.4 G/DL (ref 31–36)
MCV RBC AUTO: 95.8 FL (ref 80–100)
MONOCYTES # BLD: 1.4 K/UL (ref 0–1.3)
MONOCYTES NFR BLD: 12 %
NEUTROPHILS # BLD: 8.8 K/UL (ref 1.7–7.7)
NEUTROPHILS NFR BLD: 75.2 %
PHOSPHATE SERPL-MCNC: 2.6 MG/DL (ref 2.5–4.9)
PLATELET # BLD AUTO: 193 K/UL (ref 135–450)
PMV BLD AUTO: 8.2 FL (ref 5–10.5)
POTASSIUM SERPL-SCNC: 3.4 MMOL/L (ref 3.5–5.1)
RBC # BLD AUTO: 3.48 M/UL (ref 4–5.2)
REPORT: NORMAL
RSV AG NOSE QL: NEGATIVE
SODIUM SERPL-SCNC: 143 MMOL/L (ref 136–145)
WBC # BLD AUTO: 11.7 K/UL (ref 4–11)

## 2023-12-21 PROCEDURE — 2580000003 HC RX 258: Performed by: STUDENT IN AN ORGANIZED HEALTH CARE EDUCATION/TRAINING PROGRAM

## 2023-12-21 PROCEDURE — 0202U NFCT DS 22 TRGT SARS-COV-2: CPT

## 2023-12-21 PROCEDURE — 85025 COMPLETE CBC W/AUTO DIFF WBC: CPT

## 2023-12-21 PROCEDURE — 83690 ASSAY OF LIPASE: CPT

## 2023-12-21 PROCEDURE — 6370000000 HC RX 637 (ALT 250 FOR IP): Performed by: INTERNAL MEDICINE

## 2023-12-21 PROCEDURE — 36415 COLL VENOUS BLD VENIPUNCTURE: CPT

## 2023-12-21 PROCEDURE — 80069 RENAL FUNCTION PANEL: CPT

## 2023-12-21 PROCEDURE — 83735 ASSAY OF MAGNESIUM: CPT

## 2023-12-21 PROCEDURE — 83605 ASSAY OF LACTIC ACID: CPT

## 2023-12-21 PROCEDURE — 6370000000 HC RX 637 (ALT 250 FOR IP): Performed by: STUDENT IN AN ORGANIZED HEALTH CARE EDUCATION/TRAINING PROGRAM

## 2023-12-21 PROCEDURE — 6370000000 HC RX 637 (ALT 250 FOR IP): Performed by: NURSE PRACTITIONER

## 2023-12-21 PROCEDURE — 99223 1ST HOSP IP/OBS HIGH 75: CPT | Performed by: INTERNAL MEDICINE

## 2023-12-21 PROCEDURE — 6360000002 HC RX W HCPCS: Performed by: STUDENT IN AN ORGANIZED HEALTH CARE EDUCATION/TRAINING PROGRAM

## 2023-12-21 PROCEDURE — 82550 ASSAY OF CK (CPK): CPT

## 2023-12-21 PROCEDURE — 1200000000 HC SEMI PRIVATE

## 2023-12-21 PROCEDURE — 87449 NOS EACH ORGANISM AG IA: CPT

## 2023-12-21 PROCEDURE — 87324 CLOSTRIDIUM AG IA: CPT

## 2023-12-21 RX ORDER — PANTOPRAZOLE SODIUM 40 MG/1
40 TABLET, DELAYED RELEASE ORAL
Status: DISCONTINUED | OUTPATIENT
Start: 2023-12-21 | End: 2023-12-26 | Stop reason: HOSPADM

## 2023-12-21 RX ORDER — IBUPROFEN 400 MG/1
400 TABLET ORAL ONCE
Status: COMPLETED | OUTPATIENT
Start: 2023-12-21 | End: 2023-12-21

## 2023-12-21 RX ORDER — BUTALBITAL, ACETAMINOPHEN AND CAFFEINE 50; 325; 40 MG/1; MG/1; MG/1
1 TABLET ORAL ONCE
Status: COMPLETED | OUTPATIENT
Start: 2023-12-21 | End: 2023-12-21

## 2023-12-21 RX ADMIN — ACETAMINOPHEN 650 MG: 325 TABLET ORAL at 17:35

## 2023-12-21 RX ADMIN — PANTOPRAZOLE SODIUM 40 MG: 40 TABLET, DELAYED RELEASE ORAL at 13:42

## 2023-12-21 RX ADMIN — MEROPENEM 1000 MG: 1 INJECTION, POWDER, FOR SOLUTION INTRAVENOUS at 21:24

## 2023-12-21 RX ADMIN — IBUPROFEN 400 MG: 400 TABLET, FILM COATED ORAL at 21:22

## 2023-12-21 RX ADMIN — POTASSIUM BICARBONATE 40 MEQ: 782 TABLET, EFFERVESCENT ORAL at 08:36

## 2023-12-21 RX ADMIN — ACETAMINOPHEN 650 MG: 325 TABLET ORAL at 00:53

## 2023-12-21 RX ADMIN — MEROPENEM 1000 MG: 1 INJECTION, POWDER, FOR SOLUTION INTRAVENOUS at 12:07

## 2023-12-21 RX ADMIN — LEVOTHYROXINE SODIUM 112 MCG: 0.11 TABLET ORAL at 05:05

## 2023-12-21 RX ADMIN — Medication 10 ML: at 21:24

## 2023-12-21 RX ADMIN — ENOXAPARIN SODIUM 40 MG: 100 INJECTION SUBCUTANEOUS at 08:36

## 2023-12-21 RX ADMIN — ONDANSETRON 4 MG: 2 INJECTION INTRAMUSCULAR; INTRAVENOUS at 17:35

## 2023-12-21 RX ADMIN — ACETAMINOPHEN 650 MG: 325 TABLET ORAL at 08:45

## 2023-12-21 RX ADMIN — MEROPENEM 1000 MG: 1 INJECTION, POWDER, FOR SOLUTION INTRAVENOUS at 05:06

## 2023-12-21 RX ADMIN — BUTALBITAL, ACETAMINOPHEN, AND CAFFEINE 1 TABLET: 50; 325; 40 TABLET ORAL at 11:03

## 2023-12-21 NOTE — PLAN OF CARE
Problem: Discharge Planning  Goal: Discharge to home or other facility with appropriate resources  06/67/7952 [id number] by Ana Cristina Godoy RN  Outcome: Progressing  12/20/2023 2236 by Nazia Bone RN  Outcome: Progressing     Problem: Pain  Goal: Verbalizes/displays adequate comfort level or baseline comfort level  93/98/7184 [id number] by Ana Cristina Godoy RN  Outcome: Progressing  12/20/2023 2236 by Nazia Bone RN  Outcome: Progressing     Problem: Skin/Tissue Integrity  Goal: Absence of new skin breakdown  Description: 1. Monitor for areas of redness and/or skin breakdown  2. Assess vascular access sites hourly  3. Every 4-6 hours minimum:  Change oxygen saturation probe site  4. Every 4-6 hours:  If on nasal continuous positive airway pressure, respiratory therapy assess nares and determine need for appliance change or resting period.   73/15/0532 [id number] by Ana Cristina Godoy RN  Outcome: Progressing  12/20/2023 2236 by Nazia Bone RN  Outcome: Progressing

## 2023-12-22 LAB
ALBUMIN SERPL-MCNC: 3.1 G/DL (ref 3.4–5)
ANION GAP SERPL CALCULATED.3IONS-SCNC: 4 MMOL/L (ref 3–16)
BACTERIA UR CULT: ABNORMAL
BASOPHILS # BLD: 0.1 K/UL (ref 0–0.2)
BASOPHILS NFR BLD: 0.5 %
BUN SERPL-MCNC: 8 MG/DL (ref 7–20)
CALCIUM SERPL-MCNC: 8.8 MG/DL (ref 8.3–10.6)
CHLORIDE SERPL-SCNC: 100 MMOL/L (ref 99–110)
CO2 SERPL-SCNC: 36 MMOL/L (ref 21–32)
CREAT SERPL-MCNC: 0.8 MG/DL (ref 0.6–1.1)
DEPRECATED RDW RBC AUTO: 13.5 % (ref 12.4–15.4)
EOSINOPHIL # BLD: 0.2 K/UL (ref 0–0.6)
EOSINOPHIL NFR BLD: 1.7 %
GFR SERPLBLD CREATININE-BSD FMLA CKD-EPI: >60 ML/MIN/{1.73_M2}
GLUCOSE SERPL-MCNC: 130 MG/DL (ref 70–99)
HCT VFR BLD AUTO: 36.2 % (ref 36–48)
HGB BLD-MCNC: 12.2 G/DL (ref 12–16)
LYMPHOCYTES # BLD: 1.3 K/UL (ref 1–5.1)
LYMPHOCYTES NFR BLD: 11 %
MAGNESIUM SERPL-MCNC: 2.2 MG/DL (ref 1.8–2.4)
MCH RBC QN AUTO: 32.3 PG (ref 26–34)
MCHC RBC AUTO-ENTMCNC: 33.8 G/DL (ref 31–36)
MCV RBC AUTO: 95.5 FL (ref 80–100)
MONOCYTES # BLD: 0.9 K/UL (ref 0–1.3)
MONOCYTES NFR BLD: 7.7 %
NEUTROPHILS # BLD: 9.3 K/UL (ref 1.7–7.7)
NEUTROPHILS NFR BLD: 79.1 %
ORGANISM: ABNORMAL
PHOSPHATE SERPL-MCNC: 2.2 MG/DL (ref 2.5–4.9)
PLATELET # BLD AUTO: 264 K/UL (ref 135–450)
PMV BLD AUTO: 8.5 FL (ref 5–10.5)
POTASSIUM SERPL-SCNC: 3.1 MMOL/L (ref 3.5–5.1)
RBC # BLD AUTO: 3.79 M/UL (ref 4–5.2)
REPORT: NORMAL
RESP PATH DNA+RNA PNL NPH NAA+NON-PROBE: NORMAL
SODIUM SERPL-SCNC: 140 MMOL/L (ref 136–145)
WBC # BLD AUTO: 11.7 K/UL (ref 4–11)

## 2023-12-22 PROCEDURE — 6370000000 HC RX 637 (ALT 250 FOR IP): Performed by: INTERNAL MEDICINE

## 2023-12-22 PROCEDURE — 2580000003 HC RX 258: Performed by: STUDENT IN AN ORGANIZED HEALTH CARE EDUCATION/TRAINING PROGRAM

## 2023-12-22 PROCEDURE — 36415 COLL VENOUS BLD VENIPUNCTURE: CPT

## 2023-12-22 PROCEDURE — 6370000000 HC RX 637 (ALT 250 FOR IP): Performed by: STUDENT IN AN ORGANIZED HEALTH CARE EDUCATION/TRAINING PROGRAM

## 2023-12-22 PROCEDURE — 80069 RENAL FUNCTION PANEL: CPT

## 2023-12-22 PROCEDURE — 1200000000 HC SEMI PRIVATE

## 2023-12-22 PROCEDURE — 6370000000 HC RX 637 (ALT 250 FOR IP)

## 2023-12-22 PROCEDURE — 6360000002 HC RX W HCPCS: Performed by: STUDENT IN AN ORGANIZED HEALTH CARE EDUCATION/TRAINING PROGRAM

## 2023-12-22 PROCEDURE — 85025 COMPLETE CBC W/AUTO DIFF WBC: CPT

## 2023-12-22 PROCEDURE — 99233 SBSQ HOSP IP/OBS HIGH 50: CPT | Performed by: INTERNAL MEDICINE

## 2023-12-22 PROCEDURE — 83735 ASSAY OF MAGNESIUM: CPT

## 2023-12-22 PROCEDURE — 87040 BLOOD CULTURE FOR BACTERIA: CPT

## 2023-12-22 RX ORDER — FLUTICASONE PROPIONATE 50 MCG
1 SPRAY, SUSPENSION (ML) NASAL DAILY
Status: DISCONTINUED | OUTPATIENT
Start: 2023-12-22 | End: 2023-12-26 | Stop reason: HOSPADM

## 2023-12-22 RX ORDER — IBUPROFEN 400 MG/1
800 TABLET ORAL ONCE
Status: COMPLETED | OUTPATIENT
Start: 2023-12-22 | End: 2023-12-22

## 2023-12-22 RX ORDER — LACTOBACILLUS RHAMNOSUS GG 10B CELL
1 CAPSULE ORAL 2 TIMES DAILY WITH MEALS
Status: DISCONTINUED | OUTPATIENT
Start: 2023-12-22 | End: 2023-12-26 | Stop reason: HOSPADM

## 2023-12-22 RX ORDER — POTASSIUM CHLORIDE 20 MEQ/1
40 TABLET, EXTENDED RELEASE ORAL 2 TIMES DAILY
Status: COMPLETED | OUTPATIENT
Start: 2023-12-22 | End: 2023-12-22

## 2023-12-22 RX ORDER — FLUTICASONE PROPIONATE 50 MCG
SPRAY, SUSPENSION (ML) NASAL
Status: COMPLETED
Start: 2023-12-22 | End: 2023-12-22

## 2023-12-22 RX ORDER — VANCOMYCIN HYDROCHLORIDE 50 MG/ML
250 KIT ORAL EVERY 12 HOURS SCHEDULED
Status: DISCONTINUED | OUTPATIENT
Start: 2023-12-22 | End: 2023-12-26 | Stop reason: HOSPADM

## 2023-12-22 RX ADMIN — MEROPENEM 1000 MG: 1 INJECTION, POWDER, FOR SOLUTION INTRAVENOUS at 12:25

## 2023-12-22 RX ADMIN — Medication 10 ML: at 21:23

## 2023-12-22 RX ADMIN — POTASSIUM CHLORIDE 40 MEQ: 1500 TABLET, EXTENDED RELEASE ORAL at 11:43

## 2023-12-22 RX ADMIN — MEROPENEM 1000 MG: 1 INJECTION, POWDER, FOR SOLUTION INTRAVENOUS at 04:31

## 2023-12-22 RX ADMIN — MEROPENEM 1000 MG: 1 INJECTION, POWDER, FOR SOLUTION INTRAVENOUS at 21:23

## 2023-12-22 RX ADMIN — SODIUM CHLORIDE, POTASSIUM CHLORIDE, SODIUM LACTATE AND CALCIUM CHLORIDE: 600; 310; 30; 20 INJECTION, SOLUTION INTRAVENOUS at 00:22

## 2023-12-22 RX ADMIN — IBUPROFEN 800 MG: 400 TABLET, FILM COATED ORAL at 12:22

## 2023-12-22 RX ADMIN — FLUTICASONE PROPIONATE 1 SPRAY: 50 SPRAY, METERED NASAL at 12:22

## 2023-12-22 RX ADMIN — POTASSIUM CHLORIDE 40 MEQ: 1500 TABLET, EXTENDED RELEASE ORAL at 15:53

## 2023-12-22 RX ADMIN — LEVOTHYROXINE SODIUM 112 MCG: 0.11 TABLET ORAL at 06:36

## 2023-12-22 RX ADMIN — ONDANSETRON 4 MG: 2 INJECTION INTRAMUSCULAR; INTRAVENOUS at 09:00

## 2023-12-22 RX ADMIN — Medication 1 CAPSULE: at 09:00

## 2023-12-22 RX ADMIN — Medication 250 MG: at 21:18

## 2023-12-22 RX ADMIN — ACETAMINOPHEN 650 MG: 325 TABLET ORAL at 21:30

## 2023-12-22 RX ADMIN — POTASSIUM CHLORIDE 40 MEQ: 1500 TABLET, EXTENDED RELEASE ORAL at 08:24

## 2023-12-22 RX ADMIN — PANTOPRAZOLE SODIUM 40 MG: 40 TABLET, DELAYED RELEASE ORAL at 06:36

## 2023-12-22 RX ADMIN — ENOXAPARIN SODIUM 40 MG: 100 INJECTION SUBCUTANEOUS at 08:16

## 2023-12-22 RX ADMIN — ACETAMINOPHEN 650 MG: 325 TABLET ORAL at 12:22

## 2023-12-22 RX ADMIN — Medication 1 SPRAY: at 08:15

## 2023-12-22 RX ADMIN — Medication 1 CAPSULE: at 15:53

## 2023-12-22 NOTE — PLAN OF CARE
Problem: Discharge Planning  Goal: Discharge to home or other facility with appropriate resources  Outcome: Progressing     Problem: Pain  Goal: Verbalizes/displays adequate comfort level or baseline comfort level  Outcome: Progressing     Problem: Skin/Tissue Integrity  Goal: Absence of new skin breakdown  Description: 1. Monitor for areas of redness and/or skin breakdown  2. Assess vascular access sites hourly  3. Every 4-6 hours minimum:  Change oxygen saturation probe site  4. Every 4-6 hours:  If on nasal continuous positive airway pressure, respiratory therapy assess nares and determine need for appliance change or resting period.   Outcome: Progressing     Problem: Nutrition Deficit:  Goal: Optimize nutritional status  Outcome: Progressing

## 2023-12-22 NOTE — DISCHARGE INSTR - COC
Elimination:  Continence: Bowel: Yes  Bladder: Yes  Urinary Catheter: None   Colostomy/Ileostomy/Ileal Conduit: No       Date of Last BM: n/a    Intake/Output Summary (Last 24 hours) at 12/22/2023 1510  Last data filed at 12/21/2023 2124  Gross per 24 hour   Intake 1724.98 ml   Output --   Net 1724.98 ml     I/O last 3 completed shifts: In: 2085 [P.O.:480; I.V.:1304.9; IV Piggyback:300.1]  Out: 400 [Urine:400]    Safety Concerns:     None    Impairments/Disabilities:      None    Nutrition Therapy:  Current Nutrition Therapy:   - Oral Diet:  General    Routes of Feeding: Oral  Liquids: Thin Liquids  Daily Fluid Restriction: no  Last Modified Barium Swallow with Video (Video Swallowing Test): not done    Treatments at the Time of Hospital Discharge:   Respiratory Treatments: none  Oxygen Therapy:  is not on home oxygen therapy. Ventilator:    - No ventilator support    Rehab Therapies: n/a  Weight Bearing Status/Restrictions: No weight bearing restrictions  Other Medical Equipment (for information only, NOT a DME order): none  Other Treatments: n/a    Patient's personal belongings (please select all that are sent with patient):  N/a    RN SIGNATURE:  Electronically signed by Leeanna Vazquez RN on 12/26/23 at 1:39 PM EST    CASE MANAGEMENT/SOCIAL WORK SECTION    Inpatient Status Date: 12-    Readmission Risk Assessment Score: 6  Readmission Risk              Risk of Unplanned Readmission:  [address]  Phone: [phone]  Fax: [phone]     Discharging to Facility/ [address]  Phone: [phone]  Fax: [phone]       / signature: Electronically signed by Rae Ross RN on 12/26/23 at 10:16 AM EST    PHYSICIAN SECTION    Prognosis: Fair    Condition at Discharge: Stable    Rehab Potential (if transferring to Rehab):  Fair    Recommended Labs or Other Treatments After Discharge:

## 2023-12-23 LAB
ALBUMIN SERPL-MCNC: 3 G/DL (ref 3.4–5)
ANION GAP SERPL CALCULATED.3IONS-SCNC: 7 MMOL/L (ref 3–16)
BASOPHILS # BLD: 0.1 K/UL (ref 0–0.2)
BASOPHILS NFR BLD: 0.6 %
BUN SERPL-MCNC: 8 MG/DL (ref 7–20)
CALCIUM SERPL-MCNC: 8.7 MG/DL (ref 8.3–10.6)
CHLORIDE SERPL-SCNC: 104 MMOL/L (ref 99–110)
CO2 SERPL-SCNC: 29 MMOL/L (ref 21–32)
CREAT SERPL-MCNC: 0.7 MG/DL (ref 0.6–1.1)
DEPRECATED RDW RBC AUTO: 13.6 % (ref 12.4–15.4)
EOSINOPHIL # BLD: 0.2 K/UL (ref 0–0.6)
EOSINOPHIL NFR BLD: 1.9 %
GFR SERPLBLD CREATININE-BSD FMLA CKD-EPI: >60 ML/MIN/{1.73_M2}
GLUCOSE SERPL-MCNC: 117 MG/DL (ref 70–99)
HCT VFR BLD AUTO: 34 % (ref 36–48)
HGB BLD-MCNC: 11.7 G/DL (ref 12–16)
LEGIONELLA AG UR QL: NORMAL
LYMPHOCYTES # BLD: 1.7 K/UL (ref 1–5.1)
LYMPHOCYTES NFR BLD: 15.5 %
MAGNESIUM SERPL-MCNC: 2.2 MG/DL (ref 1.8–2.4)
MCH RBC QN AUTO: 32.8 PG (ref 26–34)
MCHC RBC AUTO-ENTMCNC: 34.3 G/DL (ref 31–36)
MCV RBC AUTO: 95.5 FL (ref 80–100)
MONOCYTES # BLD: 0.8 K/UL (ref 0–1.3)
MONOCYTES NFR BLD: 7.4 %
NEUTROPHILS # BLD: 8.2 K/UL (ref 1.7–7.7)
NEUTROPHILS NFR BLD: 74.6 %
PHOSPHATE SERPL-MCNC: 2.2 MG/DL (ref 2.5–4.9)
PLATELET # BLD AUTO: 293 K/UL (ref 135–450)
PMV BLD AUTO: 8.7 FL (ref 5–10.5)
POTASSIUM SERPL-SCNC: 4.1 MMOL/L (ref 3.5–5.1)
RBC # BLD AUTO: 3.57 M/UL (ref 4–5.2)
S PNEUM AG UR QL: NORMAL
SODIUM SERPL-SCNC: 140 MMOL/L (ref 136–145)
WBC # BLD AUTO: 10.9 K/UL (ref 4–11)

## 2023-12-23 PROCEDURE — 6370000000 HC RX 637 (ALT 250 FOR IP): Performed by: STUDENT IN AN ORGANIZED HEALTH CARE EDUCATION/TRAINING PROGRAM

## 2023-12-23 PROCEDURE — 6370000000 HC RX 637 (ALT 250 FOR IP): Performed by: INTERNAL MEDICINE

## 2023-12-23 PROCEDURE — 1200000000 HC SEMI PRIVATE

## 2023-12-23 PROCEDURE — 36415 COLL VENOUS BLD VENIPUNCTURE: CPT

## 2023-12-23 PROCEDURE — 83735 ASSAY OF MAGNESIUM: CPT

## 2023-12-23 PROCEDURE — 6360000002 HC RX W HCPCS: Performed by: STUDENT IN AN ORGANIZED HEALTH CARE EDUCATION/TRAINING PROGRAM

## 2023-12-23 PROCEDURE — 2580000003 HC RX 258: Performed by: STUDENT IN AN ORGANIZED HEALTH CARE EDUCATION/TRAINING PROGRAM

## 2023-12-23 PROCEDURE — 85025 COMPLETE CBC W/AUTO DIFF WBC: CPT

## 2023-12-23 PROCEDURE — 80069 RENAL FUNCTION PANEL: CPT

## 2023-12-23 RX ADMIN — Medication 1 CAPSULE: at 11:04

## 2023-12-23 RX ADMIN — Medication 250 MG: at 21:01

## 2023-12-23 RX ADMIN — MEROPENEM 1000 MG: 1 INJECTION, POWDER, FOR SOLUTION INTRAVENOUS at 04:45

## 2023-12-23 RX ADMIN — MEROPENEM 1000 MG: 1 INJECTION, POWDER, FOR SOLUTION INTRAVENOUS at 21:06

## 2023-12-23 RX ADMIN — LEVOTHYROXINE SODIUM 112 MCG: 0.11 TABLET ORAL at 06:50

## 2023-12-23 RX ADMIN — MEROPENEM 1000 MG: 1 INJECTION, POWDER, FOR SOLUTION INTRAVENOUS at 13:08

## 2023-12-23 RX ADMIN — SODIUM CHLORIDE: 9 INJECTION, SOLUTION INTRAVENOUS at 13:07

## 2023-12-23 RX ADMIN — Medication 10 ML: at 21:06

## 2023-12-23 RX ADMIN — Medication 1 CAPSULE: at 16:55

## 2023-12-23 RX ADMIN — Medication 250 MG: at 13:02

## 2023-12-23 RX ADMIN — ACETAMINOPHEN 650 MG: 325 TABLET ORAL at 11:21

## 2023-12-23 RX ADMIN — FLUTICASONE PROPIONATE 1 SPRAY: 50 SPRAY, METERED NASAL at 11:04

## 2023-12-23 RX ADMIN — ACETAMINOPHEN 650 MG: 325 TABLET ORAL at 21:32

## 2023-12-23 RX ADMIN — ENOXAPARIN SODIUM 40 MG: 100 INJECTION SUBCUTANEOUS at 11:05

## 2023-12-23 RX ADMIN — PANTOPRAZOLE SODIUM 40 MG: 40 TABLET, DELAYED RELEASE ORAL at 06:50

## 2023-12-23 RX ADMIN — Medication 10 ML: at 11:05

## 2023-12-23 NOTE — PLAN OF CARE
Problem: Discharge Planning  Goal: Discharge to home or other facility with appropriate resources  12/23/2023 1603 by Franko Saenz RN  Outcome: Progressing     Problem: Pain  Goal: Verbalizes/displays adequate comfort level or baseline comfort level  12/23/2023 1603 by Franko Saenz RN  Outcome: Progressing  Flowsheets (Taken 12/23/2023 1603)  Verbalizes/displays adequate comfort level or baseline comfort level:   Encourage patient to monitor pain and request assistance   Assess pain using appropriate pain scale   Administer analgesics based on type and severity of pain and evaluate response  Note: Patient had a headache this morning. Has no complaints at this time. Will continue to assess and monitor. Problem: Skin/Tissue Integrity  Goal: Absence of new skin breakdown  Description: 1. Monitor for areas of redness and/or skin breakdown  2. Assess vascular access sites hourly  3. Every 4-6 hours minimum:  Change oxygen saturation probe site  4. Every 4-6 hours:  If on nasal continuous positive airway pressure, respiratory therapy assess nares and determine need for appliance change or resting period. 12/23/2023 1603 by Franko Saenz RN  Outcome: Progressing  Note: No new skin breakdown identified. Patient is ambulatory and is able to provide herself appropriate care. Problem: Nutrition Deficit:  Goal: Optimize nutritional status  12/23/2023 1603 by Franko Saenz RN  Outcome: Progressing  Note: Patient is independent and is eating her meals on her own.

## 2023-12-24 LAB
BACTERIA BLD CULT ORG #2: ABNORMAL
BACTERIA BLD CULT: ABNORMAL
BACTERIA BLD CULT: ABNORMAL
ORGANISM: ABNORMAL

## 2023-12-24 PROCEDURE — 6370000000 HC RX 637 (ALT 250 FOR IP): Performed by: INTERNAL MEDICINE

## 2023-12-24 PROCEDURE — 6370000000 HC RX 637 (ALT 250 FOR IP): Performed by: STUDENT IN AN ORGANIZED HEALTH CARE EDUCATION/TRAINING PROGRAM

## 2023-12-24 PROCEDURE — 6360000002 HC RX W HCPCS: Performed by: STUDENT IN AN ORGANIZED HEALTH CARE EDUCATION/TRAINING PROGRAM

## 2023-12-24 PROCEDURE — 2580000003 HC RX 258: Performed by: INTERNAL MEDICINE

## 2023-12-24 PROCEDURE — 2580000003 HC RX 258: Performed by: STUDENT IN AN ORGANIZED HEALTH CARE EDUCATION/TRAINING PROGRAM

## 2023-12-24 PROCEDURE — 1200000000 HC SEMI PRIVATE

## 2023-12-24 RX ORDER — SODIUM CHLORIDE 0.9 % (FLUSH) 0.9 %
5-40 SYRINGE (ML) INJECTION EVERY 12 HOURS SCHEDULED
Status: DISCONTINUED | OUTPATIENT
Start: 2023-12-24 | End: 2023-12-26 | Stop reason: HOSPADM

## 2023-12-24 RX ORDER — SODIUM CHLORIDE 0.9 % (FLUSH) 0.9 %
5-40 SYRINGE (ML) INJECTION PRN
Status: DISCONTINUED | OUTPATIENT
Start: 2023-12-24 | End: 2023-12-26 | Stop reason: HOSPADM

## 2023-12-24 RX ORDER — LACTOBACILLUS RHAMNOSUS GG 10B CELL
1 CAPSULE ORAL 2 TIMES DAILY WITH MEALS
Qty: 60 CAPSULE | Refills: 0 | Status: SHIPPED | OUTPATIENT
Start: 2023-12-24 | End: 2023-12-26

## 2023-12-24 RX ORDER — LIDOCAINE HYDROCHLORIDE 10 MG/ML
5 INJECTION, SOLUTION EPIDURAL; INFILTRATION; INTRACAUDAL; PERINEURAL ONCE
Status: COMPLETED | OUTPATIENT
Start: 2023-12-24 | End: 2023-12-25

## 2023-12-24 RX ORDER — SODIUM CHLORIDE 9 MG/ML
25 INJECTION, SOLUTION INTRAVENOUS PRN
Status: DISCONTINUED | OUTPATIENT
Start: 2023-12-24 | End: 2023-12-26 | Stop reason: HOSPADM

## 2023-12-24 RX ORDER — VANCOMYCIN HYDROCHLORIDE 50 MG/ML
250 KIT ORAL EVERY 12 HOURS SCHEDULED
Qty: 140 ML | Refills: 0 | Status: SHIPPED | OUTPATIENT
Start: 2023-12-24 | End: 2023-12-26

## 2023-12-24 RX ADMIN — Medication 10 ML: at 11:01

## 2023-12-24 RX ADMIN — Medication 250 MG: at 20:18

## 2023-12-24 RX ADMIN — Medication 1 CAPSULE: at 17:48

## 2023-12-24 RX ADMIN — Medication 10 ML: at 21:17

## 2023-12-24 RX ADMIN — PANTOPRAZOLE SODIUM 40 MG: 40 TABLET, DELAYED RELEASE ORAL at 06:29

## 2023-12-24 RX ADMIN — ACETAMINOPHEN 650 MG: 325 TABLET ORAL at 14:36

## 2023-12-24 RX ADMIN — ACETAMINOPHEN 650 MG: 325 TABLET ORAL at 20:37

## 2023-12-24 RX ADMIN — Medication 10 ML: at 20:29

## 2023-12-24 RX ADMIN — Medication 1 CAPSULE: at 10:58

## 2023-12-24 RX ADMIN — Medication 250 MG: at 14:35

## 2023-12-24 RX ADMIN — ENOXAPARIN SODIUM 40 MG: 100 INJECTION SUBCUTANEOUS at 10:58

## 2023-12-24 RX ADMIN — MEROPENEM 1000 MG: 1 INJECTION, POWDER, FOR SOLUTION INTRAVENOUS at 14:35

## 2023-12-24 RX ADMIN — MEROPENEM 1000 MG: 1 INJECTION, POWDER, FOR SOLUTION INTRAVENOUS at 20:31

## 2023-12-24 RX ADMIN — SODIUM CHLORIDE: 9 INJECTION, SOLUTION INTRAVENOUS at 14:32

## 2023-12-24 RX ADMIN — MEROPENEM 1000 MG: 1 INJECTION, POWDER, FOR SOLUTION INTRAVENOUS at 04:46

## 2023-12-24 RX ADMIN — LEVOTHYROXINE SODIUM 112 MCG: 0.11 TABLET ORAL at 06:29

## 2023-12-24 RX ADMIN — FLUTICASONE PROPIONATE 1 SPRAY: 50 SPRAY, METERED NASAL at 10:59

## 2023-12-24 NOTE — PLAN OF CARE
Problem: Discharge Planning  Goal: Discharge to home or other facility with appropriate resources  Outcome: Progressing     Problem: Pain  Goal: Verbalizes/displays adequate comfort level or baseline comfort level  Outcome: Progressing  Flowsheets (Taken 12/24/2023 1045)  Verbalizes/displays adequate comfort level or baseline comfort level: Encourage patient to monitor pain and request assistance     Problem: Skin/Tissue Integrity  Goal: Absence of new skin breakdown  Description: 1. Monitor for areas of redness and/or skin breakdown  2. Assess vascular access sites hourly  3. Every 4-6 hours minimum:  Change oxygen saturation probe site  4. Every 4-6 hours:  If on nasal continuous positive airway pressure, respiratory therapy assess nares and determine need for appliance change or resting period.   Outcome: Progressing

## 2023-12-25 PROCEDURE — 6370000000 HC RX 637 (ALT 250 FOR IP): Performed by: INTERNAL MEDICINE

## 2023-12-25 PROCEDURE — 2580000003 HC RX 258: Performed by: INTERNAL MEDICINE

## 2023-12-25 PROCEDURE — C1751 CATH, INF, PER/CENT/MIDLINE: HCPCS

## 2023-12-25 PROCEDURE — 6360000002 HC RX W HCPCS: Performed by: STUDENT IN AN ORGANIZED HEALTH CARE EDUCATION/TRAINING PROGRAM

## 2023-12-25 PROCEDURE — 1200000000 HC SEMI PRIVATE

## 2023-12-25 PROCEDURE — 6370000000 HC RX 637 (ALT 250 FOR IP): Performed by: STUDENT IN AN ORGANIZED HEALTH CARE EDUCATION/TRAINING PROGRAM

## 2023-12-25 PROCEDURE — 2500000003 HC RX 250 WO HCPCS: Performed by: INTERNAL MEDICINE

## 2023-12-25 PROCEDURE — 36569 INSJ PICC 5 YR+ W/O IMAGING: CPT

## 2023-12-25 PROCEDURE — 2580000003 HC RX 258: Performed by: STUDENT IN AN ORGANIZED HEALTH CARE EDUCATION/TRAINING PROGRAM

## 2023-12-25 PROCEDURE — 02HV33Z INSERTION OF INFUSION DEVICE INTO SUPERIOR VENA CAVA, PERCUTANEOUS APPROACH: ICD-10-PCS | Performed by: STUDENT IN AN ORGANIZED HEALTH CARE EDUCATION/TRAINING PROGRAM

## 2023-12-25 RX ADMIN — LIDOCAINE HYDROCHLORIDE 5 ML: 10 INJECTION, SOLUTION EPIDURAL; INFILTRATION; INTRACAUDAL; PERINEURAL at 13:30

## 2023-12-25 RX ADMIN — ONDANSETRON 4 MG: 2 INJECTION INTRAMUSCULAR; INTRAVENOUS at 13:38

## 2023-12-25 RX ADMIN — Medication 10 ML: at 20:43

## 2023-12-25 RX ADMIN — LEVOTHYROXINE SODIUM 112 MCG: 0.11 TABLET ORAL at 06:25

## 2023-12-25 RX ADMIN — Medication 250 MG: at 20:32

## 2023-12-25 RX ADMIN — ONDANSETRON 4 MG: 2 INJECTION INTRAMUSCULAR; INTRAVENOUS at 20:33

## 2023-12-25 RX ADMIN — PANTOPRAZOLE SODIUM 40 MG: 40 TABLET, DELAYED RELEASE ORAL at 06:25

## 2023-12-25 RX ADMIN — Medication 1 CAPSULE: at 08:11

## 2023-12-25 RX ADMIN — MEROPENEM 1000 MG: 1 INJECTION, POWDER, FOR SOLUTION INTRAVENOUS at 04:50

## 2023-12-25 RX ADMIN — Medication 10 ML: at 20:44

## 2023-12-25 RX ADMIN — FLUTICASONE PROPIONATE 1 SPRAY: 50 SPRAY, METERED NASAL at 08:13

## 2023-12-25 RX ADMIN — MEROPENEM 1000 MG: 1 INJECTION, POWDER, FOR SOLUTION INTRAVENOUS at 12:14

## 2023-12-25 RX ADMIN — Medication 250 MG: at 08:12

## 2023-12-25 RX ADMIN — MEROPENEM 1000 MG: 1 INJECTION, POWDER, FOR SOLUTION INTRAVENOUS at 20:42

## 2023-12-25 RX ADMIN — ACETAMINOPHEN 650 MG: 325 TABLET ORAL at 20:32

## 2023-12-25 RX ADMIN — ENOXAPARIN SODIUM 40 MG: 100 INJECTION SUBCUTANEOUS at 08:11

## 2023-12-25 NOTE — CONSULTS
PICC line education:    -Risks  -Benefits  -Alternatives  -Procedure    Discussed the above with patient, verbalized understanding, answered all questions. Provided with information on PICC care to review. PICC tip verified via 3CG (Ok to use). Reported off to patient's  Nurse Case Buchanan.
either atelectasis or pneumonia. There   is questionable blunting of the left costophrenic angle which could be due to   pleural fluid or pleural thickening             Outside records:    Labs, Microbiology, Radiology and pertinent results from Care everywhere, if available, were reviewed as a part ofthe consultation. 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 Although every effort was made to ensure the accuracy of this automated transcription, some errors in transcription may have occurred inadvertently. If you may need any clarification, please do not hesitate to contact me through EPIC or at the phone number provided below with my electronic signature. Any pictures or media included in this note were obtained after taking informed verbal consent from the patient and with their approval to include those in the patient's medical record. Reymundo Taylor MD, MPH, FACP, FIDSA  12/21/2023, 11:06 AM  [facility] Infectious Disease   [facility] [address] ([address] [place] [address])  [facility], [address] [facility]  Office: [phone]  Fax: [phone]  In-person Clinic days:  Tuesday & Thursday a.m. Virtual clinic days: Monday, Wednesday & Friday a.m.

## 2023-12-26 ENCOUNTER — TELEPHONE (OUTPATIENT)
Dept: INTERNAL MEDICINE CLINIC | Age: 58
End: 2023-12-26

## 2023-12-26 VITALS
TEMPERATURE: 98.8 F | SYSTOLIC BLOOD PRESSURE: 114 MMHG | HEIGHT: 62 IN | OXYGEN SATURATION: 93 % | DIASTOLIC BLOOD PRESSURE: 65 MMHG | BODY MASS INDEX: 40.77 KG/M2 | WEIGHT: 221.56 LBS | RESPIRATION RATE: 18 BRPM | HEART RATE: 99 BPM

## 2023-12-26 LAB
BACTERIA BLD CULT ORG #2: NORMAL
BACTERIA BLD CULT: NORMAL

## 2023-12-26 PROCEDURE — 6370000000 HC RX 637 (ALT 250 FOR IP): Performed by: INTERNAL MEDICINE

## 2023-12-26 PROCEDURE — 99232 SBSQ HOSP IP/OBS MODERATE 35: CPT | Performed by: INTERNAL MEDICINE

## 2023-12-26 PROCEDURE — 2580000003 HC RX 258: Performed by: INTERNAL MEDICINE

## 2023-12-26 PROCEDURE — 6360000002 HC RX W HCPCS: Performed by: STUDENT IN AN ORGANIZED HEALTH CARE EDUCATION/TRAINING PROGRAM

## 2023-12-26 PROCEDURE — 6370000000 HC RX 637 (ALT 250 FOR IP): Performed by: STUDENT IN AN ORGANIZED HEALTH CARE EDUCATION/TRAINING PROGRAM

## 2023-12-26 PROCEDURE — 2580000003 HC RX 258: Performed by: STUDENT IN AN ORGANIZED HEALTH CARE EDUCATION/TRAINING PROGRAM

## 2023-12-26 PROCEDURE — 6360000002 HC RX W HCPCS: Performed by: INTERNAL MEDICINE

## 2023-12-26 RX ORDER — LACTOBACILLUS RHAMNOSUS GG 10B CELL
1 CAPSULE ORAL 2 TIMES DAILY WITH MEALS
Qty: 60 CAPSULE | Refills: 0 | Status: SHIPPED | OUTPATIENT
Start: 2023-12-26 | End: 2024-01-25

## 2023-12-26 RX ORDER — ONDANSETRON 4 MG/1
4 TABLET, FILM COATED ORAL 3 TIMES DAILY PRN
Qty: 15 TABLET | Refills: 0 | Status: SHIPPED | OUTPATIENT
Start: 2023-12-26

## 2023-12-26 RX ORDER — VANCOMYCIN HYDROCHLORIDE 250 MG/1
250 CAPSULE ORAL 2 TIMES DAILY
Qty: 20 CAPSULE | Refills: 0 | Status: SHIPPED | OUTPATIENT
Start: 2023-12-26 | End: 2024-01-05

## 2023-12-26 RX ORDER — VANCOMYCIN HYDROCHLORIDE 50 MG/ML
250 KIT ORAL EVERY 12 HOURS SCHEDULED
Qty: 140 ML | Refills: 0 | Status: SHIPPED | OUTPATIENT
Start: 2023-12-26 | End: 2023-12-26 | Stop reason: HOSPADM

## 2023-12-26 RX ADMIN — FLUTICASONE PROPIONATE 1 SPRAY: 50 SPRAY, METERED NASAL at 08:51

## 2023-12-26 RX ADMIN — LEVOTHYROXINE SODIUM 112 MCG: 0.11 TABLET ORAL at 05:26

## 2023-12-26 RX ADMIN — ONDANSETRON 4 MG: 2 INJECTION INTRAMUSCULAR; INTRAVENOUS at 11:35

## 2023-12-26 RX ADMIN — PANTOPRAZOLE SODIUM 40 MG: 40 TABLET, DELAYED RELEASE ORAL at 05:27

## 2023-12-26 RX ADMIN — ERTAPENEM SODIUM 1000 MG: 1 INJECTION INTRAMUSCULAR; INTRAVENOUS at 09:41

## 2023-12-26 RX ADMIN — Medication 10 ML: at 08:57

## 2023-12-26 RX ADMIN — MEROPENEM 1000 MG: 1 INJECTION, POWDER, FOR SOLUTION INTRAVENOUS at 04:43

## 2023-12-26 RX ADMIN — Medication 1 CAPSULE: at 08:51

## 2023-12-26 RX ADMIN — ACETAMINOPHEN 650 MG: 325 TABLET ORAL at 04:35

## 2023-12-26 RX ADMIN — ENOXAPARIN SODIUM 40 MG: 100 INJECTION SUBCUTANEOUS at 08:55

## 2023-12-26 RX ADMIN — ONDANSETRON 4 MG: 2 INJECTION INTRAMUSCULAR; INTRAVENOUS at 04:37

## 2023-12-26 RX ADMIN — Medication 250 MG: at 08:52

## 2023-12-26 NOTE — PLAN OF CARE
Problem: Discharge Planning  Goal: Discharge to home or other facility with appropriate resources  Outcome: Completed     Problem: Pain  Goal: Verbalizes/displays adequate comfort level or baseline comfort level  Outcome: Completed     Problem: Skin/Tissue Integrity  Goal: Absence of new skin breakdown  Description: 1. Monitor for areas of redness and/or skin breakdown  2. Assess vascular access sites hourly  3. Every 4-6 hours minimum:  Change oxygen saturation probe site  4. Every 4-6 hours:  If on nasal continuous positive airway pressure, respiratory therapy assess nares and determine need for appliance change or resting period.   Outcome: Completed     Problem: Nutrition Deficit:  Goal: Optimize nutritional status  Outcome: Completed

## 2023-12-26 NOTE — CARE COORDINATION
.Discharge Planning Note:    Chart reviewed and pt has possible IV medication needs upon discharge. SW/CM contacted Rossy with [employer] requesting home infusion benefits check. Rossy to report back with benefit allowances.        Electronically signed by Collette Lord RN on 12/26/2023 at 11:16 AM
[address]  [address]  Phone: [phone]  Fax: [phone]     Call to [facility] to check on status of benefits.  She also states will call [address], [address]  Phone: [phone]  Fax: [phone]     Electronically signed by Maritza Dunne RN on 12/26/2023 at 10:18 AM
Diagnosis: ESBL E. coli UTI and bacteremia       Organism/ culture: ESBL E. coli     Name and dose of Antimicrobial: IV ertapenem 1 g every 24 hours, p.o. vancomycin capsule 250 mg every 12 hours for C. difficile patient     Antimicrobial start date: calculated from December 23, 2023     Antimicrobial completion date planned: January 3, 2024     Lab monitoring: CBC, Chem 12 once a week, to be       collected every Monday or Tuesday morning until the patient is off IV  antibiotics. Fax weekly lab results to Reymundo Taylor MD's office at        [phone]. Please maintain PICC line until the patient is on IV antibiotics. Ok to administer PICC line normal saline flushes as needed. The patient has given verbal informed consent for [facility] ID pharmacist, Avril Mar to manage above antibiotic therapy for the patient after the patient's discharge from the hospital.       ** Please notify Reymundo Taylor MD's office with any change in patient's        status, transfer out of facility or to hospital by calling [phone]           Outpatient Follow up:  No routine outpatient ID f/u needed at this time, if the patient continues to do well. If need arises, a f/u in-person or video visit can be arranged via my office at patient's request on as-needed basis. Physician Signature:  Electronically signed by Reymundo Taylor MD on                                                      12/22/23 at 3:09 PM EST          Called [phone] to [address] [place] [address] pharmacist Karina Nagel and gave verbal order. Patient had 1st dose of the anti-biotic today.     Electronically signed by Jaky Reid RN on 12/26/2023 at 11:28 AM
Discharge Planning. The SW has been made aware by the MD that the pt will be discharging today and will need IV abx at discharge. No IV abx benefits have been ran yet. The SW called [employer] infusion company who informed the SW there is no intake currently in to run the pt's IV abx until Tuesday. [employer] stated everything will be reviewed on 12/26/2023. The SW provided [employer] with all the pt's information and IV abx info in review. The pt is unable to discharge at this time unit IV abx have been confirmed and delivered to the pt's home.     Electronically signed by UDAY Barajas on 12/24/2023 at 12:18 PM
Discharge Planning. The SW spoke with the pt and her significant other regarding the IV abx. The SW informed the pt that her IV abx benefits can not be ran till Tuesday will be be unable to discharge until then. The pt stated she was told that she would not be discharging on IV abx. The pt stated if she does need IV abx she does not want to sit into the hospital till Tuesday. The MARY perfect served the MD with the information asked if he would speak with the pt and her spouse regarding her abx. .     Electronically signed by UDAY Hough on 12/24/2023 at 1:05 PM
[employer] has checked home IV benefits and are as follows:    Patient is covered at $ 19 per week including supplies. Home health provider is     [facility]  [address] [facility] Randie Hamman [place], [address]  Phone: [phone]  Fax: [phone] states she spoke with Renetta Gregg and arranged care. She states she spoke with patient and updated on POC and cost.    No other needs.     Electronically signed by Joe Gaines RN on 12/26/2023 at 11:19 AM
PS Dr. Danielle Mckay    \"Is the plan still for the patient to go home on IV ertapenem 1 g every 24 hours, or not? If so, she needs a dose prior to discharge. The [facility] [address] agency does not want a reaction with a first dose. I will check benefits again with whichever she will be going home on. Her PICC line was placed 12-25. \"    Will wait for his response.     Electronically signed by Maral Estrada RN on 12/26/2023 at 7:55 AM
Ps'ed Dr Lou Gastelum to reach out to Dr. Alia Brown for pt's discharge POC. 's NP is not available at this time. Will wait for a response.     Electronically signed by Jaky Reid RN on 12/26/2023 at 10:04 AM
discharge: Outpatient IV            Potential DME:  none  Patient expects to discharge to: House  Plan for transportation at discharge:  spouse    Financial    Payor: BCBS / Plan: [place] / Product Type: *No Product type* /     Does insurance require precert for SNF: Yes    Potential assistance Purchasing Medications: No  Meds-to-Beds request: Yes      [address] [facility] [address] [facility], [address] [facility] [phone] [place]alona [phone]  [address]  [address] [address]  Phone: [phone] Fax: [phone]    [facility] ([address] [place] [address]) - [address] [phone]  F [phone]  [facility]  [address] [facility] [address]  Phone: [phone] Fax: [phone]      Notes:    Factors facilitating achievement of predicted outcomes: Family support, Cooperative, and Pleasant    Barriers to discharge: none    Additional Case Management Notes:   Lives at home with spouse. Works and is functional in all ADL's. Will require IV anti-biotic at discharge. [employer] contacted. The Plan for Transition of Care is related to the following treatment goals of Hypokalemia [E87.6]  Hypomagnesemia [E83.42]  Pyelonephritis [N12]  Sepsis ([address]) [A41.9]  Severe sepsis ([address]) [A41.9, I29.73]    IF APPLICABLE: The Patient and/or patient representative [facility] and her family were provided with a choice of provider and agrees with the discharge plan.  Freedom of choice list with basic dialogue that supports the patient's individualized plan of care/goals and shares the quality data associated with the providers was provided to:     Patient Representative Name:       The Patient and/or Patient Representative Agree with the Discharge Plan?  yes    Delia Rust RN  Case Management Department  Electronically signed by Delia Rust RN on 12/26/2023 at 11:14 AM

## 2023-12-27 ENCOUNTER — CLINICAL DOCUMENTATION (OUTPATIENT)
Dept: INFECTIOUS DISEASES | Age: 58
End: 2023-12-27

## 2023-12-27 ENCOUNTER — TELEPHONE (OUTPATIENT)
Dept: INFECTIOUS DISEASES | Age: 58
End: 2023-12-27

## 2023-12-27 ENCOUNTER — CARE COORDINATION (OUTPATIENT)
Dept: CASE MANAGEMENT | Age: 58
End: 2023-12-27

## 2023-12-27 DIAGNOSIS — A41.50 GRAM NEGATIVE SEPSIS (HCC): Primary | ICD-10-CM

## 2023-12-27 DIAGNOSIS — R78.81 E COLI BACTEREMIA: Primary | ICD-10-CM

## 2023-12-27 DIAGNOSIS — B96.20 E COLI BACTEREMIA: Primary | ICD-10-CM

## 2023-12-27 NOTE — CARE COORDINATION
Care Transitions Initial Follow Up Call    Call within 2 business days of discharge: Yes    Patient Current Location:  Home: [address] [place] [address]    Care Transition Nurse contacted the patient by telephone to perform post hospital discharge assessment. Verified name and  with patient as identifiers. Provided introduction to self, and explanation of the Care Transition Nurse role. Patient: Gustavo Bledsoe Patient : 1965   MRN: [id number]  Reason for Admission:   Discharge Date: 23 RARS: Readmission Risk Score: 6.3      Last Discharge Facility       Date Complaint Diagnosis Description Type Department Provider    23 Generalized Body Aches Pyelonephritis . .. ED to Hosp-Admission (Discharged) (ADMITTED) [facility] Vadim Soliman MD; Gregory Rick. .. Was this an external facility discharge? No Discharge Facility:     Challenges to be reviewed by the provider   Additional needs identified to be addressed with provider: No  none               Method of communication with provider: none. Pt states doing much better, no issues or concerns. [facility]. nurse has been out to administer her IV antibiotics. F/U appts listed below. Agreed to more CTC f/u calls. Care Transition Nurse reviewed discharge instructions with patient who verbalized understanding. The patient was given an opportunity to ask questions and does not have any further questions or concerns at this time. Were discharge instructions available to patient? No. Reviewed appropriate site of care based on symptoms and resources available to patient including: When to call 911. The patient agrees to contact the PCP office for questions related to their healthcare. Advance Care Planning:   Does patient have an Advance Directive: reviewed and current. Medication reconciliation was performed with patient, who verbalizes understanding of administration of home medications.  Medications reviewed, 1111F entered:

## 2023-12-27 NOTE — TELEPHONE ENCOUNTER
Spoke with Cheri Vegas, pharmacist at [address] [place] [address] and [address] [place] [address] with QLS  verified all OPAT orders ertapenem 1 g every 24 hours Antimicrobial completion date planned: January 3, 2024 and CBC, Chem 12 once a week.   She v/u

## 2023-12-27 NOTE — TELEPHONE ENCOUNTER
Care Transitions Initial Follow Up Call    Outreach made within 2 business days of discharge: Yes    Patient: Elvia Aviles Patient : 1965   MRN: [id number]  Reason for Admission: There are no discharge diagnoses documented for the most recent discharge. Discharge Date: 23       Spoke with: Porsche Vergara     Discharge department/facility: [facility]    TCM Interactive Patient Contact:  Was patient able to fill all prescriptions: Yes  Was patient instructed to bring all medications to the follow-up visit: Yes  Is patient taking all medications as directed in the discharge summary?  Yes  Does patient understand their discharge instructions: Yes  Does patient have questions or concerns that need addressed prior to 7-14 day follow up office visit: no    Scheduled appointment with PCP within 7-14 days    Follow Up  Future Appointments   Date Time Provider [address] [place] [address]   2024 11:30 AM Noris Yanez MD [facility] [address] [place] [address] [place]   2024  2:00 PM Noris Yanez MD [address] [place] [address]       Rachel Martinez MA

## 2024-01-02 ENCOUNTER — TELEPHONE (OUTPATIENT)
Dept: INFECTIOUS DISEASES | Age: 59
End: 2024-01-02

## 2024-01-02 ENCOUNTER — HOSPITAL ENCOUNTER (OUTPATIENT)
Age: 59
Setting detail: SPECIMEN
Discharge: HOME | End: 2024-01-02
Payer: COMMERCIAL

## 2024-01-02 LAB
ALBUMIN SERPL-MCNC: 3.8 G/DL (ref 3.4–5)
ALBUMIN/GLOB SERPL: 1.2 {RATIO} (ref 1.1–2.2)
ALP SERPL-CCNC: 110 U/L (ref 40–129)
ALT SERPL-CCNC: 41 U/L (ref 10–40)
ANION GAP SERPL CALCULATED.3IONS-SCNC: 7 MMOL/L (ref 3–16)
AST SERPL-CCNC: 43 U/L (ref 15–37)
BASOPHILS # BLD: 0.2 K/UL (ref 0–0.2)
BASOPHILS NFR BLD: 2.8 %
BILIRUB SERPL-MCNC: <0.2 MG/DL (ref 0–1)
BUN SERPL-MCNC: 5 MG/DL (ref 7–20)
CALCIUM SERPL-MCNC: 9.6 MG/DL (ref 8.3–10.6)
CHLORIDE SERPL-SCNC: 103 MMOL/L (ref 99–110)
CO2 SERPL-SCNC: 31 MMOL/L (ref 21–32)
CREAT SERPL-MCNC: 0.7 MG/DL (ref 0.6–1.1)
DEPRECATED RDW RBC AUTO: 13.4 % (ref 12.4–15.4)
EOSINOPHIL # BLD: 0.1 K/UL (ref 0–0.6)
EOSINOPHIL NFR BLD: 2.1 %
GFR SERPLBLD CREATININE-BSD FMLA CKD-EPI: >60 ML/MIN/{1.73_M2}
GLUCOSE SERPL-MCNC: 107 MG/DL (ref 70–99)
HCT VFR BLD AUTO: 36.4 % (ref 36–48)
HGB BLD-MCNC: 12.6 G/DL (ref 12–16)
LYMPHOCYTES # BLD: 2.4 K/UL (ref 1–5.1)
LYMPHOCYTES NFR BLD: 37.8 %
MCH RBC QN AUTO: 32.7 PG (ref 26–34)
MCHC RBC AUTO-ENTMCNC: 34.7 G/DL (ref 31–36)
MCV RBC AUTO: 94.1 FL (ref 80–100)
MONOCYTES # BLD: 0.6 K/UL (ref 0–1.3)
MONOCYTES NFR BLD: 10 %
NEUTROPHILS # BLD: 3.1 K/UL (ref 1.7–7.7)
NEUTROPHILS NFR BLD: 47.3 %
PLATELET # BLD AUTO: 485 K/UL (ref 135–450)
PMV BLD AUTO: 8 FL (ref 5–10.5)
POTASSIUM SERPL-SCNC: 4 MMOL/L (ref 3.5–5.1)
PROT SERPL-MCNC: 6.9 G/DL (ref 6.4–8.2)
RBC # BLD AUTO: 3.87 M/UL (ref 4–5.2)
SODIUM SERPL-SCNC: 141 MMOL/L (ref 136–145)
WBC # BLD AUTO: 6.4 K/UL (ref 4–11)

## 2024-01-02 PROCEDURE — 85025 COMPLETE CBC W/AUTO DIFF WBC: CPT

## 2024-01-02 PROCEDURE — 36415 COLL VENOUS BLD VENIPUNCTURE: CPT

## 2024-01-02 PROCEDURE — 80053 COMPREHEN METABOLIC PANEL: CPT

## 2024-01-02 NOTE — TELEPHONE ENCOUNTER
Lab results received. LM requesting return call to assess symptoms as term date is tomorrow, office contact information provided

## 2024-01-02 NOTE — TELEPHONE ENCOUNTER
Reviewed labs and case.  Okay to end IV abx as planned.    Please encourage proper hygiene, wiping front to back, complete voiding when urinating etc to reduce chance of UTI in future.    If recurrence occurs, may need to see urology.     Deyanira PerkinsD, [facility]  Clinical Pharmacy Specialist- [facility] Infectious Disease  Phone: [phone] (also available on [employer])  Fax: [phone]    For Pharmacy Admin Tracking Only    Program: Medical Group  CPA in place: Yes  Intervention Detail: Discontinued Rx: 1, reason: Therapy Complete  Time Spent (min): 10

## 2024-01-02 NOTE — TELEPHONE ENCOUNTER
OPAT Nurse Coordinator Weekly Update Note    Current OPAT plan:  IV ertapenem 1 g every 24 hours, p.o. vancomycin capsule 250 mg every 12 hours for C. difficile patient   Tentative end date: 1/3/24    Diagnosis:   ESBL E. coli UTI and bacteremia     Assessment:  Spoke with patient who states she is feeling better. Afebrile and denies urinary symptoms at present. Discussed POC and will call patient back after term decision is made and she v/u    Follow up appts:

## 2024-01-02 NOTE — TELEPHONE ENCOUNTER
OPAT End    Call made to pharmacy: [employer] spoke with Pili    Call made to [facility]: [facility] spoke with Kayla    Call made to patient: She v/u and agrees with POC    Will f/u in 1-2 days to verify line removal

## 2024-01-03 ENCOUNTER — CARE COORDINATION (OUTPATIENT)
Dept: CASE MANAGEMENT | Age: 59
End: 2024-01-03

## 2024-01-03 NOTE — CARE COORDINATION
Care Transitions Follow Up Call    Patient Current Location:  Home: [address] [place] [address] Care Coordinator contacted the patient by telephone to follow up after admission on .  Verified name and  with patient as identifiers.    Patient: Yuly Barlow  Patient : 1965   MRN: [id number]  Reason for Admission: [facility]; Sepsis; IV antibx  Discharge Date: 23 RARS: Readmission Risk Score: 6.3      Needs to be reviewed by the provider   Additional needs identified to be addressed with provider: No  none             Method of communication with provider: none.    Spoke with Yuly Barlow who reported that she is doing good. Patient stated that today is her last dose of IV ATB. She stated that the [facility] will call today to schedule a time to come in to remove her PICC line. Patient stated that she has a follow up with PCP tomorrow 24.  Patient denied cp, sob, cough, dizziness, headache, n/v, diarrhea, abdominal pains, fever, or chills. Patient report that appetite and fluid intake is good and denied any problems with bowel or bladder. Patient reported that she is taking all medications as ordered.  Patient denied any other needs at this time. Patient instructed to continue to monitor s/s, reporting any that may present to MD immediately for early intervention.  Patient is agreeable to f/u calls.     Addressed changes since last contact:   Complete IV ATB today   Discussed follow-up appointments. If no appointment was previously scheduled, appointment scheduling offered: Yes.   Is follow up appointment scheduled within 7 days of discharge? No.    Follow Up  Future Appointments   Date Time Provider Department Center   2024 11:30 AM Nkechi Bergman MD [facility] [place] - [facility]   2024  2:00 PM Nkechi Bergman MD [facility] [place] - [facility]     External follow up appointment(s): na    LPN Care Coordinator reviewed medical action plan with patient and

## 2024-01-04 ENCOUNTER — OFFICE VISIT (OUTPATIENT)
Dept: INTERNAL MEDICINE CLINIC | Age: 59
End: 2024-01-04

## 2024-01-04 VITALS
DIASTOLIC BLOOD PRESSURE: 70 MMHG | SYSTOLIC BLOOD PRESSURE: 136 MMHG | WEIGHT: 225.8 LBS | HEIGHT: 62 IN | BODY MASS INDEX: 41.55 KG/M2 | OXYGEN SATURATION: 96 % | HEART RATE: 86 BPM

## 2024-01-04 DIAGNOSIS — N12 PYELONEPHRITIS: ICD-10-CM

## 2024-01-04 DIAGNOSIS — A41.50 GRAM NEGATIVE SEPSIS (HCC): ICD-10-CM

## 2024-01-04 DIAGNOSIS — Z09 HOSPITAL DISCHARGE FOLLOW-UP: Primary | ICD-10-CM

## 2024-01-04 DIAGNOSIS — N39.0 RECURRENT UTI: ICD-10-CM

## 2024-01-04 DIAGNOSIS — A49.9 ESBL (EXTENDED SPECTRUM BETA-LACTAMASE) PRODUCING BACTERIA INFECTION: ICD-10-CM

## 2024-01-04 DIAGNOSIS — Z16.12 ESBL (EXTENDED SPECTRUM BETA-LACTAMASE) PRODUCING BACTERIA INFECTION: ICD-10-CM

## 2024-01-04 PROBLEM — B96.20 E COLI BACTEREMIA: Status: RESOLVED | Noted: 2023-12-21 | Resolved: 2024-01-04

## 2024-01-04 PROBLEM — R78.81 E COLI BACTEREMIA: Status: RESOLVED | Noted: 2023-12-21 | Resolved: 2024-01-04

## 2024-01-04 LAB
BILIRUB UR QL STRIP.AUTO: NEGATIVE
CLARITY UR: CLEAR
COLOR UR: YELLOW
GLUCOSE UR STRIP.AUTO-MCNC: NEGATIVE MG/DL
HGB UR QL STRIP.AUTO: NEGATIVE
KETONES UR STRIP.AUTO-MCNC: NEGATIVE MG/DL
LEUKOCYTE ESTERASE UR QL STRIP.AUTO: NEGATIVE
NITRITE UR QL STRIP.AUTO: NEGATIVE
PH UR STRIP.AUTO: 6 [PH] (ref 5–8)
PROT UR STRIP.AUTO-MCNC: NEGATIVE MG/DL
SP GR UR STRIP.AUTO: 1.02 (ref 1–1.03)
UA COMPLETE W REFLEX CULTURE PNL UR: NORMAL
UA DIPSTICK W REFLEX MICRO PNL UR: NORMAL
URN SPEC COLLECT METH UR: NORMAL
UROBILINOGEN UR STRIP-ACNC: 0.2 E.U./DL

## 2024-01-04 RX ORDER — POLYETHYLENE GLYCOL 3350 17 G/17G
17 POWDER, FOR SOLUTION ORAL 2 TIMES DAILY
COMMUNITY
Start: 2014-04-01

## 2024-01-04 RX ORDER — GARLIC EXTRACT 500 MG
CAPSULE ORAL
COMMUNITY
Start: 2023-12-26 | End: 2024-01-04

## 2024-01-04 RX ORDER — FLUCONAZOLE 150 MG/1
150 TABLET ORAL ONCE
Qty: 1 TABLET | Refills: 0 | Status: SHIPPED | OUTPATIENT
Start: 2024-01-04 | End: 2024-01-04

## 2024-01-04 NOTE — PROGRESS NOTES
Post-Discharge Transitional Care Follow Up      Yuly Barlow   YOB: 1965    Date of Office Visit:  1/4/2024  Date of Hospital Admission: 12/20/23  Date of Hospital Discharge: 12/26/23  Readmission Risk Score (high >=14%. Medium >=10%):Readmission Risk Score: 6.3      Care management risk score Rising risk (score 2-5) and Complex Care (Scores >=6): No Risk Score On File     Non face to face  following discharge, date last encounter closed (first attempt may have been earlier): 12/27/2023     Call initiated 2 business days of discharge: Yes     1. Hospital discharge follow-up  - NH DISCHARGE MEDS RECONCILED W/ CURRENT OUTPATIENT MED LIST    2. Pyelonephritis  Will get follow-up urinalysis and urine culture.  Will collect the first 1 today.    3. ESBL (extended spectrum beta-lactamase) producing bacteria infection  Finishing IV antibiotics.  Being treated by infectious disease.  Avoid unnecessary antibiotics for viruses.  - Urinalysis with Reflex to Culture; Standing    4. Recurrent UTI  Discussed with patient to check a urine as soon as she feels symptoms.  - Urinalysis with Reflex to Culture; Standing    5. Gram negative sepsis (HCC)  This seems to have been treated and now resolved.        Medical Decision Making: high complexity  No follow-ups on file.    On this date 1/4/2024 I have spent 30 minutes reviewing previous notes, test results and face to face with the patient discussing the diagnosis and importance of compliance with the treatment plan as well as documenting on the day of the visit.       Subjective:   HPI  Started with vomiting for a couple of days before she was admitted.  Told at ER virus and given nausea med and sent home.  Then she started with night sweats and feeling bad and then started to throwing up again and have life squad come and get her due to weakness, and passed out when they got her out of the house.     She did read my Lessons Only message about repeating her

## 2024-01-10 ENCOUNTER — CARE COORDINATION (OUTPATIENT)
Dept: CASE MANAGEMENT | Age: 59
End: 2024-01-10

## 2024-01-10 NOTE — CARE COORDINATION
Care Transitions Follow Up Call    Patient Current Location:  Home: [address] [place] [address]    LPN Care Coordinator contacted the patient by telephone to follow up after admission on .  Verified name and  with patient as identifiers.    Patient: Yuly Barlow  Patient : 1965   MRN: [id number]  Reason for Admission:  Sepsis; IV antibx         Has ACPs  Discharge Date: 23 RARS: Readmission Risk Score: 6.3      Needs to be reviewed by the provider   Additional needs identified to be addressed with provider: No  none             Method of communication with provider: none.    Spoke with Yuly Barlow who reported that she was doing good. Patient reported that she has completed all IV ATB and the [facility] nurse has pulled her PICC line. [facility] is no longer active in the home. Patient reported that she had a HFU on  and everything went well and no new medications ordered. Patient denied cp, sob, cough, dizziness, headache, n/v, diarrhea, abdominal pains, fever, or chills. Patient report that appetite and fluid intake is good and denied any problems with bowel or bladder. Patient reported that she is taking all medications as ordered. . Patient denied any other needs at this time. Patient instructed to continue to monitor s/s, reporting any that may present to MD immediately for early intervention.  Patient is agreeable to f/u calls.     Addressed changes since last contact:  none  Discussed follow-up appointments. If no appointment was previously scheduled, appointment scheduling offered: Yes.   Is follow up appointment scheduled within 7 days of discharge? Yes.    Follow Up  Future Appointments   Date Time Provider Department Center   2024  2:00 PM Nkechi Bergman MD [facility] [place] [facility]   2024  8:50 AM Selvin Cortes MD [facility]   2024  9:00 AM Jillian Ambrocio RN [facility]     External follow up appointment(s): na    LPN Care Coordinator

## 2024-01-17 ENCOUNTER — CARE COORDINATION (OUTPATIENT)
Dept: CARE COORDINATION | Age: 59
End: 2024-01-17

## 2024-01-17 NOTE — CARE COORDINATION
Care Transitions Outreach Attempt    Attempted to reach patient for transitions of care follow up. Unable to reach patient.    Patient: Yuly Barlow Patient : 1965 MRN: [id number]    Last Discharge Facility       Date Complaint Diagnosis Description Type Department Provider    23 Generalized Body Aches Pyelonephritis ... ED to Hosp-Admission (Discharged) (ADMITTED) [facility] Hayden Saucedo MD; Jevon Boles..          Noted following upcoming appointments from discharge chart review:   [facility] follow up appointment(s):   Future Appointments   Date Time Provider Department Center   2024  2:00 PM Nkechi Bergman MD [facility] [place] [facility]   2024  8:50 AM Selvin Cortes MD [facility]   2024  9:00 AM Jillian Ambrocio RN [facility]     Non-[facility]  follow up appointment(s): NA

## 2024-01-18 DIAGNOSIS — Z16.12 ESBL (EXTENDED SPECTRUM BETA-LACTAMASE) PRODUCING BACTERIA INFECTION: ICD-10-CM

## 2024-01-18 DIAGNOSIS — A49.9 ESBL (EXTENDED SPECTRUM BETA-LACTAMASE) PRODUCING BACTERIA INFECTION: ICD-10-CM

## 2024-01-18 DIAGNOSIS — N39.0 RECURRENT UTI: ICD-10-CM

## 2024-01-19 LAB
BACTERIA URNS QL MICRO: ABNORMAL /HPF
BILIRUB UR QL STRIP.AUTO: NEGATIVE
CLARITY UR: CLEAR
COLOR UR: YELLOW
EPI CELLS #/AREA URNS AUTO: 1 /HPF (ref 0–5)
GLUCOSE UR STRIP.AUTO-MCNC: NEGATIVE MG/DL
HGB UR QL STRIP.AUTO: NEGATIVE
HYALINE CASTS #/AREA URNS AUTO: 0 /LPF (ref 0–8)
KETONES UR STRIP.AUTO-MCNC: NEGATIVE MG/DL
LEUKOCYTE ESTERASE UR QL STRIP.AUTO: ABNORMAL
NITRITE UR QL STRIP.AUTO: NEGATIVE
PH UR STRIP.AUTO: 7 [PH] (ref 5–8)
PROT UR STRIP.AUTO-MCNC: NEGATIVE MG/DL
RBC CLUMPS #/AREA URNS AUTO: 1 /HPF (ref 0–4)
SP GR UR STRIP.AUTO: 1.01 (ref 1–1.03)
UA COMPLETE W REFLEX CULTURE PNL UR: ABNORMAL
UA DIPSTICK W REFLEX MICRO PNL UR: YES
URN SPEC COLLECT METH UR: ABNORMAL
UROBILINOGEN UR STRIP-ACNC: 0.2 E.U./DL
WBC #/AREA URNS AUTO: 6 /HPF (ref 0–5)

## 2024-01-19 NOTE — RESULT ENCOUNTER NOTE
Does not look infected.  Make sure to continue to drink a lot of water and consider taking cranberry tablets.

## 2024-01-22 ENCOUNTER — CARE COORDINATION (OUTPATIENT)
Dept: CASE MANAGEMENT | Age: 59
End: 2024-01-22

## 2024-01-22 NOTE — CARE COORDINATION
Care Transitions Follow Up Call    Patient Current Location:  Home: [address] [place] [address] Care Coordinator contacted the patient by telephone to follow up after admission on 2023.  Verified name and  with patient as identifiers.    Patient: Yuly Barlow  Patient : 1965   MRN: [id number]  Reason for Admission:  [facility]; Sepsis; IV antibx   Discharge Date: 23 RARS: Readmission Risk Score: 6.3      Needs to be reviewed by the provider   Additional needs identified to be addressed with provider: No  none             Method of communication with provider: none.  Patient reports she is doing pretty good. Denies cp, sob, ha, fever, chills, cough, dizziness, nvd or abdominal pain. Taking medication as prescribed. Appetite and fluid intake is good. No urinary or bowel elimination issues. No need or concerns at this time. Informed patient this is the final CTN call. Any medical issues contact PCP. No further outreach scheduled.    Addressed changes since last contact:  none  Discussed follow-up appointments. If no appointment was previously scheduled, appointment scheduling offered: na.   Is follow up appointment scheduled within 7 days of discharge? No.    Follow Up  Future Appointments   Date Time Provider Department Center   2024  2:00 PM Nkechi Bergman MD [facility] [place] [facility]   2024  8:50 AM Selvin Cortes MD [facility]   2024  9:00 AM Jillian Ambrocio RN  [facility]     External follow up appointment(s):     Advance Care Planning:   not on file.     Patients top risk factors for readmission: ineffective coping and medical condition-recent sepsis  Interventions to address risk factors: Education of patient/family/caregiver/guardian to support self-management-     Offered patient enrollment in the Remote Patient Monitoring (RPM) program for in-home monitoring: Patient is not eligible for RPM program.     Care Transitions Subsequent

## 2024-02-01 ENCOUNTER — OFFICE VISIT (OUTPATIENT)
Dept: INTERNAL MEDICINE CLINIC | Age: 59
End: 2024-02-01
Payer: COMMERCIAL

## 2024-02-01 VITALS
BODY MASS INDEX: 40.78 KG/M2 | HEIGHT: 62 IN | HEART RATE: 65 BPM | DIASTOLIC BLOOD PRESSURE: 84 MMHG | WEIGHT: 221.6 LBS | OXYGEN SATURATION: 97 % | SYSTOLIC BLOOD PRESSURE: 120 MMHG

## 2024-02-01 DIAGNOSIS — Z71.85 VACCINE COUNSELING: ICD-10-CM

## 2024-02-01 DIAGNOSIS — A49.9 ESBL (EXTENDED SPECTRUM BETA-LACTAMASE) PRODUCING BACTERIA INFECTION: ICD-10-CM

## 2024-02-01 DIAGNOSIS — Z16.12 ESBL (EXTENDED SPECTRUM BETA-LACTAMASE) PRODUCING BACTERIA INFECTION: ICD-10-CM

## 2024-02-01 DIAGNOSIS — L30.4 INTERTRIGO: ICD-10-CM

## 2024-02-01 DIAGNOSIS — R73.02 IMPAIRED GLUCOSE TOLERANCE: ICD-10-CM

## 2024-02-01 DIAGNOSIS — E66.01 OBESITY, CLASS III, BMI 40-49.9 (MORBID OBESITY) (HCC): ICD-10-CM

## 2024-02-01 DIAGNOSIS — N39.0 RECURRENT UTI: ICD-10-CM

## 2024-02-01 DIAGNOSIS — D33.2 BENIGN NEOPLASM OF BRAIN, UNSPECIFIED BRAIN REGION (HCC): ICD-10-CM

## 2024-02-01 DIAGNOSIS — Z00.00 ANNUAL PHYSICAL EXAM: Primary | ICD-10-CM

## 2024-02-01 PROBLEM — N12 PYELONEPHRITIS: Status: RESOLVED | Noted: 2023-12-20 | Resolved: 2024-02-01

## 2024-02-01 PROBLEM — A41.9 SEPSIS (HCC): Status: RESOLVED | Noted: 2023-12-20 | Resolved: 2024-02-01

## 2024-02-01 PROBLEM — A49.8 INFECTION DUE TO ESBL-PRODUCING ESCHERICHIA COLI: Status: RESOLVED | Noted: 2023-12-21 | Resolved: 2024-02-01

## 2024-02-01 LAB
BASOPHILS # BLD: 0.1 K/UL (ref 0–0.2)
BASOPHILS NFR BLD: 0.8 %
BILIRUB UR QL STRIP.AUTO: NEGATIVE
CLARITY UR: CLEAR
COLOR UR: YELLOW
DEPRECATED RDW RBC AUTO: 13.9 % (ref 12.4–15.4)
EOSINOPHIL # BLD: 0.3 K/UL (ref 0–0.6)
EOSINOPHIL NFR BLD: 4.2 %
GLUCOSE UR STRIP.AUTO-MCNC: NEGATIVE MG/DL
HBV SURFACE AB SERPL IA-ACNC: 11.63 MIU/ML
HCT VFR BLD AUTO: 42.7 % (ref 36–48)
HGB BLD-MCNC: 14.7 G/DL (ref 12–16)
HGB UR QL STRIP.AUTO: NEGATIVE
KETONES UR STRIP.AUTO-MCNC: NEGATIVE MG/DL
LEUKOCYTE ESTERASE UR QL STRIP.AUTO: NEGATIVE
LYMPHOCYTES # BLD: 2.4 K/UL (ref 1–5.1)
LYMPHOCYTES NFR BLD: 31.4 %
MCH RBC QN AUTO: 32.2 PG (ref 26–34)
MCHC RBC AUTO-ENTMCNC: 34.4 G/DL (ref 31–36)
MCV RBC AUTO: 93.7 FL (ref 80–100)
MONOCYTES # BLD: 0.6 K/UL (ref 0–1.3)
MONOCYTES NFR BLD: 7.7 %
NEUTROPHILS # BLD: 4.3 K/UL (ref 1.7–7.7)
NEUTROPHILS NFR BLD: 55.9 %
NITRITE UR QL STRIP.AUTO: NEGATIVE
PH UR STRIP.AUTO: 6 [PH] (ref 5–8)
PLATELET # BLD AUTO: 320 K/UL (ref 135–450)
PMV BLD AUTO: 10.1 FL (ref 5–10.5)
PROT UR STRIP.AUTO-MCNC: NEGATIVE MG/DL
RBC # BLD AUTO: 4.55 M/UL (ref 4–5.2)
SP GR UR STRIP.AUTO: 1.01 (ref 1–1.03)
UA COMPLETE W REFLEX CULTURE PNL UR: NORMAL
UA DIPSTICK W REFLEX MICRO PNL UR: NORMAL
URN SPEC COLLECT METH UR: NORMAL
UROBILINOGEN UR STRIP-ACNC: 0.2 E.U./DL
WBC # BLD AUTO: 7.6 K/UL (ref 4–11)

## 2024-02-01 PROCEDURE — 99396 PREV VISIT EST AGE 40-64: CPT | Performed by: FAMILY MEDICINE

## 2024-02-01 RX ORDER — FLUTICASONE PROPIONATE 50 MCG
SPRAY, SUSPENSION (ML) NASAL
Qty: 16 G | Refills: 12 | Status: SHIPPED | OUTPATIENT
Start: 2024-02-01

## 2024-02-01 RX ORDER — CLOTRIMAZOLE AND BETAMETHASONE DIPROPIONATE 10; .64 MG/G; MG/G
CREAM TOPICAL
Qty: 15 G | Refills: 0 | Status: SHIPPED | OUTPATIENT
Start: 2024-02-01 | End: 2024-02-15

## 2024-02-01 RX ORDER — FLUTICASONE PROPIONATE 50 MCG
SPRAY, SUSPENSION (ML) NASAL
Qty: 48 G | OUTPATIENT
Start: 2024-02-01

## 2024-02-01 RX ORDER — METFORMIN HYDROCHLORIDE 500 MG/1
1000 TABLET, EXTENDED RELEASE ORAL
Qty: 180 TABLET | Refills: 1 | Status: SHIPPED | OUTPATIENT
Start: 2024-02-01

## 2024-02-01 ASSESSMENT — PATIENT HEALTH QUESTIONNAIRE - PHQ9
SUM OF ALL RESPONSES TO PHQ QUESTIONS 1-9: 0
1. LITTLE INTEREST OR PLEASURE IN DOING THINGS: 0
SUM OF ALL RESPONSES TO PHQ QUESTIONS 1-9: 0
2. FEELING DOWN, DEPRESSED OR HOPELESS: 0
SUM OF ALL RESPONSES TO PHQ QUESTIONS 1-9: 0
SUM OF ALL RESPONSES TO PHQ QUESTIONS 1-9: 0
SUM OF ALL RESPONSES TO PHQ9 QUESTIONS 1 & 2: 0

## 2024-02-01 NOTE — PROGRESS NOTES
Chief Complaint   Patient presents with    Annual Exam     Annual, concerns of acid reflux, was in the hospital and was given flonase instead of nasacort and would like to have a prescription if possible.        PREVENTATIVE VISIT AND EXAM      She fasted for 8 hours.    She likes Flonase better than Nasacort.  She would like to get a prescription.  Noticing that she is having intermittent heartburn.  It is not every night.  She stopped eating onions because that was one of her triggers.  Last night she had a Marium cheese steak with peppers but no onions.  She woke up in the middle the night with heartburn and feeling food refluxing up to her throat.  Her throat is sore and irritated from this.    We reviewed preventative and health maintenance issues.  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  She probably had hepatitis B vaccine for this appointment in the past.      Patient Active Problem List   Diagnosis    Allergic rhinitis    Asthma    Hypothyroidism    Hormone replacement therapy    Benign neoplasm of brain, unspecified (HCC)    Pancreatic cyst    Obesity, morbid (HCC)    GERD (gastroesophageal reflux disease)    Impaired glucose tolerance    Vitamin D deficiency    Carpal tunnel syndrome    Ulnar neuropathy of right upper extremity    Sepsis (HCC)    Pyelonephritis    Infection due to ESBL-producing Escherichia coli    Hypokalemia    Hypomagnesemia       Past medical history, social history, family history reviewed or updated.      Outpatient Medications Marked as Taking for the 2/1/24 encounter (Office Visit) with Nkechi Bergman MD   Medication Sig Dispense Refill    ondansetron (ZOFRAN) 4 MG tablet Take 1 tablet by mouth 3 times daily as needed for Nausea or Vomiting 15 tablet 0    esomeprazole (NEXIUM) 40 MG delayed release capsule TAKE 1 CAPSULE BY MOUTH DAILY EVERY DAY BEFORE BREAKFAST 30 capsule 5    cetirizine (ZYRTEC) 10 MG tablet Take 1 tablet by mouth at

## 2024-02-01 NOTE — PATIENT INSTRUCTIONS
Consider uristat or similar bladder infection test strips..    You can take a famotidine at night or right after eating a food that may trigger indigestion.  Maximum daily dose is 40 mg daily.  Rarely 60 mg is OK.      A liquid antacid is acceptable as needed    Vitamin D:  1000 - 2000 units per day.  (25-50 mcg)      Take the metformin at 1 a day for a month and then if tolerated, try to take 2 per day.  It can prevent prediabetes turning into diabetes and some patients that struggle with weight loss may find that they are now able to lose some weight.     Lume or Carpe    TIGI hair wax.

## 2024-02-02 LAB
ALBUMIN SERPL-MCNC: 4.7 G/DL (ref 3.4–5)
ALBUMIN/GLOB SERPL: 1.8 {RATIO} (ref 1.1–2.2)
ALP SERPL-CCNC: 101 U/L (ref 40–129)
ALT SERPL-CCNC: 47 U/L (ref 10–40)
ANION GAP SERPL CALCULATED.3IONS-SCNC: 14 MMOL/L (ref 3–16)
AST SERPL-CCNC: 41 U/L (ref 15–37)
BILIRUB SERPL-MCNC: 0.4 MG/DL (ref 0–1)
BUN SERPL-MCNC: 7 MG/DL (ref 7–20)
CALCIUM SERPL-MCNC: 10.2 MG/DL (ref 8.3–10.6)
CHLORIDE SERPL-SCNC: 100 MMOL/L (ref 99–110)
CHOLEST SERPL-MCNC: 185 MG/DL (ref 0–199)
CO2 SERPL-SCNC: 28 MMOL/L (ref 21–32)
CREAT SERPL-MCNC: 0.7 MG/DL (ref 0.6–1.1)
EST. AVERAGE GLUCOSE BLD GHB EST-MCNC: 122.6 MG/DL
GFR SERPLBLD CREATININE-BSD FMLA CKD-EPI: >60 ML/MIN/{1.73_M2}
GLUCOSE SERPL-MCNC: 105 MG/DL (ref 70–99)
HBA1C MFR BLD: 5.9 %
HDLC SERPL-MCNC: 43 MG/DL (ref 40–60)
LDLC SERPL CALC-MCNC: 100 MG/DL
POTASSIUM SERPL-SCNC: 4.3 MMOL/L (ref 3.5–5.1)
PROT SERPL-MCNC: 7.3 G/DL (ref 6.4–8.2)
SODIUM SERPL-SCNC: 142 MMOL/L (ref 136–145)
TRIGL SERPL-MCNC: 210 MG/DL (ref 0–150)
TSH SERPL DL<=0.005 MIU/L-ACNC: 0.44 UIU/ML (ref 0.27–4.2)
VLDLC SERPL CALC-MCNC: 42 MG/DL

## 2024-03-18 DIAGNOSIS — Z16.12 ESBL (EXTENDED SPECTRUM BETA-LACTAMASE) PRODUCING BACTERIA INFECTION: ICD-10-CM

## 2024-03-18 DIAGNOSIS — N39.0 RECURRENT UTI: ICD-10-CM

## 2024-03-18 DIAGNOSIS — A49.9 ESBL (EXTENDED SPECTRUM BETA-LACTAMASE) PRODUCING BACTERIA INFECTION: ICD-10-CM

## 2024-03-18 LAB
BACTERIA URNS QL MICRO: ABNORMAL /HPF
BILIRUB UR QL STRIP.AUTO: NEGATIVE
CLARITY UR: CLEAR
COLOR UR: YELLOW
EPI CELLS #/AREA URNS AUTO: 2 /HPF (ref 0–5)
GLUCOSE UR STRIP.AUTO-MCNC: NEGATIVE MG/DL
HGB UR QL STRIP.AUTO: NEGATIVE
HYALINE CASTS #/AREA URNS AUTO: 0 /LPF (ref 0–8)
KETONES UR STRIP.AUTO-MCNC: NEGATIVE MG/DL
LEUKOCYTE ESTERASE UR QL STRIP.AUTO: ABNORMAL
NITRITE UR QL STRIP.AUTO: POSITIVE
PH UR STRIP.AUTO: 6 [PH] (ref 5–8)
PROT UR STRIP.AUTO-MCNC: NEGATIVE MG/DL
RBC CLUMPS #/AREA URNS AUTO: 1 /HPF (ref 0–4)
SP GR UR STRIP.AUTO: 1.01 (ref 1–1.03)
UA COMPLETE W REFLEX CULTURE PNL UR: YES
UA DIPSTICK W REFLEX MICRO PNL UR: YES
URN SPEC COLLECT METH UR: ABNORMAL
UROBILINOGEN UR STRIP-ACNC: 0.2 E.U./DL
WBC #/AREA URNS AUTO: 53 /HPF (ref 0–5)

## 2024-03-19 RX ORDER — SULFAMETHOXAZOLE AND TRIMETHOPRIM 800; 160 MG/1; MG/1
1 TABLET ORAL 2 TIMES DAILY
Qty: 14 TABLET | Refills: 0 | Status: SHIPPED | OUTPATIENT
Start: 2024-03-19 | End: 2024-03-20 | Stop reason: ALTCHOICE

## 2024-03-20 DIAGNOSIS — R23.2 HOT FLASHES: ICD-10-CM

## 2024-03-20 LAB
BACTERIA UR CULT: ABNORMAL
ORGANISM: ABNORMAL

## 2024-03-20 RX ORDER — ESTRADIOL 0.5 MG/1
0.5 TABLET ORAL DAILY
Qty: 90 TABLET | Refills: 1 | Status: SHIPPED | OUTPATIENT
Start: 2024-03-20

## 2024-03-20 RX ORDER — CIPROFLOXACIN 500 MG/1
500 TABLET, FILM COATED ORAL 2 TIMES DAILY
Qty: 10 TABLET | Refills: 0 | Status: SHIPPED | OUTPATIENT
Start: 2024-03-20 | End: 2024-03-25

## 2024-03-23 ENCOUNTER — HOSPITAL ENCOUNTER (OUTPATIENT)
Dept: WOMENS IMAGING | Age: 59
Discharge: HOME | End: 2024-03-23
Payer: COMMERCIAL

## 2024-03-23 VITALS — HEIGHT: 62 IN | BODY MASS INDEX: 40.48 KG/M2 | WEIGHT: 220 LBS

## 2024-03-23 DIAGNOSIS — Z12.31 SCREENING MAMMOGRAM FOR BREAST CANCER: ICD-10-CM

## 2024-03-23 PROCEDURE — 77063 BREAST TOMOSYNTHESIS BI: CPT

## 2024-04-11 DIAGNOSIS — N39.0 RECURRENT UTI: ICD-10-CM

## 2024-04-11 DIAGNOSIS — A49.9 ESBL (EXTENDED SPECTRUM BETA-LACTAMASE) PRODUCING BACTERIA INFECTION: ICD-10-CM

## 2024-04-11 DIAGNOSIS — Z16.12 ESBL (EXTENDED SPECTRUM BETA-LACTAMASE) PRODUCING BACTERIA INFECTION: ICD-10-CM

## 2024-05-08 ENCOUNTER — PREP FOR PROCEDURE (OUTPATIENT)
Dept: ORTHOPEDIC SURGERY | Age: 59
End: 2024-05-08

## 2024-05-08 DIAGNOSIS — G56.01 RIGHT CARPAL TUNNEL SYNDROME: ICD-10-CM

## 2024-05-08 DIAGNOSIS — G56.21 CUBITAL TUNNEL SYNDROME ON RIGHT: ICD-10-CM

## 2024-05-09 ENCOUNTER — OFFICE VISIT (OUTPATIENT)
Dept: INTERNAL MEDICINE CLINIC | Age: 59
End: 2024-05-09
Payer: COMMERCIAL

## 2024-05-09 VITALS
DIASTOLIC BLOOD PRESSURE: 84 MMHG | HEIGHT: 62 IN | WEIGHT: 221 LBS | HEART RATE: 72 BPM | BODY MASS INDEX: 40.67 KG/M2 | SYSTOLIC BLOOD PRESSURE: 126 MMHG | OXYGEN SATURATION: 96 %

## 2024-05-09 DIAGNOSIS — A49.9 ESBL (EXTENDED SPECTRUM BETA-LACTAMASE) PRODUCING BACTERIA INFECTION: ICD-10-CM

## 2024-05-09 DIAGNOSIS — Z16.12 ESBL (EXTENDED SPECTRUM BETA-LACTAMASE) PRODUCING BACTERIA INFECTION: ICD-10-CM

## 2024-05-09 DIAGNOSIS — R73.02 IMPAIRED GLUCOSE TOLERANCE: Primary | ICD-10-CM

## 2024-05-09 DIAGNOSIS — B96.20 E COLI BACTEREMIA: ICD-10-CM

## 2024-05-09 DIAGNOSIS — E66.01 OBESITY, MORBID (HCC): ICD-10-CM

## 2024-05-09 DIAGNOSIS — R78.81 E COLI BACTEREMIA: ICD-10-CM

## 2024-05-09 DIAGNOSIS — N39.0 RECURRENT UTI: ICD-10-CM

## 2024-05-09 LAB
ALBUMIN SERPL-MCNC: 4.5 G/DL (ref 3.4–5)
ALBUMIN/GLOB SERPL: 1.6 {RATIO} (ref 1.1–2.2)
ALP SERPL-CCNC: 95 U/L (ref 40–129)
ALT SERPL-CCNC: 60 U/L (ref 10–40)
ANION GAP SERPL CALCULATED.3IONS-SCNC: 9 MMOL/L (ref 3–16)
AST SERPL-CCNC: 50 U/L (ref 15–37)
BASOPHILS # BLD: 0.1 K/UL (ref 0–0.2)
BASOPHILS NFR BLD: 0.9 %
BILIRUB SERPL-MCNC: 0.4 MG/DL (ref 0–1)
BILIRUB UR QL STRIP.AUTO: NEGATIVE
BUN SERPL-MCNC: 10 MG/DL (ref 7–20)
CALCIUM SERPL-MCNC: 9.8 MG/DL (ref 8.3–10.6)
CHLORIDE SERPL-SCNC: 104 MMOL/L (ref 99–110)
CLARITY UR: CLEAR
CO2 SERPL-SCNC: 30 MMOL/L (ref 21–32)
COLOR UR: YELLOW
CREAT SERPL-MCNC: 0.7 MG/DL (ref 0.6–1.1)
DEPRECATED RDW RBC AUTO: 14.3 % (ref 12.4–15.4)
EOSINOPHIL # BLD: 0.3 K/UL (ref 0–0.6)
EOSINOPHIL NFR BLD: 4 %
GFR SERPLBLD CREATININE-BSD FMLA CKD-EPI: >90 ML/MIN/{1.73_M2}
GLUCOSE SERPL-MCNC: 99 MG/DL (ref 70–99)
GLUCOSE UR STRIP.AUTO-MCNC: NEGATIVE MG/DL
HCT VFR BLD AUTO: 43.3 % (ref 36–48)
HGB BLD-MCNC: 14.4 G/DL (ref 12–16)
HGB UR QL STRIP.AUTO: NEGATIVE
KETONES UR STRIP.AUTO-MCNC: NEGATIVE MG/DL
LEUKOCYTE ESTERASE UR QL STRIP.AUTO: NEGATIVE
LYMPHOCYTES # BLD: 2.4 K/UL (ref 1–5.1)
LYMPHOCYTES NFR BLD: 30.3 %
MCH RBC QN AUTO: 31.1 PG (ref 26–34)
MCHC RBC AUTO-ENTMCNC: 33.2 G/DL (ref 31–36)
MCV RBC AUTO: 93.7 FL (ref 80–100)
MONOCYTES # BLD: 0.7 K/UL (ref 0–1.3)
MONOCYTES NFR BLD: 8.5 %
NEUTROPHILS # BLD: 4.4 K/UL (ref 1.7–7.7)
NEUTROPHILS NFR BLD: 56.3 %
NITRITE UR QL STRIP.AUTO: NEGATIVE
PH UR STRIP.AUTO: 7 [PH] (ref 5–8)
PLATELET # BLD AUTO: 287 K/UL (ref 135–450)
PMV BLD AUTO: 11.2 FL (ref 5–10.5)
POTASSIUM SERPL-SCNC: 4.3 MMOL/L (ref 3.5–5.1)
PROT SERPL-MCNC: 7.3 G/DL (ref 6.4–8.2)
PROT UR STRIP.AUTO-MCNC: NEGATIVE MG/DL
RBC # BLD AUTO: 4.62 M/UL (ref 4–5.2)
SODIUM SERPL-SCNC: 143 MMOL/L (ref 136–145)
SP GR UR STRIP.AUTO: 1.02 (ref 1–1.03)
UA COMPLETE W REFLEX CULTURE PNL UR: NORMAL
UA DIPSTICK W REFLEX MICRO PNL UR: NORMAL
URN SPEC COLLECT METH UR: NORMAL
UROBILINOGEN UR STRIP-ACNC: 0.2 E.U./DL
WBC # BLD AUTO: 7.9 K/UL (ref 4–11)

## 2024-05-09 PROCEDURE — 99213 OFFICE O/P EST LOW 20 MIN: CPT | Performed by: FAMILY MEDICINE

## 2024-05-09 NOTE — PATIENT INSTRUCTIONS
Body for Live by Rob Lux is an old book but still very effective program to build muscle and improve metabolism and drop body fat.,  (Caution: I don't recommend doing all the sets of weights, even with light dumbbell weights right away.  Gradually increase the number of repetitions for each muscle group..   Your muscles will be sore when you first start.  You do not need to take extra protein supplements unless you are not eating well or you are lifting really heavy weights.  The cardio program is good too but just doing any cardio 3 days per week is great.  You do not need to eat 6 times per day unless you want to see rapid weight loss)    Body for Life by Rob Lux is an excellent reference book for exercise, especially the weight lifting/weight training portion, which can increase your metabolism and burn more calories.   Make sure not to lift weights with the same muscle group for at least 72 hours, so the muscles can repair and grow.  We need to grow our muscles to burn more calories.  Use the weight lifting grids and spacing.  Week 1:  Upper body, skip a day, lower body, skip a day, upper body.  Week 2:  Lower body, skip a day, upper body, skip a day, lower body    Example:  pick several exercises for the body area you are work on for the day.  Do 12 repetitions with the lightest weight, 10 repetitions with the second lightest weight, 8 repetitions with the next heavier weight and then 6 repetitions with the heaviest weight you can handle with good form.   Then repeat the exercise 12 reps with the 3rd heaviest weight (the same one you used for the 8 reps).

## 2024-05-09 NOTE — PROGRESS NOTES
2024    Yuly Barlow (:  1965) is a 58 y.o. female, here for evaluation of the following chief complaint(s):  3 Month Follow-Up        ASSESSMENT/PLAN:    1. Impaired glucose tolerance  Check labs due to the addition of metformin.  - Basic Metabolic Panel  - Hemoglobin A1C    2. Obesity, morbid (HCC)  Counseled on further diet improvements and weight loss.  Encouraged her to add some exercise.  Consider weight training since it will improve her metabolism, which she feels is very slow.        FOLLOW UP:  Return in about 4 months (around 2024) for 3-4 months for recheck on weight and medication..      HPI    Medication follow up.  Started metformin but had a lot of trouble getting used to it.  She is now tolerating it and taking it regularly.  She is feeling like it is helping.  Her lower legs no longer swell, and she thinks her pants are fitting a little looser in the waistline.    Typical diet is: morning 1/2 of English muffin and boiled egg.  Since adding the boiled egg, she is no longer hungry in the middle the morning.  Gets hungry for lunch.  Thinks portion sizes are reasonable.    She does have 2 lemon Oreo cookies after her lunch food.  In the afternoon eats 3 peppermint patties.    Exercise;  during the day walks steps but not official exercise.    Liquids:  chocolate milk alternating with cranberry juice once a day.  Mostly water.  On the weekend may drink 2 pops or unsweet tea.      Never did a sleep study due to cost.   She is using her Fitbit michel for sleep and oxygen sats.   Says she sometimes has some high oxygen sats fluctuations, but says that are not very frequent.    Just started on the Metformin for a bit less than 3 months now.  She had a rough start with this due to stomach upset so has only been on the Metformin at 2 per day now for about a month.      See assessment above for other issues addressed at this visit.    Outpatient Medications Marked as Taking for the

## 2024-06-11 DIAGNOSIS — E03.9 ACQUIRED HYPOTHYROIDISM: ICD-10-CM

## 2024-06-11 RX ORDER — LEVOTHYROXINE SODIUM 112 UG/1
112 TABLET ORAL DAILY
Qty: 90 TABLET | Refills: 2 | Status: SHIPPED | OUTPATIENT
Start: 2024-06-11

## 2024-07-16 DIAGNOSIS — N39.0 RECURRENT UTI: ICD-10-CM

## 2024-07-16 DIAGNOSIS — A49.9 ESBL (EXTENDED SPECTRUM BETA-LACTAMASE) PRODUCING BACTERIA INFECTION: ICD-10-CM

## 2024-07-16 DIAGNOSIS — Z16.12 ESBL (EXTENDED SPECTRUM BETA-LACTAMASE) PRODUCING BACTERIA INFECTION: ICD-10-CM

## 2024-07-16 LAB

## 2024-07-25 ENCOUNTER — OFFICE VISIT (OUTPATIENT)
Dept: INTERNAL MEDICINE CLINIC | Age: 59
End: 2024-07-25
Payer: COMMERCIAL

## 2024-07-25 VITALS
WEIGHT: 220 LBS | HEIGHT: 62 IN | HEART RATE: 72 BPM | SYSTOLIC BLOOD PRESSURE: 130 MMHG | BODY MASS INDEX: 40.48 KG/M2 | DIASTOLIC BLOOD PRESSURE: 80 MMHG | OXYGEN SATURATION: 97 %

## 2024-07-25 DIAGNOSIS — E66.01 OBESITY, MORBID (HCC): ICD-10-CM

## 2024-07-25 DIAGNOSIS — R73.02 IMPAIRED GLUCOSE TOLERANCE: ICD-10-CM

## 2024-07-25 DIAGNOSIS — Z01.818 PRE-OPERATIVE CLEARANCE: Primary | ICD-10-CM

## 2024-07-25 DIAGNOSIS — J45.20 MILD INTERMITTENT ASTHMA WITHOUT COMPLICATION: ICD-10-CM

## 2024-07-25 DIAGNOSIS — G56.01 CARPAL TUNNEL SYNDROME OF RIGHT WRIST: ICD-10-CM

## 2024-07-25 DIAGNOSIS — G56.21 CUBITAL TUNNEL SYNDROME ON RIGHT: ICD-10-CM

## 2024-07-25 PROBLEM — E87.6 HYPOKALEMIA: Status: RESOLVED | Noted: 2023-12-21 | Resolved: 2024-07-25

## 2024-07-25 PROBLEM — E83.42 HYPOMAGNESEMIA: Status: RESOLVED | Noted: 2023-12-21 | Resolved: 2024-07-25

## 2024-07-25 PROCEDURE — 99213 OFFICE O/P EST LOW 20 MIN: CPT | Performed by: FAMILY MEDICINE

## 2024-07-25 NOTE — PROGRESS NOTES
cognition.    EKG:  see tracing from Dec 2023.        ASSESSMENT and Plan:  Patient is in medically stable condition with chronic medical issues.  Low risk anesthesia for this procedure so do not anticipate significant anesthesia issues.      1. Pre-operative clearance    2. Carpal tunnel syndrome of right wrist  3. Cubital tunnel syndrome on right  Reason for surgery.      4. Obesity, morbid (HCC)  Primary medical risk factor.      5. Impaired glucose tolerance  Last A1C 5.9.  not a full blown diabetic     6. Mild intermittent asthma without complication  Stable.  Under care of an allergist.          1. Preoperative workup as follows: none     2. Change in medication regimen before surgery: none    3. Prophylaxis for cardiac events with perioperative beta-blockers: Not indicated     4. Deep vein thrombosis prophylaxis: regimen to be chosen by surgical team if appropriate for type of surgery.      No contraindications to planned surgery.       Signature:       Electronically signed by Nkechi Bergman MD on 7/25/24 at 4:38 PM EDT    Nkechi Bergman MD  7/25/2024

## 2024-08-01 ENCOUNTER — PREP FOR PROCEDURE (OUTPATIENT)
Dept: ORTHOPEDIC SURGERY | Age: 59
End: 2024-08-01

## 2024-08-02 DIAGNOSIS — E66.01 OBESITY, CLASS III, BMI 40-49.9 (MORBID OBESITY) (HCC): ICD-10-CM

## 2024-08-02 DIAGNOSIS — K21.9 GASTROESOPHAGEAL REFLUX DISEASE, UNSPECIFIED WHETHER ESOPHAGITIS PRESENT: ICD-10-CM

## 2024-08-02 DIAGNOSIS — R73.02 IMPAIRED GLUCOSE TOLERANCE: ICD-10-CM

## 2024-08-02 RX ORDER — METFORMIN HYDROCHLORIDE 500 MG/1
1000 TABLET, EXTENDED RELEASE ORAL
Qty: 180 TABLET | Refills: 1 | Status: SHIPPED | OUTPATIENT
Start: 2024-08-02

## 2024-08-02 RX ORDER — ESOMEPRAZOLE MAGNESIUM 40 MG/1
CAPSULE, DELAYED RELEASE ORAL
Qty: 30 CAPSULE | Refills: 5 | Status: SHIPPED | OUTPATIENT
Start: 2024-08-02

## 2024-08-06 NOTE — PROGRESS NOTES
Name_______________________________________Printed:____________________  Date and time of surgery__8/8/24 0927______________________Arrival Time:_0800  [facility]____/per office____   1. The instructions given regarding when and if a patient needs to stop oral intake prior to surgery varies.Follow the specific instructions you were given                  _x__Nothing to eat or to drink after Midnight the night before.                   ____Carbo loading or instructions will be given to select patients-if you have been given those instructions -please do the following                           The evening before your surgery after dinner before midnight drink 40 ounces of gatorade.If you are diabetic use sugar free.  The morning of surgery drink 40 ounces of water.This needs to be finished 3 hours prior to your surgery start time.    2. Take the following pills with a small sip of water on the morning of surgery__levothyroxine and esomeprazole_________________________________________________                  Do not take blood pressure medications ending in pril or sartan the oscar prior to surgery or the morning of surgery. Dr Aguilar's patient are not to take any medications the AM of surgery.         3. Aspirin, Ibuprofen, Advil, Naproxen, Vitamin E and other Anti-inflammatory products and supplements should be stopped for 5 -7days before surgery or as directed by your physician.   4. Check with your Doctor regarding stopping Plavix, Coumadin,Eliquis, Lovenox,Effient,Pradaxa,Xarelto, Fragmin or other blood thinners and follow their instructions.   5. Do not smoke, and do not drink any alcoholic beverages 24 hours prior to surgery.  This includes NA Beer.Refrain from the usage of any recreational drugs.   6. You may brush your teeth and gargle the morning of surgery.  DO NOT SWALLOW WATER   7. You MUST make arrangements for a responsible adult to stay on site while you are here and take you home after your surgery. You will

## 2024-08-08 ENCOUNTER — ANESTHESIA (OUTPATIENT)
Dept: OPERATING ROOM | Age: 59
End: 2024-08-08
Payer: COMMERCIAL

## 2024-08-08 ENCOUNTER — HOSPITAL ENCOUNTER (OUTPATIENT)
Age: 59
Setting detail: OUTPATIENT SURGERY
Discharge: HOME | End: 2024-08-08
Attending: ORTHOPAEDIC SURGERY | Admitting: ORTHOPAEDIC SURGERY
Payer: COMMERCIAL

## 2024-08-08 ENCOUNTER — ANESTHESIA EVENT (OUTPATIENT)
Dept: OPERATING ROOM | Age: 59
End: 2024-08-08
Payer: COMMERCIAL

## 2024-08-08 VITALS
OXYGEN SATURATION: 97 % | DIASTOLIC BLOOD PRESSURE: 70 MMHG | WEIGHT: 220 LBS | HEIGHT: 62 IN | RESPIRATION RATE: 16 BRPM | TEMPERATURE: 97.5 F | BODY MASS INDEX: 40.48 KG/M2 | HEART RATE: 86 BPM | SYSTOLIC BLOOD PRESSURE: 132 MMHG

## 2024-08-08 LAB
GLUCOSE BLD-MCNC: 124 MG/DL (ref 70–99)
PERFORMED ON: ABNORMAL

## 2024-08-08 PROCEDURE — 3600000003 HC SURGERY LEVEL 3 BASE: Performed by: ORTHOPAEDIC SURGERY

## 2024-08-08 PROCEDURE — 7100000000 HC PACU RECOVERY - FIRST 15 MIN: Performed by: ORTHOPAEDIC SURGERY

## 2024-08-08 PROCEDURE — 3600000013 HC SURGERY LEVEL 3 ADDTL 15MIN: Performed by: ORTHOPAEDIC SURGERY

## 2024-08-08 PROCEDURE — A4217 STERILE WATER/SALINE, 500 ML: HCPCS | Performed by: ORTHOPAEDIC SURGERY

## 2024-08-08 PROCEDURE — 6360000002 HC RX W HCPCS: Performed by: NURSE ANESTHETIST, CERTIFIED REGISTERED

## 2024-08-08 PROCEDURE — 3700000001 HC ADD 15 MINUTES (ANESTHESIA): Performed by: ORTHOPAEDIC SURGERY

## 2024-08-08 PROCEDURE — 2500000003 HC RX 250 WO HCPCS: Performed by: NURSE ANESTHETIST, CERTIFIED REGISTERED

## 2024-08-08 PROCEDURE — 6360000002 HC RX W HCPCS: Performed by: ORTHOPAEDIC SURGERY

## 2024-08-08 PROCEDURE — 7100000001 HC PACU RECOVERY - ADDTL 15 MIN: Performed by: ORTHOPAEDIC SURGERY

## 2024-08-08 PROCEDURE — 2580000003 HC RX 258: Performed by: ORTHOPAEDIC SURGERY

## 2024-08-08 PROCEDURE — 2709999900 HC NON-CHARGEABLE SUPPLY: Performed by: ORTHOPAEDIC SURGERY

## 2024-08-08 PROCEDURE — 3700000000 HC ANESTHESIA ATTENDED CARE: Performed by: ORTHOPAEDIC SURGERY

## 2024-08-08 PROCEDURE — 7100000011 HC PHASE II RECOVERY - ADDTL 15 MIN: Performed by: ORTHOPAEDIC SURGERY

## 2024-08-08 PROCEDURE — 7100000010 HC PHASE II RECOVERY - FIRST 15 MIN: Performed by: ORTHOPAEDIC SURGERY

## 2024-08-08 RX ORDER — PROCHLORPERAZINE EDISYLATE 5 MG/ML
5 INJECTION INTRAMUSCULAR; INTRAVENOUS
Status: DISCONTINUED | OUTPATIENT
Start: 2024-08-08 | End: 2024-08-08 | Stop reason: HOSPADM

## 2024-08-08 RX ORDER — LIDOCAINE HYDROCHLORIDE 10 MG/ML
1 INJECTION, SOLUTION EPIDURAL; INFILTRATION; INTRACAUDAL; PERINEURAL
Status: DISCONTINUED | OUTPATIENT
Start: 2024-08-08 | End: 2024-08-08 | Stop reason: HOSPADM

## 2024-08-08 RX ORDER — MIDAZOLAM HYDROCHLORIDE 1 MG/ML
INJECTION INTRAMUSCULAR; INTRAVENOUS PRN
Status: DISCONTINUED | OUTPATIENT
Start: 2024-08-08 | End: 2024-08-08 | Stop reason: SDUPTHER

## 2024-08-08 RX ORDER — LIDOCAINE HYDROCHLORIDE 20 MG/ML
INJECTION, SOLUTION EPIDURAL; INFILTRATION; INTRACAUDAL; PERINEURAL PRN
Status: DISCONTINUED | OUTPATIENT
Start: 2024-08-08 | End: 2024-08-08 | Stop reason: SDUPTHER

## 2024-08-08 RX ORDER — SODIUM CHLORIDE, SODIUM LACTATE, POTASSIUM CHLORIDE, CALCIUM CHLORIDE 600; 310; 30; 20 MG/100ML; MG/100ML; MG/100ML; MG/100ML
INJECTION, SOLUTION INTRAVENOUS CONTINUOUS
Status: DISCONTINUED | OUTPATIENT
Start: 2024-08-08 | End: 2024-08-08 | Stop reason: HOSPADM

## 2024-08-08 RX ORDER — SODIUM CHLORIDE 0.9 % (FLUSH) 0.9 %
5-40 SYRINGE (ML) INJECTION EVERY 12 HOURS SCHEDULED
Status: DISCONTINUED | OUTPATIENT
Start: 2024-08-08 | End: 2024-08-08 | Stop reason: HOSPADM

## 2024-08-08 RX ORDER — LABETALOL HYDROCHLORIDE 5 MG/ML
10 INJECTION, SOLUTION INTRAVENOUS
Status: DISCONTINUED | OUTPATIENT
Start: 2024-08-08 | End: 2024-08-08 | Stop reason: HOSPADM

## 2024-08-08 RX ORDER — SODIUM CHLORIDE 9 MG/ML
INJECTION, SOLUTION INTRAVENOUS PRN
Status: DISCONTINUED | OUTPATIENT
Start: 2024-08-08 | End: 2024-08-08 | Stop reason: HOSPADM

## 2024-08-08 RX ORDER — BUPIVACAINE HYDROCHLORIDE 5 MG/ML
INJECTION, SOLUTION EPIDURAL; INTRACAUDAL
Status: COMPLETED | OUTPATIENT
Start: 2024-08-08 | End: 2024-08-08

## 2024-08-08 RX ORDER — SODIUM CHLORIDE 0.9 % (FLUSH) 0.9 %
5-40 SYRINGE (ML) INJECTION PRN
Status: DISCONTINUED | OUTPATIENT
Start: 2024-08-08 | End: 2024-08-08 | Stop reason: HOSPADM

## 2024-08-08 RX ORDER — FENTANYL CITRATE 50 UG/ML
25 INJECTION, SOLUTION INTRAMUSCULAR; INTRAVENOUS EVERY 5 MIN PRN
Status: DISCONTINUED | OUTPATIENT
Start: 2024-08-08 | End: 2024-08-08 | Stop reason: HOSPADM

## 2024-08-08 RX ORDER — PROPOFOL 10 MG/ML
INJECTION, EMULSION INTRAVENOUS PRN
Status: DISCONTINUED | OUTPATIENT
Start: 2024-08-08 | End: 2024-08-08 | Stop reason: SDUPTHER

## 2024-08-08 RX ORDER — SODIUM CHLORIDE 9 MG/ML
INJECTION, SOLUTION INTRAVENOUS CONTINUOUS
Status: DISCONTINUED | OUTPATIENT
Start: 2024-08-08 | End: 2024-08-08 | Stop reason: HOSPADM

## 2024-08-08 RX ORDER — ONDANSETRON 2 MG/ML
INJECTION INTRAMUSCULAR; INTRAVENOUS PRN
Status: DISCONTINUED | OUTPATIENT
Start: 2024-08-08 | End: 2024-08-08 | Stop reason: SDUPTHER

## 2024-08-08 RX ORDER — OXYCODONE HYDROCHLORIDE 5 MG/1
5 TABLET ORAL
Status: DISCONTINUED | OUTPATIENT
Start: 2024-08-08 | End: 2024-08-08 | Stop reason: HOSPADM

## 2024-08-08 RX ORDER — DEXAMETHASONE SODIUM PHOSPHATE 4 MG/ML
INJECTION, SOLUTION INTRA-ARTICULAR; INTRALESIONAL; INTRAMUSCULAR; INTRAVENOUS; SOFT TISSUE PRN
Status: DISCONTINUED | OUTPATIENT
Start: 2024-08-08 | End: 2024-08-08 | Stop reason: SDUPTHER

## 2024-08-08 RX ORDER — HYDROMORPHONE HYDROCHLORIDE 2 MG/ML
0.5 INJECTION, SOLUTION INTRAMUSCULAR; INTRAVENOUS; SUBCUTANEOUS EVERY 5 MIN PRN
Status: DISCONTINUED | OUTPATIENT
Start: 2024-08-08 | End: 2024-08-08 | Stop reason: HOSPADM

## 2024-08-08 RX ORDER — MAGNESIUM HYDROXIDE 1200 MG/15ML
LIQUID ORAL CONTINUOUS PRN
Status: COMPLETED | OUTPATIENT
Start: 2024-08-08 | End: 2024-08-08

## 2024-08-08 RX ORDER — ONDANSETRON 2 MG/ML
4 INJECTION INTRAMUSCULAR; INTRAVENOUS
Status: DISCONTINUED | OUTPATIENT
Start: 2024-08-08 | End: 2024-08-08 | Stop reason: HOSPADM

## 2024-08-08 RX ORDER — FENTANYL CITRATE 50 UG/ML
INJECTION, SOLUTION INTRAMUSCULAR; INTRAVENOUS PRN
Status: DISCONTINUED | OUTPATIENT
Start: 2024-08-08 | End: 2024-08-08 | Stop reason: SDUPTHER

## 2024-08-08 RX ORDER — HYDRALAZINE HYDROCHLORIDE 20 MG/ML
10 INJECTION INTRAMUSCULAR; INTRAVENOUS
Status: DISCONTINUED | OUTPATIENT
Start: 2024-08-08 | End: 2024-08-08 | Stop reason: HOSPADM

## 2024-08-08 RX ADMIN — ONDANSETRON 4 MG: 2 INJECTION INTRAMUSCULAR; INTRAVENOUS at 09:00

## 2024-08-08 RX ADMIN — FENTANYL CITRATE 50 MCG: 50 INJECTION, SOLUTION INTRAMUSCULAR; INTRAVENOUS at 08:59

## 2024-08-08 RX ADMIN — SODIUM CHLORIDE: 9 INJECTION, SOLUTION INTRAVENOUS at 08:05

## 2024-08-08 RX ADMIN — DEXAMETHASONE SODIUM PHOSPHATE 8 MG: 4 INJECTION, SOLUTION INTRAMUSCULAR; INTRAVENOUS at 09:00

## 2024-08-08 RX ADMIN — FENTANYL CITRATE 50 MCG: 50 INJECTION, SOLUTION INTRAMUSCULAR; INTRAVENOUS at 08:55

## 2024-08-08 RX ADMIN — LIDOCAINE HYDROCHLORIDE 50 MG: 20 INJECTION, SOLUTION EPIDURAL; INFILTRATION; INTRACAUDAL; PERINEURAL at 08:50

## 2024-08-08 RX ADMIN — PROPOFOL 150 MG: 10 INJECTION, EMULSION INTRAVENOUS at 08:50

## 2024-08-08 RX ADMIN — PROPOFOL 50 MG: 10 INJECTION, EMULSION INTRAVENOUS at 08:52

## 2024-08-08 RX ADMIN — MIDAZOLAM 2 MG: 1 INJECTION INTRAMUSCULAR; INTRAVENOUS at 08:48

## 2024-08-08 ASSESSMENT — ENCOUNTER SYMPTOMS: SHORTNESS OF BREATH: 0

## 2024-08-08 ASSESSMENT — PAIN - FUNCTIONAL ASSESSMENT
PAIN_FUNCTIONAL_ASSESSMENT: NONE - DENIES PAIN
PAIN_FUNCTIONAL_ASSESSMENT: 0-10
PAIN_FUNCTIONAL_ASSESSMENT: NONE - DENIES PAIN

## 2024-08-08 NOTE — OP NOTE
OPERATIVE REPORT          Patient:  Yuly Barlow    YOB: 1965  Date of Service:  8/8/2024   Location:  [facility]      Preoperative Diagnoses:  Right  carpal tunnel syndrome & Right cubital tunnel syndrome     Postoperative Diagnoses:  Same    Procedures:   Right  Carpal Tunnel Release & Right Ulnar Nerve decompression at the Cubital Tunnel     Surgeon:    Selvin Cortes MD    Surgical Assistant:    Rosalinda Salas PA-C    Anesthesia:   General                                   Blood Loss:    Minimal         Complications:    None                          Tourniquet Time:   4 minutes      Indications: Ms. Yuly Barlow is a 58 y.o.  year old female with Right  carpal tunnel syndrome & Right cubital tunnel syndrome .   I have discussed preoperatively with her  the complications, limitations, expectations, alternatives and risk of the planned surgical care which she understood & all of her  questions were answered.  Ms. Yuly Barlow has provided written informed consent to proceed.    After written consent was obtained and the proper operative sites were identified and marked, Ms. Yuly Barlow was brought to the operating room and placed in the supine position on the operating room table with the Right arm extended upon a hand table. General anesthesia was induced & the Right upper extremity was prepped and draped in the usual sterile fashion.    Procedure:   After Esmarch exsanguination, the pneuomo-tourniquet was inflated to 250 millimeters of Mercury about the arm.       Attention was turned to the medial elbow.  A curvilinear incision was fashioned over the posterior medial aspect of the elbow centered between the medial epicondyle and the tip of the olecranon. Dissection was carried carefully through the subcutaneous tissue identifying and protecting the superficial neurovascular structures.  The cubital tunnel was identified and cleared of overlying soft tissue.

## 2024-08-08 NOTE — DISCHARGE INSTRUCTIONS
Post-Operative Instructions    Ulnar Nerve Release:    Keep hand elevated with fingers above eye-level to control swelling.  Keep hand and bandage clean and dry.  Do not change or unwrap bandage.  Please leave bandage in place until your follow-up appointment.  Maintain finger motion by fully straightening and fully bending fingers and thumb at least once an hour (while awake).  This may cause some discomfort, but will not damage surgery.  You may use your operated hand for lightweight tasks (e.g. writing, eating, dressing, etc.).  NO LIFTING, CARRYING OR HEAVY USE.    Most Patients do not have \"Serious Pain\" after this procedure and thus most patients do not require prescription pain medication.  You may take over the counter medication (Tylenol, Advil, Aleve, etc.) as needed.  If you are unable to tolerate the discomfort after your surgery and the OTC medications do not provide some relief, you may contact our office to discuss other options..    Please call the office at [phone]  in 24 - 48 hours to schedule a follow up appointment for approximately 7 - 10 days after surgery.  Please call the office at [phone]  if you have any questions or problems.           Selvin Cortes MD      ANESTHESIA DISCHARGE INSTRUCTIONS    Wear your seatbelt home.  You are under the influence of drugs-do not drink alcohol, drive, operate machinery, make any important decisions or sign any legal documents for 24 hours.  Children should not ride bikes, skate boards or play on gym sets for 24 hours after surgery.  A responsible adult needs to be with you for 24 hours.  You may experience lightheadedness, dizziness, or sleepiness following surgery.  Rest at home today- increase activity as tolerated.  Progress slowly to a regular diet unless your physician has instructed you otherwise. Drink plenty of water.  If persistent nausea and vomiting becomes a problem, call your physician.  Coughing, sore throat and muscle aches

## 2024-08-08 NOTE — H&P
Pre-operative Update of H&P:    I  have seen & examined Ms. Yuly GUTIÉRREZ Yen related solely to her hand and upper extremity conditions, prior to the scheduled procedure on the date of her surgery.  The indications for the planned surgical procedure & and her upper-extremity condition are unchanged.

## 2024-08-08 NOTE — ANESTHESIA PRE PROCEDURE
Department of Anesthesiology  Preprocedure Note       Name:  Yuly Barlow   Age:  58 y.o.  :  1965                                          MRN:  [id number]         Date:  2024      Surgeon: Surgeon(s):  Selvin Cortes MD    Procedure: Procedure(s):  RIGHT ULNAR NERVE DECOMPRESSION AT ELBOW  RIGHT CARPAL TUNNEL RELEASE    Medications prior to admission:   Prior to Admission medications    Medication Sig Start Date End Date Taking? Authorizing Provider   esomeprazole (NEXIUM) 40 MG delayed release capsule TAKE 1 CAPSULE BY MOUTH EVERY DAY BEFORE BREAKFAST 24   Nkechi Bergman MD   metFORMIN (GLUCOPHAGE-XR) 500 MG extended release tablet TAKE 2 TABLETS BY MOUTH DAILY WITH BREAKFAST 24   Nkechi Bergman MD   levothyroxine (SYNTHROID) 112 MCG tablet TAKE 1 TABLET BY MOUTH DAILY 24   Nkechi Bergman MD   estradiol (ESTRACE) 0.5 MG tablet Take 1 tablet by mouth daily  Patient taking differently: Take 1 tablet by mouth three times a week -F 3/20/24   Nkechi Bergman MD   fluticasone (FLONASE) 50 MCG/ACT nasal spray 1-2 sprays each nostril daily.  Patient taking differently: 1 spray daily 1-2 sprays each nostril daily. 24   Nkechi Bergman MD   cetirizine (ZYRTEC) 10 MG tablet Take 1 tablet by mouth at bedtime    Ike Rangel MD   Multiple Vitamin (MULTIVITAMIN) TABS tablet Take 1 tablet by mouth daily    Ike Rangel MD   Probiotic Product (FORTIFY PROBIOTIC WOMENS PO) Take 1 tablet by mouth daily    Ike Rangel MD   Calcium Carbonate-Vitamin D (CALTRATE 600+D PO) Take 1 tablet by mouth daily But sometimes takes Citracal generic, depending on what she can find    Ike Rangel MD   Cranberry 200 MG CAPS Take 1 capsule by mouth nightly    Ike Rangel MD   magnesium (MAGNESIUM-OXIDE) 250 MG TABS tablet Take 1 tablet by mouth daily Sun - Thurs    Ike Rangel MD   famotidine (PEPCID) 20 MG tablet Take 1 tablet

## 2024-08-08 NOTE — ANESTHESIA POSTPROCEDURE EVALUATION
Department of Anesthesiology  Postprocedure Note    Patient: Yuly Barlow  MRN: [id number]  YOB: 1965  Date of evaluation: 8/8/2024    Procedure Summary       Date: 08/08/24 Room / Location: [address]    Anesthesia Start: 0848 Anesthesia Stop: 0920    Procedures:       RIGHT ULNAR NERVE DECOMPRESSION AT ELBOW (Right: Elbow)      RIGHT CARPAL TUNNEL RELEASE (Right: Wrist) Diagnosis:       Cubital tunnel syndrome on right      Right carpal tunnel syndrome      (Cubital tunnel syndrome on right [G56.21])      (Right carpal tunnel syndrome [G56.01])    Surgeons: Selvin Cortes MD Responsible Provider: Anna Angulo MD    Anesthesia Type: general ASA Status: 3            Anesthesia Type: No value filed.    Jigar Phase I: Jigar Score: 10    Jigar Phase II: Jigar Score: 10    Anesthesia Post Evaluation    Patient location during evaluation: PACU  Patient participation: complete - patient participated  Level of consciousness: awake and alert  Airway patency: patent  Nausea & Vomiting: no nausea and no vomiting  Cardiovascular status: hemodynamically stable  Respiratory status: acceptable  Hydration status: stable  Multimodal analgesia pain management approach  Pain management: adequate    No notable events documented.

## 2024-08-14 ENCOUNTER — OFFICE VISIT (OUTPATIENT)
Dept: ORTHOPEDIC SURGERY | Age: 59
End: 2024-08-14

## 2024-08-14 VITALS — HEIGHT: 62 IN | WEIGHT: 220 LBS | RESPIRATION RATE: 16 BRPM | BODY MASS INDEX: 40.48 KG/M2

## 2024-08-14 DIAGNOSIS — Z98.890 STATUS POST CARPAL TUNNEL RELEASE: Primary | ICD-10-CM

## 2024-08-14 PROCEDURE — 99024 POSTOP FOLLOW-UP VISIT: CPT | Performed by: ORTHOPAEDIC SURGERY

## 2024-08-14 NOTE — PROGRESS NOTES
Ms. Yuly Barlow returns today in follow-up of her recent right Ulnar Nerve Decompression (Cubital Tunnel Release) & Carpal Tunnel Release done approximately 8 days ago.  She has done well noting mild discomfort and no other reported complications.    She notes pre-operative symptoms to be significantly improved at this time.    Physical Exam:  Bandage intact and well cared for   Skin incision is healing well, without erythema, drainage or sign of infection.  Digital range of motion is without significant limitation.  Wrist range of motion is without significant limitation.  Elbow range of motion is without significant limitation.  Sensation is significantly improved in the Median Innervated Digits and Ulnar Innervated Digits.  Vascular examination reveals normal, good capillary refill, and good color.  Swelling is mild.  Sensory and Motor Median & Ulnar Nerve function is intact.    Impression:  Ms. Yuly Barlow is doing well after recent right Ulnar Nerve Decompression (Cubital Tunnel Release) &  Carpal Tunnel Release.    Plan:  Ms. Yuly Barlow is instructed in work on Active & Passive range of motion of the digits, wrist, & elbow.  These modalities were specifically demonstrated to her today.  We discussed the appropriateness of gradual resumption of use of the operated hand and the return to normal use as comfort allows.  She is given instructions regarding management of the fresh surgical incision and progressive use of desensitization and tissue massage techniques.  We discussed the appropriate expectations and timeline for symptom improvement.    She is provided a written patient instruction sheet titled: Postoperative Instructions After Ulnar Nerve Decompression.    I have asked Ms. Yuly Barlow to follow-up with me or contact me by telephone over the next 2-4 weeks if her symptoms have not fully resolved or if she has not regained full & painless return of function.      She is also

## 2024-08-14 NOTE — PATIENT INSTRUCTIONS
Postoperative Instructions After Carpal Tunnel Release    Dr. Selvin Cortes        After bandages are removed one week from surgery, you may chose to wear a small bandage over the incision if you wish, though you do not need to.  Keep incision dry until sutures have fully dissolved  or it has been 12 - 14 days since your surgery. Thereafter, you may wash with mild soap and water and shower normally.    Work hard on motion of the fingers and wrist, straightening each finger fully and bending each finger fully, bending wrist forward and bending wrist backwards. Do not be concerned if you experience discomfort.  This will not damage the surgery.  You may begin using the hand as it feels comfortable beginning 12 - 14 days from the day of surgery. You may not feel entirely comfortable gripping or lifting heavy objects for several weeks.  You may expect to see some skin “peel” off around the incision.  You may be left with a small area of “pink baby skin”. This is quite normal.  6. Once your stiches have fully disappeared & skin appears normal, you should begin gently massaging the incision with Vitamin E (may use Vitamin E lotion or contents of Vitamin E capsule).      Thank you for choosing [facility] for your Hand and Upper Extremity needs.  If we can be of any further assistance to you, please do not hesitate to contact us.    Office Phone Number:  [phone]  or  [phone]

## 2024-08-29 ENCOUNTER — OFFICE VISIT (OUTPATIENT)
Dept: INTERNAL MEDICINE CLINIC | Age: 59
End: 2024-08-29
Payer: COMMERCIAL

## 2024-08-29 VITALS
WEIGHT: 219.4 LBS | HEIGHT: 62 IN | OXYGEN SATURATION: 98 % | BODY MASS INDEX: 40.37 KG/M2 | HEART RATE: 67 BPM | DIASTOLIC BLOOD PRESSURE: 82 MMHG | SYSTOLIC BLOOD PRESSURE: 130 MMHG

## 2024-08-29 DIAGNOSIS — K21.9 GASTROESOPHAGEAL REFLUX DISEASE WITHOUT ESOPHAGITIS: ICD-10-CM

## 2024-08-29 DIAGNOSIS — E66.01 OBESITY, MORBID (HCC): Primary | ICD-10-CM

## 2024-08-29 DIAGNOSIS — R73.02 IMPAIRED GLUCOSE TOLERANCE: ICD-10-CM

## 2024-08-29 PROCEDURE — 99213 OFFICE O/P EST LOW 20 MIN: CPT | Performed by: FAMILY MEDICINE

## 2024-08-29 RX ORDER — TIRZEPATIDE 2.5 MG/.5ML
0.25 INJECTION, SOLUTION SUBCUTANEOUS WEEKLY
Qty: 2 ML | Refills: 0 | Status: SHIPPED | OUTPATIENT
Start: 2024-08-29

## 2024-08-29 NOTE — PATIENT INSTRUCTIONS
If the Zepbound is not covered, then we can consider increasing the dose of metformin instead.  If the Zepbound goes through, then stop the Metformin.

## 2024-08-29 NOTE — PROGRESS NOTES
2024    Yuly Barlow (:  1965) is a 58 y.o. female, here for evaluation of the following chief complaint(s):  3 Month Follow-Up (Med & weight check )    Wt Readings from Last 3 Encounters:   24 99.5 kg (219 lb 6.4 oz)   24 99.8 kg (220 lb)   24 99.8 kg (220 lb)         ASSESSMENT/PLAN:    1. Obesity, morbid (HCC)  Zepbound is listed as a potential cover drug but needs prior authorization.  She has struggled to lose weight most of her adult life.  She is at high risk of developing diabetes.  Her chronic acid reflux would benefit from weight loss also.  If the Zepbound is not covered or unaffordable, will need to consider other treatment.  - Tirzepatide-Weight Management (ZEPBOUND) 2.5 MG/0.5ML SOAJ; Inject 0.25 mg into the skin once a week  Dispense: 2 mL; Refill: 0    2. Impaired glucose tolerance  - Tirzepatide-Weight Management (ZEPBOUND) 2.5 MG/0.5ML SOAJ; Inject 0.25 mg into the skin once a week  Dispense: 2 mL; Refill: 0  - Olga Cifuentes RDN, Diabetes Education (Non Care Coord Patient), [facility]    3. Gastroesophageal reflux disease without esophagitis  - Tirzepatide-Weight Management (ZEPBOUND) 2.5 MG/0.5ML SOAJ; Inject 0.25 mg into the skin once a week  Dispense: 2 mL; Refill: 0        FOLLOW UP:  Will determine follow-up based on what medication is used for weight loss next.  Most likely will need to be seen within 3 months.    ADI Car is here for medication related to her obesity and impaired glucose tolerance.  She is tolerating 2 metformin per day and it seems to agree with her.  Unfortunately there has been 0 weight loss.  From our scale it looks like she lost half a pound since she started medication in May.  She does not think she is craving food or always hungry.  She feels she does not eat large portions.  She just does not understand why she is large and cannot lose weight.    See assessment above for other issues addressed at this

## 2024-09-11 ENCOUNTER — PATIENT MESSAGE (OUTPATIENT)
Dept: INTERNAL MEDICINE CLINIC | Age: 59
End: 2024-09-11

## 2024-10-17 DIAGNOSIS — Z16.12 ESBL (EXTENDED SPECTRUM BETA-LACTAMASE) PRODUCING BACTERIA INFECTION: ICD-10-CM

## 2024-10-17 DIAGNOSIS — N39.0 RECURRENT UTI: ICD-10-CM

## 2024-10-17 DIAGNOSIS — A49.9 ESBL (EXTENDED SPECTRUM BETA-LACTAMASE) PRODUCING BACTERIA INFECTION: ICD-10-CM

## 2024-10-17 LAB
BACTERIA URNS QL MICRO: NORMAL /HPF
BILIRUB UR QL STRIP.AUTO: NEGATIVE
CLARITY UR: CLEAR
COLOR UR: YELLOW
EPI CELLS #/AREA URNS AUTO: 4 /HPF (ref 0–5)
GLUCOSE UR STRIP.AUTO-MCNC: NEGATIVE MG/DL
HGB UR QL STRIP.AUTO: NEGATIVE
HYALINE CASTS #/AREA URNS AUTO: 0 /LPF (ref 0–8)
KETONES UR STRIP.AUTO-MCNC: NEGATIVE MG/DL
LEUKOCYTE ESTERASE UR QL STRIP.AUTO: ABNORMAL
NITRITE UR QL STRIP.AUTO: NEGATIVE
PH UR STRIP.AUTO: 7 [PH] (ref 5–8)
PROT UR STRIP.AUTO-MCNC: NEGATIVE MG/DL
RBC CLUMPS #/AREA URNS AUTO: 1 /HPF (ref 0–4)
SP GR UR STRIP.AUTO: 1.02 (ref 1–1.03)
UA COMPLETE W REFLEX CULTURE PNL UR: ABNORMAL
UA DIPSTICK W REFLEX MICRO PNL UR: YES
URN SPEC COLLECT METH UR: ABNORMAL
UROBILINOGEN UR STRIP-ACNC: 0.2 E.U./DL
WBC #/AREA URNS AUTO: 0 /HPF (ref 0–5)

## 2024-11-21 DIAGNOSIS — A49.9 ESBL (EXTENDED SPECTRUM BETA-LACTAMASE) PRODUCING BACTERIA INFECTION: ICD-10-CM

## 2024-11-21 DIAGNOSIS — Z16.12 ESBL (EXTENDED SPECTRUM BETA-LACTAMASE) PRODUCING BACTERIA INFECTION: ICD-10-CM

## 2024-11-21 DIAGNOSIS — N39.0 RECURRENT UTI: ICD-10-CM

## 2024-11-21 LAB
BACTERIA URNS QL MICRO: ABNORMAL /HPF
BILIRUB UR QL STRIP.AUTO: NEGATIVE
CLARITY UR: CLEAR
COLOR UR: YELLOW
EPI CELLS #/AREA URNS AUTO: 6 /HPF (ref 0–5)
GLUCOSE UR STRIP.AUTO-MCNC: NEGATIVE MG/DL
HGB UR QL STRIP.AUTO: NEGATIVE
HYALINE CASTS #/AREA URNS AUTO: 0 /LPF (ref 0–8)
KETONES UR STRIP.AUTO-MCNC: NEGATIVE MG/DL
LEUKOCYTE ESTERASE UR QL STRIP.AUTO: ABNORMAL
NITRITE UR QL STRIP.AUTO: NEGATIVE
PH UR STRIP.AUTO: 5.5 [PH] (ref 5–8)
PROT UR STRIP.AUTO-MCNC: NEGATIVE MG/DL
RBC CLUMPS #/AREA URNS AUTO: 1 /HPF (ref 0–4)
SP GR UR STRIP.AUTO: 1.01 (ref 1–1.03)
UA COMPLETE W REFLEX CULTURE PNL UR: ABNORMAL
UA DIPSTICK W REFLEX MICRO PNL UR: YES
URN SPEC COLLECT METH UR: ABNORMAL
UROBILINOGEN UR STRIP-ACNC: 0.2 E.U./DL
WBC #/AREA URNS AUTO: 5 /HPF (ref 0–5)

## 2024-11-22 ENCOUNTER — OFFICE VISIT (OUTPATIENT)
Dept: INTERNAL MEDICINE CLINIC | Age: 59
End: 2024-11-22

## 2024-11-22 VITALS
SYSTOLIC BLOOD PRESSURE: 133 MMHG | HEIGHT: 62 IN | OXYGEN SATURATION: 96 % | HEART RATE: 69 BPM | DIASTOLIC BLOOD PRESSURE: 73 MMHG | WEIGHT: 217 LBS | BODY MASS INDEX: 39.93 KG/M2

## 2024-11-22 DIAGNOSIS — R10.30 LOWER ABDOMINAL PAIN: ICD-10-CM

## 2024-11-22 DIAGNOSIS — N89.8 VAGINAL ITCHING: Primary | ICD-10-CM

## 2024-11-22 DIAGNOSIS — N39.0 RECURRENT UTI: ICD-10-CM

## 2024-11-22 NOTE — PROGRESS NOTES
2024    Yuly Barlow (:  1965) is a 59 y.o. female, here for evaluation of the following chief complaint(s):  Vaginal Pain (Burning in the area. )        ASSESSMENT/PLAN:    1. Vaginal itching  Rule out yeast or other vaginal pathogen.  She may use Monistat-Derm cream in the interim until we can get her test results and formal treatment.  - VAGINITIS PANEL PCR    2. Lower abdominal pain  May be due to vaginitis versus constipation.  No evidence of UTI today.  Suggested using senna or senna S to treat the constipation.    3. Recurrent UTI  She used up her last open order for urine with reflex.  She has a history of urosepsis.  I put new open orders and for as needed use.  - Urinalysis with Reflex to Culture; Standing        FOLLOW UP:  Call or return to clinic prn if these symptoms worsen or fail to improve as anticipated.      HPI    Lower abdominal pressure.  Now more of constant ache.  It feels like it burns in the perineum.  It waxes and wanes over this week.  Does not hurt to urinate.   No vaginal discharge or unusual odor.  Has urine urgency.      Started 2 weeks ago.  But got better with drinking more water.  When she wiped on Tues AM, there was a spot of blood.  Wed maybe a little bit of blood in the urine.    Tuesday was the worst day.      Bowels:  usually pretty consistent but Tuesday some constipation but this is getting back to normal.      See assessment above for other issues addressed at this visit.    Outpatient Medications Marked as Taking for the 24 encounter (Office Visit) with Nkechi Bergman MD   Medication Sig Dispense Refill   • esomeprazole (NEXIUM) 40 MG delayed release capsule TAKE 1 CAPSULE BY MOUTH EVERY DAY BEFORE BREAKFAST 30 capsule 5   • metFORMIN (GLUCOPHAGE-XR) 500 MG extended release tablet TAKE 2 TABLETS BY MOUTH DAILY WITH BREAKFAST 180 tablet 1   • levothyroxine (SYNTHROID) 112 MCG tablet TAKE 1 TABLET BY MOUTH DAILY 90 tablet 2   • estradiol

## 2024-11-22 NOTE — PATIENT INSTRUCTIONS
Start monistat over the counter if you feel you need to start something now.    Take Senna or Senokot or Sennosides S and try to get bowels better cleaned out.

## 2024-11-23 LAB
BV BACTERIA DNA VAG QL NAA+PROBE: NOT DETECTED
C GLABRATA DNA VAG QL NAA+PROBE: ABNORMAL
C GLABRATA DNA VAG QL NAA+PROBE: NOT DETECTED
C KRUSEI DNA VAG QL NAA+PROBE: NOT DETECTED
CANDIDA DNA VAG QL NAA+PROBE: DETECTED
T VAGINALIS DNA VAG QL NAA+PROBE: NOT DETECTED

## 2024-11-23 RX ORDER — FLUCONAZOLE 150 MG/1
150 TABLET ORAL ONCE
Qty: 1 TABLET | Refills: 0 | Status: SHIPPED | OUTPATIENT
Start: 2024-11-23 | End: 2024-11-23

## 2024-12-21 ENCOUNTER — OFFICE VISIT (OUTPATIENT)
Age: 59
End: 2024-12-21

## 2024-12-21 VITALS
HEIGHT: 62 IN | OXYGEN SATURATION: 98 % | DIASTOLIC BLOOD PRESSURE: 81 MMHG | BODY MASS INDEX: 39.95 KG/M2 | HEART RATE: 92 BPM | SYSTOLIC BLOOD PRESSURE: 134 MMHG | TEMPERATURE: 98 F | WEIGHT: 217.1 LBS

## 2024-12-21 DIAGNOSIS — R31.9 URINARY TRACT INFECTION WITH HEMATURIA, SITE UNSPECIFIED: Primary | ICD-10-CM

## 2024-12-21 DIAGNOSIS — R30.0 DYSURIA: ICD-10-CM

## 2024-12-21 DIAGNOSIS — N39.0 URINARY TRACT INFECTION WITH HEMATURIA, SITE UNSPECIFIED: Primary | ICD-10-CM

## 2024-12-21 LAB
BILIRUBIN, POC: NEGATIVE
BLOOD URINE, POC: ABNORMAL
CLARITY, POC: CLEAR
COLOR, POC: ABNORMAL
GLUCOSE URINE, POC: NEGATIVE MG/DL
KETONES, POC: NEGATIVE MG/DL
LEUKOCYTE EST, POC: ABNORMAL
NITRITE, POC: NEGATIVE
PH, POC: 7
PROTEIN, POC: ABNORMAL MG/DL
SPECIFIC GRAVITY, POC: 1.01
UROBILINOGEN, POC: 0.2 MG/DL

## 2024-12-21 RX ORDER — NITROFURANTOIN 25; 75 MG/1; MG/1
100 CAPSULE ORAL 2 TIMES DAILY
Qty: 14 CAPSULE | Refills: 0 | Status: SHIPPED | OUTPATIENT
Start: 2024-12-21 | End: 2024-12-28

## 2024-12-21 RX ORDER — FLUCONAZOLE 150 MG/1
150 TABLET ORAL ONCE
Qty: 1 TABLET | Refills: 0 | Status: SHIPPED | OUTPATIENT
Start: 2024-12-21 | End: 2024-12-21

## 2024-12-21 NOTE — PROGRESS NOTES
Yuly Barlow (:  1965) is a 59 y.o. female,New patient, here for evaluation of the following chief complaint(s):  Urinary Tract Infection (Burning sensation, bladder pressure, blood in urine and urgency to urinate starting last night. Patient states she took AZO for current symptoms.)      ASSESSMENT/PLAN:  1. Dysuria  - POCT Urinalysis no Micro  - fluconazole (DIFLUCAN) 150 MG tablet; Take 1 tablet by mouth once for 1 dose  Dispense: 1 tablet; Refill: 0    2. Urinary tract infection with hematuria, site unspecified  - Culture, Urine  - nitrofurantoin, macrocrystal-monohydrate, (MACROBID) 100 MG capsule; Take 1 capsule by mouth 2 times daily for 7 days  Dispense: 14 capsule; Refill: 0       Return if symptoms worsen or fail to improve.  0 Result Notes      Component  Ref Range & Units 24 1323   Color, UA light red   Clarity, UA clear   Glucose, UA POC  mg/dL negative   Bilirubin, UA negative   Ketones, UA  mg/dL negative   Spec Grav, UA 1.010   Blood, UA POC 3+   pH, UA 7.0   Protein, UA POC  mg/dL 1+   Urobilinogen, UA  mg/dL 0.2   Leukocytes, UA 1+   Nitrite, UA negative                SUBJECTIVE/OBJECTIVE:  PRESENT TO CLINIC WITH DYSURIA,URGENCY AND HEMATURIA FOR TWO DAYS. NO FEVER. NO NAUSEA O VOMITING.      History provided by:  Patient  Urinary Tract Infection  Associated symptoms include hematuria.       Vitals:    24 1313   BP: 134/81   Site: Left Upper Arm   Position: Sitting   Cuff Size: Large Adult   Pulse: 92   Temp: 98 °F (36.7 °C)   TempSrc: Oral   SpO2: 98%   Weight: 98.5 kg (217 lb 1.6 oz)   Height: 1.575 m (5' 2\")       Review of Systems   Genitourinary:  Positive for dysuria, hematuria and urgency.       Physical Exam  Constitutional:       Appearance: Normal appearance.   HENT:      Head: Normocephalic and atraumatic.      Nose: Nose normal.      Mouth/Throat:      Mouth: Mucous membranes are moist.   Eyes:      Pupils: Pupils are equal, round, and reactive to light.

## 2024-12-23 LAB — BACTERIA UR CULT: NORMAL

## 2024-12-26 ENCOUNTER — OFFICE VISIT (OUTPATIENT)
Dept: INTERNAL MEDICINE CLINIC | Age: 59
End: 2024-12-26

## 2024-12-26 VITALS
TEMPERATURE: 98 F | BODY MASS INDEX: 39.82 KG/M2 | HEART RATE: 82 BPM | DIASTOLIC BLOOD PRESSURE: 74 MMHG | SYSTOLIC BLOOD PRESSURE: 118 MMHG | HEIGHT: 62 IN | OXYGEN SATURATION: 97 % | WEIGHT: 216.4 LBS

## 2024-12-26 DIAGNOSIS — N39.0 RECURRENT UTI: Primary | ICD-10-CM

## 2024-12-26 DIAGNOSIS — R10.30 LOWER ABDOMINAL PAIN: ICD-10-CM

## 2024-12-26 DIAGNOSIS — N39.0 RECURRENT UTI: ICD-10-CM

## 2024-12-26 LAB
BILIRUBIN, POC: NEGATIVE
BLOOD URINE, POC: NEGATIVE
CLARITY, POC: CLEAR
COLOR, POC: YELLOW
GLUCOSE URINE, POC: NEGATIVE MG/DL
KETONES, POC: NEGATIVE MG/DL
LEUKOCYTE EST, POC: NEGATIVE
NITRITE, POC: NEGATIVE
PH, POC: 6
PROTEIN, POC: NEGATIVE MG/DL
SPECIFIC GRAVITY, POC: 1.02
UROBILINOGEN, POC: 0.2 MG/DL

## 2024-12-26 ASSESSMENT — ENCOUNTER SYMPTOMS
ABDOMINAL PAIN: 0
DIARRHEA: 0
CONSTIPATION: 0
VOMITING: 0
NAUSEA: 0

## 2024-12-26 NOTE — PROGRESS NOTES
tunnel syndrome    Ulnar neuropathy of right upper extremity    Cubital tunnel syndrome on right    Right carpal tunnel syndrome    Recurrent UTI    Lower abdominal pain       No data recorded    Objective/Physical Exam:  Vitals:    12/26/24 1250   BP: 118/74   Site: Left Upper Arm   Position: Sitting   Cuff Size: Medium Adult   Pulse: 82   Temp: 98 °F (36.7 °C)   SpO2: 97%   Weight: 98.2 kg (216 lb 6.4 oz)   Height: 1.575 m (5' 2\")      Body mass index is 39.58 kg/m².    Physical Exam  Vitals reviewed.   Constitutional:       Appearance: Normal appearance. She is not ill-appearing.   HENT:      Head: Normocephalic and atraumatic.   Cardiovascular:      Rate and Rhythm: Normal rate and regular rhythm.      Heart sounds: Normal heart sounds. No murmur heard.  Pulmonary:      Effort: Pulmonary effort is normal. No respiratory distress.      Breath sounds: Normal breath sounds.   Abdominal:      General: Bowel sounds are normal. There is no distension.      Palpations: Abdomen is soft.      Tenderness: There is no abdominal tenderness. There is no right CVA tenderness or left CVA tenderness.   Neurological:      General: No focal deficit present.      Mental Status: She is alert and oriented to person, place, and time.   Psychiatric:         Mood and Affect: Mood normal.         Behavior: Behavior normal.       All questions answered. Patient stated no further questions or concerns at this time.     Electronically signed by MIO West NP on 12/26/2024 at 1:16 PM.

## 2024-12-26 NOTE — ASSESSMENT & PLAN NOTE
Acute  Patient treated with Macrobid at [facility] urgent care for possible UTI.  Urinalysis showed large amounts of blood.  Urine culture was negative.  Symptoms have resolved.  POCT urinalysis is unremarkable today.  Patient is scheduling with a urologist.

## 2024-12-26 NOTE — ASSESSMENT & PLAN NOTE
Acute. Lower abdominal pain started while taking Macrobid.  Symptom has improved significantly since stopping Macrobid.

## 2025-01-02 DIAGNOSIS — N39.0 RECURRENT UTI: ICD-10-CM

## 2025-01-02 LAB
BACTERIA URNS QL MICRO: ABNORMAL /HPF
BILIRUB UR QL STRIP.AUTO: NEGATIVE
CHARACTER UR: ABNORMAL
CLARITY UR: CLEAR
COLOR UR: YELLOW
EPI CELLS #/AREA URNS HPF: ABNORMAL /HPF (ref 0–5)
GLUCOSE UR STRIP.AUTO-MCNC: NEGATIVE MG/DL
HGB UR QL STRIP.AUTO: NEGATIVE
KETONES UR STRIP.AUTO-MCNC: NEGATIVE MG/DL
LEUKOCYTE ESTERASE UR QL STRIP.AUTO: ABNORMAL
MUCOUS THREADS #/AREA URNS LPF: ABNORMAL /LPF
NITRITE UR QL STRIP.AUTO: POSITIVE
PH UR STRIP.AUTO: 6 [PH] (ref 5–8)
PROT UR STRIP.AUTO-MCNC: ABNORMAL MG/DL
RBC #/AREA URNS HPF: ABNORMAL /HPF (ref 0–4)
RENAL EPI CELLS #/AREA UR COMP ASSIST: ABNORMAL /HPF (ref 0–1)
SP GR UR STRIP.AUTO: 1.02 (ref 1–1.03)
UA COMPLETE W REFLEX CULTURE PNL UR: YES
UA DIPSTICK W REFLEX MICRO PNL UR: YES
URN SPEC COLLECT METH UR: ABNORMAL
UROBILINOGEN UR STRIP-ACNC: 0.2 E.U./DL
WBC #/AREA URNS HPF: ABNORMAL /HPF (ref 0–5)

## 2025-01-03 ENCOUNTER — PATIENT MESSAGE (OUTPATIENT)
Dept: INTERNAL MEDICINE CLINIC | Age: 60
End: 2025-01-03

## 2025-01-04 LAB
BACTERIA UR CULT: ABNORMAL
ORGANISM: ABNORMAL

## 2025-01-04 RX ORDER — CEPHALEXIN 500 MG/1
500 CAPSULE ORAL 3 TIMES DAILY
Qty: 15 CAPSULE | Refills: 0 | Status: SHIPPED | OUTPATIENT
Start: 2025-01-04 | End: 2025-01-09

## 2025-01-04 NOTE — RESULT ENCOUNTER NOTE
I sent in an antibiotic called cephalexin.  It is similar to the cefazolin and the bacteria should clear with this.  Keep your appt with urology.

## 2025-01-20 DIAGNOSIS — N39.0 RECURRENT UTI: ICD-10-CM

## 2025-01-20 LAB
BACTERIA URNS QL MICRO: ABNORMAL /HPF
BILIRUB UR QL STRIP.AUTO: NEGATIVE
CLARITY UR: ABNORMAL
COLOR UR: YELLOW
EPI CELLS #/AREA URNS AUTO: 0 /HPF (ref 0–5)
GLUCOSE UR STRIP.AUTO-MCNC: NEGATIVE MG/DL
HGB UR QL STRIP.AUTO: ABNORMAL
HYALINE CASTS #/AREA URNS AUTO: 0 /LPF (ref 0–8)
KETONES UR STRIP.AUTO-MCNC: NEGATIVE MG/DL
LEUKOCYTE ESTERASE UR QL STRIP.AUTO: ABNORMAL
NITRITE UR QL STRIP.AUTO: NEGATIVE
PH UR STRIP.AUTO: 6.5 [PH] (ref 5–8)
PROT UR STRIP.AUTO-MCNC: NEGATIVE MG/DL
RBC CLUMPS #/AREA URNS AUTO: 1 /HPF (ref 0–4)
SP GR UR STRIP.AUTO: 1.01 (ref 1–1.03)
UA COMPLETE W REFLEX CULTURE PNL UR: YES
UA DIPSTICK W REFLEX MICRO PNL UR: YES
URN SPEC COLLECT METH UR: ABNORMAL
UROBILINOGEN UR STRIP-ACNC: 0.2 E.U./DL
WBC #/AREA URNS AUTO: 470 /HPF (ref 0–5)

## 2025-01-22 LAB
BACTERIA UR CULT: ABNORMAL
ORGANISM: ABNORMAL

## 2025-01-22 RX ORDER — CEFUROXIME AXETIL 250 MG/1
250 TABLET ORAL 2 TIMES DAILY
Qty: 10 TABLET | Refills: 0 | Status: SHIPPED | OUTPATIENT
Start: 2025-01-22 | End: 2025-01-27

## 2025-01-22 RX ORDER — NITROFURANTOIN 25; 75 MG/1; MG/1
100 CAPSULE ORAL 2 TIMES DAILY
Qty: 14 CAPSULE | Refills: 0 | Status: SHIPPED | OUTPATIENT
Start: 2025-01-22 | End: 2025-01-22 | Stop reason: ALTCHOICE

## 2025-01-22 NOTE — RESULT ENCOUNTER NOTE
I assume you hare having bladder infection symptoms.   I will send in an antibiotic.  You have some resistent bacteria so crucial to drink plenty of water to flush your bladder.  Avoid carbonated beverages, candie soda pop.  I initially sent nitrofurantoin but I see that you thought it may have caused upset stomach so I cancelled this and sent in cefuroxime.

## 2025-01-31 DIAGNOSIS — R73.02 IMPAIRED GLUCOSE TOLERANCE: ICD-10-CM

## 2025-01-31 DIAGNOSIS — E66.01 OBESITY, CLASS III, BMI 40-49.9 (MORBID OBESITY): ICD-10-CM

## 2025-01-31 RX ORDER — METFORMIN HYDROCHLORIDE 500 MG/1
1000 TABLET, EXTENDED RELEASE ORAL
Qty: 180 TABLET | Refills: 1 | Status: SHIPPED | OUTPATIENT
Start: 2025-01-31

## 2025-02-03 RX ORDER — FLUTICASONE PROPIONATE 50 MCG
SPRAY, SUSPENSION (ML) NASAL
Qty: 16 G | Refills: 12 | Status: SHIPPED | OUTPATIENT
Start: 2025-02-03

## 2025-02-19 ASSESSMENT — PATIENT HEALTH QUESTIONNAIRE - PHQ9
2. FEELING DOWN, DEPRESSED OR HOPELESS: NOT AT ALL
SUM OF ALL RESPONSES TO PHQ9 QUESTIONS 1 & 2: 0
SUM OF ALL RESPONSES TO PHQ QUESTIONS 1-9: 0
2. FEELING DOWN, DEPRESSED OR HOPELESS: NOT AT ALL
SUM OF ALL RESPONSES TO PHQ QUESTIONS 1-9: 0
1. LITTLE INTEREST OR PLEASURE IN DOING THINGS: NOT AT ALL
SUM OF ALL RESPONSES TO PHQ9 QUESTIONS 1 & 2: 0
1. LITTLE INTEREST OR PLEASURE IN DOING THINGS: NOT AT ALL
SUM OF ALL RESPONSES TO PHQ QUESTIONS 1-9: 0
SUM OF ALL RESPONSES TO PHQ QUESTIONS 1-9: 0

## 2025-02-20 ENCOUNTER — OFFICE VISIT (OUTPATIENT)
Dept: INTERNAL MEDICINE CLINIC | Age: 60
End: 2025-02-20
Payer: COMMERCIAL

## 2025-02-20 VITALS
BODY MASS INDEX: 40.82 KG/M2 | HEIGHT: 62 IN | DIASTOLIC BLOOD PRESSURE: 88 MMHG | OXYGEN SATURATION: 99 % | HEART RATE: 81 BPM | SYSTOLIC BLOOD PRESSURE: 138 MMHG | WEIGHT: 221.8 LBS

## 2025-02-20 DIAGNOSIS — Z00.00 ANNUAL PHYSICAL EXAM: ICD-10-CM

## 2025-02-20 DIAGNOSIS — Z00.00 ANNUAL PHYSICAL EXAM: Primary | ICD-10-CM

## 2025-02-20 DIAGNOSIS — R74.01 TRANSAMINITIS: ICD-10-CM

## 2025-02-20 DIAGNOSIS — D33.2 BENIGN NEOPLASM OF BRAIN, UNSPECIFIED BRAIN REGION (HCC): ICD-10-CM

## 2025-02-20 DIAGNOSIS — N39.0 RECURRENT UTI: ICD-10-CM

## 2025-02-20 DIAGNOSIS — Z12.11 SCREEN FOR COLON CANCER: ICD-10-CM

## 2025-02-20 DIAGNOSIS — E66.01 OBESITY, MORBID: ICD-10-CM

## 2025-02-20 DIAGNOSIS — R73.03 PRE-DIABETES: ICD-10-CM

## 2025-02-20 DIAGNOSIS — Z23 NEED FOR PNEUMOCOCCAL VACCINATION: ICD-10-CM

## 2025-02-20 LAB
ALBUMIN SERPL-MCNC: 4.4 G/DL (ref 3.4–5)
ALBUMIN/GLOB SERPL: 1.6 {RATIO} (ref 1.1–2.2)
ALP SERPL-CCNC: 88 U/L (ref 40–129)
ALT SERPL-CCNC: 67 U/L (ref 10–40)
ANION GAP SERPL CALCULATED.3IONS-SCNC: 10 MMOL/L (ref 3–16)
AST SERPL-CCNC: 51 U/L (ref 15–37)
BASOPHILS # BLD: 0.1 K/UL (ref 0–0.2)
BASOPHILS NFR BLD: 1.5 %
BILIRUB SERPL-MCNC: 0.3 MG/DL (ref 0–1)
BUN SERPL-MCNC: 8 MG/DL (ref 7–20)
CALCIUM SERPL-MCNC: 9.8 MG/DL (ref 8.3–10.6)
CHLORIDE SERPL-SCNC: 102 MMOL/L (ref 99–110)
CHOLEST SERPL-MCNC: 228 MG/DL (ref 0–199)
CO2 SERPL-SCNC: 30 MMOL/L (ref 21–32)
CREAT SERPL-MCNC: 0.8 MG/DL (ref 0.6–1.1)
DEPRECATED RDW RBC AUTO: 14.3 % (ref 12.4–15.4)
EOSINOPHIL # BLD: 0.4 K/UL (ref 0–0.6)
EOSINOPHIL NFR BLD: 7.1 %
EST. AVERAGE GLUCOSE BLD GHB EST-MCNC: 131.2 MG/DL
GFR SERPLBLD CREATININE-BSD FMLA CKD-EPI: 85 ML/MIN/{1.73_M2}
GLUCOSE SERPL-MCNC: 128 MG/DL (ref 70–99)
HBA1C MFR BLD: 6.2 %
HCT VFR BLD AUTO: 43.1 % (ref 36–48)
HDLC SERPL-MCNC: 50 MG/DL (ref 40–60)
HGB BLD-MCNC: 14.3 G/DL (ref 12–16)
LDL CHOLESTEROL: 137 MG/DL
LYMPHOCYTES # BLD: 1.7 K/UL (ref 1–5.1)
LYMPHOCYTES NFR BLD: 29.3 %
MCH RBC QN AUTO: 31.3 PG (ref 26–34)
MCHC RBC AUTO-ENTMCNC: 33.2 G/DL (ref 31–36)
MCV RBC AUTO: 94.3 FL (ref 80–100)
MONOCYTES # BLD: 0.5 K/UL (ref 0–1.3)
MONOCYTES NFR BLD: 8.5 %
NEUTROPHILS # BLD: 3.2 K/UL (ref 1.7–7.7)
NEUTROPHILS NFR BLD: 53.6 %
PLATELET # BLD AUTO: 280 K/UL (ref 135–450)
PMV BLD AUTO: 10.2 FL (ref 5–10.5)
POTASSIUM SERPL-SCNC: 4.4 MMOL/L (ref 3.5–5.1)
PROT SERPL-MCNC: 7.2 G/DL (ref 6.4–8.2)
RBC # BLD AUTO: 4.57 M/UL (ref 4–5.2)
SODIUM SERPL-SCNC: 142 MMOL/L (ref 136–145)
TRIGL SERPL-MCNC: 203 MG/DL (ref 0–150)
TSH SERPL DL<=0.005 MIU/L-ACNC: 0.38 UIU/ML (ref 0.27–4.2)
VLDLC SERPL CALC-MCNC: 41 MG/DL
WBC # BLD AUTO: 5.9 K/UL (ref 4–11)

## 2025-02-20 PROCEDURE — 90677 PCV20 VACCINE IM: CPT | Performed by: FAMILY MEDICINE

## 2025-02-20 PROCEDURE — 90471 IMMUNIZATION ADMIN: CPT | Performed by: FAMILY MEDICINE

## 2025-02-20 PROCEDURE — 99396 PREV VISIT EST AGE 40-64: CPT | Performed by: FAMILY MEDICINE

## 2025-02-20 RX ORDER — ESTRADIOL 0.1 MG/G
1 CREAM VAGINAL DAILY
COMMUNITY
Start: 2025-02-06 | End: 2025-02-20 | Stop reason: DRUGHIGH

## 2025-02-20 RX ORDER — ESTRADIOL 0.1 MG/G
1 CREAM VAGINAL
Status: SHIPPED | COMMUNITY
Start: 2025-02-20

## 2025-02-20 SDOH — ECONOMIC STABILITY: FOOD INSECURITY: WITHIN THE PAST 12 MONTHS, THE FOOD YOU BOUGHT JUST DIDN'T LAST AND YOU DIDN'T HAVE MONEY TO GET MORE.: NEVER TRUE

## 2025-02-20 SDOH — HEALTH STABILITY: PHYSICAL HEALTH: ON AVERAGE, HOW MANY DAYS PER WEEK DO YOU ENGAGE IN MODERATE TO STRENUOUS EXERCISE (LIKE A BRISK WALK)?: 3 DAYS

## 2025-02-20 SDOH — ECONOMIC STABILITY: FOOD INSECURITY: WITHIN THE PAST 12 MONTHS, YOU WORRIED THAT YOUR FOOD WOULD RUN OUT BEFORE YOU GOT MONEY TO BUY MORE.: NEVER TRUE

## 2025-02-20 SDOH — HEALTH STABILITY: PHYSICAL HEALTH: ON AVERAGE, HOW MANY MINUTES DO YOU ENGAGE IN EXERCISE AT THIS LEVEL?: 30 MIN

## 2025-02-20 NOTE — PROGRESS NOTES
Chief Complaint   Patient presents with    Annual Exam     FASTING   Here with her  Ghulam.    PREVENTATIVE VISIT AND EXAM      Assessment:      1. Annual physical exam  Discussed preventative issues including vaccines.    - CBC with Auto Differential; Future  - Comprehensive Metabolic Panel; Future  - Lipid, Fasting; Future  - TSH reflex to FT4; Future  - Hemoglobin A1C; Future    2. Recurrent UTI  She is now using d-mannose and vaginal estrogen which hopefully will both help.  Discussed with her using bidet attachment for cleaning the perineum also.    3. Benign neoplasm of brain, unspecified brain region (HCC)  Has been monitored by [place] [facility] for at least 15 years or more and has been stable.  Does not need routine follow-up.  No symptoms.    4. Screen for colon cancer  - Fecal DNA Colorectal cancer screening (Cologuard)    5. Need for pneumococcal vaccination  - Pneumococcal, PCV20, PREVNAR 20, (age 6w+), IM, PF    6. Transaminitis  Weight loss encouraged.- Comprehensive Metabolic Panel; Future    7. Pre-diabetes  Weight loss encouraged.  - Hemoglobin A1C; Future    8.  Morbid obesity  - BMI is 40.6.  We discussed weight loss options.  She is starting to work on improved lifestyle changes.  Will see if she can be successful with weight loss with lifestyle improvements only.  If not she can return to discuss other medication options.  Probably would not be able to afford compounded semaglutide.      Health Maintenance Due   Topic Date Due    Pneumococcal 50+ years Vaccine (2 of 2 - PCV) 01/23/2021    Colorectal Cancer Screen  01/25/2025    A1C test (Diabetic or Prediabetic)  02/01/2025         Plan:    See orders and patient instructions.  Age-appropriate preventative issues discussed and hand out given.    Follow up:  Return in 1 year (on 2/20/2026) for CPE (Physical Exam).  Return sooner if she needs assistance with weight loss.      SUBJECTIVE:  Overall doing fine.    She is dealing with her

## 2025-02-20 NOTE — PATIENT INSTRUCTIONS
Bidet toilet attachment (Tubrendany was recommended)       Starting a Weight-Loss Plan: Care Instructions  Overview    It can be a challenge to lose weight. But your doctor can help you make a weight-loss plan that meets your needs.  You don't have to make a lot of big changes at once. A better idea might be to focus on small changes and stick with them. When those changes become habit, you can add a few more changes.  Some people find it helpful to take an exercise or nutrition class. If you have questions, ask your doctor about seeing a registered dietitian or an exercise specialist. You might also think about joining a weight-loss support group.  If you're not ready to make changes right now, try to pick a date in the future. Then make an appointment with your doctor to talk about when and how you'll get started with a plan.  Follow-up care is a key part of your treatment and safety. Be sure to make and go to all appointments, and call your doctor if you are having problems. It's also a good idea to know your test results and keep a list of the medicines you take.  How can you care for yourself as you start a weight-loss plan?  Set realistic goals. Many people expect to lose much more weight than is likely. A weight loss of 5% to 10% of your body weight may be enough to improve your health.  Get family and friends involved to provide support. Talk to them about why you are trying to lose weight, and ask them to help. They can help by participating in exercise and having meals with you, even if they may be eating something different.  Find what works best for you. If you do not have time or do not like to cook, a program that offers meal replacement bars or shakes may be better for you. Or if you like to prepare meals, finding a plan that includes daily menus and recipes may be best.  Ask your doctor about other health professionals who can help you achieve your weight-loss goals.  A dietitian can help you make healthy

## 2025-02-21 NOTE — RESULT ENCOUNTER NOTE
Sugar is high and may be diabetic since you fasted.  Cholesterol is also not to goal.  Liver showing signs of inflammation from too much fatty tissue inside the abdomen. Other labs are normal.    Work on losing weight and see me in 3 months.  If you feel you need medication due to high appetite or constantly thinking about food, you can come in sooner than 3 months.

## 2025-03-05 DIAGNOSIS — E03.9 ACQUIRED HYPOTHYROIDISM: ICD-10-CM

## 2025-03-05 RX ORDER — LEVOTHYROXINE SODIUM 112 UG/1
112 TABLET ORAL DAILY
Qty: 90 TABLET | Refills: 1 | Status: SHIPPED | OUTPATIENT
Start: 2025-03-05

## 2025-03-05 NOTE — TELEPHONE ENCOUNTER
Medication:   Requested Prescriptions     Pending Prescriptions Disp Refills    levothyroxine (SYNTHROID) 112 MCG tablet [Pharmacy Med Name: LEVOTHYROXINE 0.112MG (112MCG) TABS] 90 tablet 2     Sig: TAKE 1 TABLET BY MOUTH DAILY        Last Filled:      Patient Phone Number: [phone] (home)     Last appt: 2/20/2025   Next appt: 6/5/2025    Last OARRS:        No data to display

## 2025-03-08 ENCOUNTER — RESULTS FOLLOW-UP (OUTPATIENT)
Dept: INTERNAL MEDICINE CLINIC | Age: 60
End: 2025-03-08

## 2025-03-08 LAB — NONINV COLON CA DNA+OCC BLD SCRN STL QL: NEGATIVE

## 2025-03-27 ENCOUNTER — HOSPITAL ENCOUNTER (OUTPATIENT)
Dept: WOMENS IMAGING | Age: 60
Discharge: HOME | End: 2025-03-27
Payer: COMMERCIAL

## 2025-03-27 VITALS — BODY MASS INDEX: 39.56 KG/M2 | HEIGHT: 62 IN | WEIGHT: 215 LBS

## 2025-03-27 DIAGNOSIS — Z12.31 VISIT FOR SCREENING MAMMOGRAM: ICD-10-CM

## 2025-03-27 PROCEDURE — 77063 BREAST TOMOSYNTHESIS BI: CPT

## 2025-03-30 DIAGNOSIS — K21.9 GASTROESOPHAGEAL REFLUX DISEASE, UNSPECIFIED WHETHER ESOPHAGITIS PRESENT: ICD-10-CM

## 2025-03-30 RX ORDER — ESOMEPRAZOLE MAGNESIUM 40 MG/1
CAPSULE, DELAYED RELEASE ORAL
Qty: 30 CAPSULE | Refills: 5 | Status: SHIPPED | OUTPATIENT
Start: 2025-03-30

## 2025-06-05 ENCOUNTER — OFFICE VISIT (OUTPATIENT)
Dept: INTERNAL MEDICINE CLINIC | Age: 60
End: 2025-06-05

## 2025-06-05 VITALS
WEIGHT: 212.6 LBS | HEIGHT: 62 IN | BODY MASS INDEX: 39.12 KG/M2 | HEART RATE: 68 BPM | OXYGEN SATURATION: 99 % | SYSTOLIC BLOOD PRESSURE: 120 MMHG | DIASTOLIC BLOOD PRESSURE: 80 MMHG

## 2025-06-05 DIAGNOSIS — R73.02 IMPAIRED GLUCOSE TOLERANCE: ICD-10-CM

## 2025-06-05 DIAGNOSIS — E78.2 MIXED HYPERLIPIDEMIA: ICD-10-CM

## 2025-06-05 DIAGNOSIS — E66.01 CLASS 2 SEVERE OBESITY DUE TO EXCESS CALORIES WITH SERIOUS COMORBIDITY AND BODY MASS INDEX (BMI) OF 38.0 TO 38.9 IN ADULT (HCC): ICD-10-CM

## 2025-06-05 DIAGNOSIS — E66.812 CLASS 2 SEVERE OBESITY DUE TO EXCESS CALORIES WITH SERIOUS COMORBIDITY AND BODY MASS INDEX (BMI) OF 38.0 TO 38.9 IN ADULT (HCC): ICD-10-CM

## 2025-06-05 DIAGNOSIS — E66.812 CLASS 2 SEVERE OBESITY DUE TO EXCESS CALORIES WITH SERIOUS COMORBIDITY AND BODY MASS INDEX (BMI) OF 38.0 TO 38.9 IN ADULT (HCC): Primary | ICD-10-CM

## 2025-06-05 DIAGNOSIS — R23.2 HOT FLASHES: ICD-10-CM

## 2025-06-05 DIAGNOSIS — E66.01 CLASS 2 SEVERE OBESITY DUE TO EXCESS CALORIES WITH SERIOUS COMORBIDITY AND BODY MASS INDEX (BMI) OF 38.0 TO 38.9 IN ADULT (HCC): Primary | ICD-10-CM

## 2025-06-05 LAB
ANION GAP SERPL CALCULATED.3IONS-SCNC: 11 MMOL/L (ref 3–16)
BUN SERPL-MCNC: 8 MG/DL (ref 7–20)
CALCIUM SERPL-MCNC: 10 MG/DL (ref 8.3–10.6)
CHLORIDE SERPL-SCNC: 101 MMOL/L (ref 99–110)
CHOLEST SERPL-MCNC: 202 MG/DL (ref 0–199)
CO2 SERPL-SCNC: 29 MMOL/L (ref 21–32)
CREAT SERPL-MCNC: 0.8 MG/DL (ref 0.6–1.1)
GFR SERPLBLD CREATININE-BSD FMLA CKD-EPI: 85 ML/MIN/{1.73_M2}
GLUCOSE SERPL-MCNC: 86 MG/DL (ref 70–99)
HDLC SERPL-MCNC: 51 MG/DL (ref 40–60)
LDLC SERPL CALC-MCNC: 115 MG/DL
POTASSIUM SERPL-SCNC: 4.3 MMOL/L (ref 3.5–5.1)
SODIUM SERPL-SCNC: 141 MMOL/L (ref 136–145)
TRIGL SERPL-MCNC: 180 MG/DL (ref 0–150)
VLDLC SERPL CALC-MCNC: 36 MG/DL

## 2025-06-05 RX ORDER — ESTRADIOL 0.5 MG/1
0.5 TABLET ORAL DAILY
Qty: 90 TABLET | Refills: 2 | Status: SHIPPED | OUTPATIENT
Start: 2025-06-05

## 2025-06-05 NOTE — PATIENT INSTRUCTIONS
Habit stacking is a good thing.  Once you feel your diet is really stable and under control,  try to think about adding some sort of exercise.    Smaller plates, bowls, cups.  Can have small second portion if a full 20 minutes has passed.      Xxxxxxxxxxxxxxxxxxxxxxxxxxxxx    Medication options:    Buproprion and Naltrexone (similar to Contrave)  Zonisamide  Topiramate  Or a combination of 2 of these.    Adipex is an option but requires 30 day visits for the first 3 prescriptions and then 90 day visits for the rest of 1 year and stop in a year with a minimum of 5% weight loss in 3 months.

## 2025-06-05 NOTE — PROGRESS NOTES
2025    Yuly Barlow (:  1965) is a 59 y.o. female, here for evaluation of the following chief complaint(s):  3 Month Follow-Up    Not fully fasting today.  Ate around 6 AM.    ASSESSMENT/PLAN:    1. Class 2 severe obesity due to excess calories with serious comorbidity and body mass index (BMI) of 38.0 to 38.9 in adult (HCC)  Further diet counseling.  Told medication options that are less expensive the GLPs.   She will research other meds but for now is inclined to keep up with improved lifestyle changes.    - Hemoglobin A1C; Future  - Basic Metabolic Panel; Future    Wt Readings from Last 3 Encounters:   25 96.4 kg (212 lb 9.6 oz)   25 97.5 kg (215 lb)   25 100.6 kg (221 lb 12.8 oz)         2. Hot flashes  Menopausal.   - estradiol (ESTRACE) 0.5 MG tablet; Take 1 tablet by mouth daily  Dispense: 90 tablet; Refill: 2    3. Impaired glucose tolerance  On metformin.    Not diabetic.    - Hemoglobin A1C; Future  - Basic Metabolic Panel; Future    4. Mixed hyperlipidemia  LDL is above goal but ASCVD risk is still acceptable unless develops DM   - Lipoprotein A (LPA); Future  --- due to FH of CAD and borderline readings.   - Lipid Panel; Future            FOLLOW UP:  Follow up in 2026 but sooner if desires other weight loss medication.     HPI  Here for obesity.  She is on the fence with taking more medication for her weight.  She is taking metformin for this and insulin resistance.       Her glucose reading out of the shower fasting was 126.    Took her Metformin and around 10 AM,  sugar was 84.  This was a few hours after eating.    She is really trying to work on her diet and eat more fruit, veggies and whole grains.  Cutting back on sweets.     She has been exercising at [employer] for a while and she and  go regularly.    See assessment above for other issues addressed at this visit.    Outpatient Medications Marked as Taking for the 25 encounter (Office

## 2025-06-06 ENCOUNTER — RESULTS FOLLOW-UP (OUTPATIENT)
Dept: INTERNAL MEDICINE CLINIC | Age: 60
End: 2025-06-06

## 2025-06-06 LAB
EST. AVERAGE GLUCOSE BLD GHB EST-MCNC: 125.5 MG/DL
HBA1C MFR BLD: 6 %

## 2025-06-07 LAB — LPA SERPL-MCNC: 6 MG/DL

## 2025-07-30 DIAGNOSIS — R73.02 IMPAIRED GLUCOSE TOLERANCE: ICD-10-CM

## 2025-07-30 DIAGNOSIS — E66.813 OBESITY, CLASS III, BMI 40-49.9 (MORBID OBESITY) (HCC): ICD-10-CM

## 2025-07-30 RX ORDER — METFORMIN HYDROCHLORIDE 500 MG/1
1000 TABLET, EXTENDED RELEASE ORAL
Qty: 180 TABLET | Refills: 1 | Status: SHIPPED | OUTPATIENT
Start: 2025-07-30

## 2025-07-30 NOTE — TELEPHONE ENCOUNTER
Medication:   Requested Prescriptions     Pending Prescriptions Disp Refills    metFORMIN (GLUCOPHAGE-XR) 500 MG extended release tablet [Pharmacy Med Name: METFORMIN ER 500MG 24HR TABS] 180 tablet 1     Sig: TAKE 2 TABLETS BY MOUTH DAILY WITH BREAKFAST        Last Filled:      Patient Phone Number: [phone] (home)     Last appt: 6/5/2025   Next appt: Visit date not found    Last OARRS:        No data to display

## 2025-08-11 ENCOUNTER — PATIENT MESSAGE (OUTPATIENT)
Dept: INTERNAL MEDICINE CLINIC | Age: 60
End: 2025-08-11

## 2025-08-11 DIAGNOSIS — K21.9 GASTROESOPHAGEAL REFLUX DISEASE, UNSPECIFIED WHETHER ESOPHAGITIS PRESENT: ICD-10-CM

## 2025-08-11 RX ORDER — ESOMEPRAZOLE MAGNESIUM 40 MG/1
CAPSULE, DELAYED RELEASE ORAL
Qty: 90 CAPSULE | Refills: 2 | Status: SHIPPED | OUTPATIENT
Start: 2025-08-11

## 2025-09-01 DIAGNOSIS — E03.9 ACQUIRED HYPOTHYROIDISM: ICD-10-CM

## 2025-09-01 RX ORDER — LEVOTHYROXINE SODIUM 112 UG/1
112 TABLET ORAL DAILY
Qty: 90 TABLET | Refills: 1 | Status: SHIPPED | OUTPATIENT
Start: 2025-09-01

## (undated) DEVICE — PADDING,UNDERCAST,COTTON, 4"X4YD STERILE: Brand: MEDLINE

## (undated) DEVICE — SUTURE VCRL + SZ 3-0 L27IN ABSRB UD L26MM SH 1/2 CIR VCP416H

## (undated) DEVICE — GLOVE SURG SZ 65 THK91MIL LTX FREE SYN POLYISOPRENE

## (undated) DEVICE — SOLUTION IRRIG 500ML 0.9% SOD CHLO USP POUR PLAS BTL

## (undated) DEVICE — BANDAGE COMPR W4INXL12FT E DISP ESMARCH EVEN

## (undated) DEVICE — SUTURE VICRYL + SZ 3-0 L27IN ABSRB UD L26MM SH 1/2 CIR VCP416H

## (undated) DEVICE — WRAP COHESIVE W2INXL5YD TAN SELF ADH BNDG HND NON STERILE TEAR CARING

## (undated) DEVICE — PADDING CAST W4INXL4YD ST COT RAYON MICROPLEATED HIGHLY

## (undated) DEVICE — BANDAGE COMPR W4INXL5YD BGE HI E W/ REM CLP SURE-WRAP

## (undated) DEVICE — INTENDED FOR TISSUE SEPARATION, AND OTHER PROCEDURES THAT REQUIRE A SHARP SURGICAL BLADE TO PUNCTURE OR CUT.: Brand: BARD-PARKER ® STAINLESS STEEL BLADES

## (undated) DEVICE — WILLIS PACK: Brand: MEDLINE INDUSTRIES, INC.

## (undated) DEVICE — SOLUTION IV IRRIG 250ML ST LF 0.9% SODIUM 2F7122

## (undated) DEVICE — RIGID LIGHT HANDLE: Brand: CARDINAL HEALTH

## (undated) DEVICE — CORD,CAUTERY,BIPOLAR,STERILE: Brand: MEDLINE

## (undated) DEVICE — UNDERGLOVE SURG SZ 8 FNGR THK0.21MIL GRN LTX BEAD CUF

## (undated) DEVICE — SUTURE CHROMIC GUT SZ 4-0 L27IN ABSRB BRN FS-2 L19MM 3/8 635H

## (undated) DEVICE — GLOVE SURG SZ 65 L12IN FNGR THK79MIL GRN LTX FREE

## (undated) DEVICE — TOWEL,OR,DSP,ST,BLUE,STD,4/PK,20PK/CS: Brand: MEDLINE

## (undated) DEVICE — GLOVE SURG SZ 65 CRM LTX FREE POLYISOPRENE POLYMER BEAD ANTI

## (undated) DEVICE — SHEET,DRAPE,53X77,STERILE: Brand: MEDLINE

## (undated) DEVICE — BANDAGE COMPR W4INXL15FT BGE E SGL LAYERED CLP CLSR

## (undated) DEVICE — SYRINGE, LUER LOCK, 10ML: Brand: MEDLINE

## (undated) DEVICE — ZIMMER® STERILE DISPOSABLE TOURNIQUET CUFF WITH PLC, DUAL PORT, SINGLE BLADDER, 18 IN. (46 CM)

## (undated) DEVICE — STERILE POLYISOPRENE POWDER-FREE SURGICAL GLOVES: Brand: PROTEXIS